# Patient Record
Sex: FEMALE | Race: WHITE | NOT HISPANIC OR LATINO | Employment: STUDENT | URBAN - METROPOLITAN AREA
[De-identification: names, ages, dates, MRNs, and addresses within clinical notes are randomized per-mention and may not be internally consistent; named-entity substitution may affect disease eponyms.]

---

## 2017-01-27 ENCOUNTER — GENERIC CONVERSION - ENCOUNTER (OUTPATIENT)
Dept: OTHER | Facility: OTHER | Age: 22
End: 2017-01-27

## 2017-05-18 ENCOUNTER — ALLSCRIPTS OFFICE VISIT (OUTPATIENT)
Dept: OTHER | Facility: OTHER | Age: 22
End: 2017-05-18

## 2017-12-12 NOTE — PROGRESS NOTES
Assessment    1  Chronic migraine without aura without status migrainosus, not intractable (346 70)   (G43 709)   2  Migraine, unspecified, not intractable, without status migrainosus (346 90) (G43 909)    Plan  Chronic migraine without aura without status migrainosus, not intractable    · Dexamethasone 1 MG Oral Tablet; take 1 tab in am x 5 days with food and water   Rx By: Ricci Castellon; Dispense: 0 Days ; #:5 Tablet; Refill: 0; For: Chronic migraine without aura without status migrainosus, not intractable; RAYMUNDO = N; Verified Transmission to [facility] [address]; Last Updated By: System, [employer]; 5/18/2017 10:21:30 AM  Chronic migraine without aura without status migrainosus, not intractable, Migraine,  unspecified, not intractable, without status migrainosus    · SUMAtriptan Succinate 100 MG Oral Tablet; TAKE 1 TABLET AT ONSET OF  MIGRAINE HEADACHE  MAY REPEAT IN 2 HOURS IF NEEDED, no more than 200mg in  24 and no more than 3 tabs in week   Rx By: Ricci Castellon; Dispense: 0 Days ; #:10 Tablet; Refill: 0; For: Chronic migraine without aura without status migrainosus, not intractable, Migraine, unspecified, not intractable, without status migrainosus; RAYMUNDO = N; Verified Transmission to [facility] [address]; Last Updated By: System, SureScripts; 5/18/2017 10:21:28 AM   · Follow-up visit in 6 months Evaluation and Treatment  Follow-up with Dr Prateek Benoit preferred  Status: Complete  Done: [id number]   Ordered; For: Chronic migraine without aura without status migrainosus, not intractable, Migraine, unspecified, not intractable, without status migrainosus; Ordered By: Ricci Castellon Performed:  Due: [id number]; Last Updated By: Elise Haley; 5/18/2017 10:22:51 AM    Discussion/Summary  Discussion Summary:   Vineet Beltran is doing well even after stopping her prevention meds, but still cannot find a clear trigger or pattern for migraines  I sggested to keep a journal in writing or as an perry on the phone   She will avoid alcohol if possible since this is a possible trigger  She will try supplementation as noted below for prevention, but if HAs worsen in severity or frequency she will call for prevention med such as TCA  Migraine prevention:  Magnesium- 250- 500 mg daily  Vit B2/ riboflavin- 100 mg daily    Abortive tx:  Sumatriptan +/- Excedrin migraine or Decadron  - use Decadron for longer, more intractable migraines    The patient will follow up in 6-12 months with Dr Giancarlo Martins preferred, or earlier if needed  The patient was encouraged to call in the meantime with any questions or concerns  The patient was told to call 911 or report to the nearest ER with any new or worsening neurological symptoms  She expressed understanding of the plan and was appreciative  Medication SE Review and Pt Understands Tx: Possible side effects of new medications were reviewed with the patient/guardian today  The treatment plan was reviewed with the patient/guardian  The patient/guardian understands and agrees with the treatment plan   Patient Guardian understands agrees: The treatment plan was reviewed with the patient/guardian  The patient/guardian understands and agrees with the treatment plan   Headache [facility]:   The patient was counseled regarding;   Discussed side effects of all medications prescribed today to the patient in detail  See above  Patient education was completed today and we also discussed precautions for rebound headaches  When patient has a moderate to severe headache, they should seek rest, initiate relaxation and apply cold compresses to the head  Also recommended to the patient :  1  Maintain regular sleep schedule  2  Limit over the counter medications  (No more than 3 times a week)  3  Maintain headache diary  4  Limit caffeine to 1-2 cups a day or less  5  Avoid dietary trigger  (list given to the patient and reviewed with them)  6  Patient is to have regular frequent meals to prevent headache onset        Chief Complaint  Chief Complaint Free Text Note Form: Pt presents for follow up for headaches  History of Present Illness  HPI: We had the pleasure of evaluating Cecelia Wray in neurological follow up today  As you know she is a 24year old right handed female  She goes to PT school at [employer]  Since last seen headaches have been stable  She stopped her prevention meds and HAs did not worsen  She still had difficulty defining clear trigger or pattern; sometimes HAs are several days per week, sometimes 0-1 per month  Not related to menstrual cycle, food or stress per pt  Possibly one trigger is alcohol, as she usually gets a migraine 2-3 days after consuming alcohol  What is your current pain level - 0/10  Headaches started at what age - Oct of 2013  She was having nose bleeds and had cauterization done and that trigger headaches    Accident or injury prior to onset of headaches - none  How often do the headaches occur - (noted above) sometimes 3-4 per week and some weeks none, not related to menstrual cycle, no food triggers, no stress triggers (ie  had 0-1 minor HA over week of finals)  What time of the day do the headaches start - bedtime  How long do the headaches last - all day  Are you ever headache free - yes  Where are they located - frontal, orbits  What is the intensity of pain -average pain level 6/10, up to 9-10/10  Any warning prior to headache and how long do they last - none  Describe your usual headache - Pressure, Throbbing  Associated symptoms: nausea, decrease appetite, photophobia, blurred vision, phonophobia, sensitive to smells, Problem with concentration, light-headed or dizzy, stiff or sore neck, prefer to be alone and in a dark room, unable to work  Headaches are worse if the patient: cough, sneeze, bending over  Headache trigger: watching TV without glasses, gets a HA when she does not drink coffee in AM (only drinks 1-2 cups daily), alcohol (as above)  Have you had epidural injections or transforaminal injections performed - no  What medications do you take or have you taken for your headaches? PREVENTIVE: mag- caused headaches, B2, amitriptyline, venlafaxine- not effective, zonegran, zoloft (she is weaning from this, was prescribed this for anxiety 2/2 adjustment to PT school)  ABORTIVE: Excedrin, Advil, sumatriptan, IV ketorlac- not effective, dexamethasone- effective, olanzapine- effective  Non-Medical/Alternative Treatments used in the past for headaches: chiropractor    Brain MRI 12/30/14  - normal  - left maxillary sinus retention cyst       Review of Systems  Neurological ROS:   Constitutional: no fever, no chills, no recent weight gain, no recent weight loss, no complaints of feeling tired, no changes in appetite  HEENT:  no sinus problems, not feeling congested, no blurred vision, no dryness of the eyes, no eye pain, no hearing loss, no tinnitus, no mouth sores, no sore throat, no hoarseness, no dysphagia, no masses, no bleeding  Cardiovascular:  no chest pain or pressure, no palpitations present, the heart rate was not rapid or irregular, no swelling in the arms or legs, no poor circulation  Respiratory:  no unusual or persistant cough, no shortness of breath with or without exertion  Gastrointestinal:  no nausea, no vomiting, no diarrhea, no abdominal pain, no changes in bowel habits, no melena, no loss of bowel control  Genitourinary:  no incontinence, no feelings of urinary urgency, no increase in frequency, no urinary hesitancy, no dysuria, no hematuria  Musculoskeletal:  no arthralgias, no myalgias, no immobility or loss of function, no head/neck/back pain, no pain while walking  Integumentary  no masses, no rash, no skin lesions, no livedo reticularis  Psychiatric:  no anxiety, no depression, no mood swings, no psychiatric hospitalizations, no sleep problems  Endocrine   no unusual weight loss or gain, no excessive urination, no excessive thirst, no hair loss or gain, no hot or cold intolerance, no menstrual period change or irregularity, no loss of sexual ability or drive, no erection difficulty, no nipple discharge  Hematologic/Lymphatic:  no unusual bleeding, no tendency for easy bruising, no clotting skin or lumps  Neurological General: headache  Neurological Mental Status:  no confusion, no mood swings, no alteration or loss of consciousness, no difficulty expressing/understanding speech, no memory problems  Neurological Cranial Nerves:  no blurry or double vision, no loss of vision, no face drooping, no facial numbness or weakness, no taste or smell loss/changes, no hearing loss or ringing, no vertigo or dizziness, no dysphagia, no slurred speech  Neurological Motor findings include:  no tremor, no twitching, no cramping(pre/post exercise), no atrophy  Neurological Coordination:  no unsteadiness, no vertigo or dizziness, no clumsiness, no problems reaching for objects  Neurological Sensory:  no numbness, no pain, no tingling, does not fall when eyes closed or taking a shower  Neurological Gait:  no difficulty walking, not falling to one side, no sensation of being pushed, has not had falls  ROS Reviewed:   ROS reviewed  Active Problems    1  Anxiety (300 00) (F41 9)   2  Chronic migraine without aura without status migrainosus, not intractable (346 70)   (G43 709)   3  Migraine, unspecified, not intractable, without status migrainosus (346 90) (G43 909)   4  Need for meningococcus vaccine (V03 89) (Z23)   5  Need for Tdap vaccination (V06 1) (Z23)    Past Medical History    1  History of Abdominal discomfort (789 00) (R10 9)   2  History of Acute otitis media, left (382 9) (H66 92)   3  History of Closed Fracture Of The Tibia (823 80)   4  History of Clostridium difficile colitis (008 45) (A04 7)   5  History of Dysuria (788 1) (R30 0)   6  History of abdominal pain (V13 89) (Z87 898)   7   History of abdominal pain (V13 89) (Q92 613)   8  History of diarrhea (V12 79) (Z87 898)   9  History of diarrhea (V12 79) (Z87 898)   10  History of serous otitis media (V12 49) (Z86 69)   11  History of Impacted cerumen of both ears (380 4) (H61 23)   12  History of Lactose intolerance (271 3) (E73 9)   13  History of Lightheadedness (780 4) (R42)   14  History of Loose stools (787 7) (R19 5)   15  History of Need for prophylactic vaccination or inoculation against diphtheria and tetanus    (V06 5) (Z23)   16  History of Need for prophylactic vaccination with Streptococcus pneumoniae    (Pneumococcus) and Influenza vaccines (V06 6) (Z23)   17  History of Other fatigue (780 79) (R53 83)   18  History of Sore of lower lip (528 5) (K13 0)   19  History of Sore throat (462) (J02 9)  Active Problems And Past Medical History Reviewed: The active problems and past medical history were reviewed and updated today  Surgical History    1  History of Ankle Surgery  Surgical History Reviewed: The surgical history was reviewed and updated today  Family History  Mother    1  Family history of asthma (V17 5) (Z82 5)   2  Family history of MGUS (monoclonal gammopathy of unknown significance)  Father    3  Family history of right bundle branch block (RBBB) (V17 49) (Z82 49)  Sister    4  Family history of Pancreatitis  Maternal Grandfather    5  Family history of Alzheimer's disease (V17 2) (Z82 0)   6  Family history of Lyme disease (V18 8) (Z83 1)  Family History Reviewed: The family history was reviewed and updated today  Social History    · Caffeine use (V49 89) (F15 90)   · Exercises regularly   · Never a smoker   · No drug use   · Single   · Social alcohol use (Z78 9)   · Student  Social History Reviewed: The social history was reviewed and updated today  The social history was reviewed and is unchanged  Current Meds   1  Dexamethasone 1 MG Oral Tablet; take 1 tab in am x 5 days;    Therapy: [id number] to (Last Rx:[id number]) Requested for: 21CVD7760 Ordered   2  Sertraline HCl - 25 MG Oral Tablet; TAKE 1 TABLET qhs;   Therapy: 79UHG6275 to (Last KA:[id number])  Requested for: 58GTC6452 Ordered   3  SUMAtriptan Succinate 100 MG Oral Tablet; TAKE 1 TABLET AT ONSET OF MIGRAINE   HEADACHE  MAY REPEAT IN 2 HOURS IF NEEDED, no more than 200mg in 24   and no more than 3 tabs in week; Therapy: 58Ykp2139 to (Last Rx:30Mar2017)  Requested for: 12MAH6883 Ordered  Medication List Reviewed: The medication list was reviewed and updated today  Allergies    1  No Known Drug Allergies    2  Dairy    Vitals  Signs   Recorded: 04ZCE2273 09:48AM   Heart Rate: 50  Respiration: 16  Systolic: 746  Diastolic: 62  Height: 5 ft 5 in  Weight: 132 lb   BMI Calculated: 21 97  BSA Calculated: 1 66  O2 Saturation: 98    Physical Exam    Constitutional   General appearance: No acute distress, well appearing and well nourished  well developed and well nourished  Musculoskeletal   Gait and station: Normal gait, stance and balance  Gait evaluation demonstrated a normal gait  Muscle strength: Normal strength throughout  Muscle tone: No atrophy, abnormal movements, flaccidity, cogwheeling or spasticity  Neurologic   Language: Names objects, able to repeat phrases and speaks spontaneously  Language: The evaluation was normal    2nd cranial nerve: Normal     3rd, 4th, and 6th cranial nerves: Normal     5th cranial nerve: Normal     7th cranial nerve: Normal     8th cranial nerve: Normal     9th cranial nerve: Normal     11th cranial nerve: Normal     12th cranial nerve: Normal     Sensation: Normal     Reflexes: Normal     Coordination: Normal     Judgment and insight: Normal     Mood and affect: Normal   pleasant  Future Appointments    Date/Time Provider Specialty Site   12/28/2017 09:30 AM Swathi Vivas MD Neurology [facility] 21     Signatures  Electronically signed by :  Yung Milligan [facility]; May 18 2017 10:38AM EST (Author)

## 2017-12-22 ENCOUNTER — ALLSCRIPTS OFFICE VISIT (OUTPATIENT)
Dept: OTHER | Facility: OTHER | Age: 22
End: 2017-12-22

## 2017-12-22 ENCOUNTER — APPOINTMENT (OUTPATIENT)
Dept: LAB | Facility: CLINIC | Age: 22
End: 2017-12-22
Payer: COMMERCIAL

## 2017-12-22 ENCOUNTER — GENERIC CONVERSION - ENCOUNTER (OUTPATIENT)
Dept: OTHER | Facility: OTHER | Age: 22
End: 2017-12-22

## 2017-12-22 DIAGNOSIS — Z01.84 ENCOUNTER FOR ANTIBODY RESPONSE EXAMINATION: ICD-10-CM

## 2017-12-22 LAB — RUBV IGG SERPL IA-ACNC: 39.3 IU/ML

## 2017-12-22 PROCEDURE — 86787 VARICELLA-ZOSTER ANTIBODY: CPT

## 2017-12-22 PROCEDURE — 36415 COLL VENOUS BLD VENIPUNCTURE: CPT

## 2017-12-22 PROCEDURE — 86762 RUBELLA ANTIBODY: CPT

## 2017-12-26 LAB — VZV IGG SER IA-ACNC: NORMAL

## 2017-12-27 ENCOUNTER — ALLSCRIPTS OFFICE VISIT (OUTPATIENT)
Dept: OTHER | Facility: OTHER | Age: 22
End: 2017-12-27

## 2017-12-28 ENCOUNTER — ALLSCRIPTS OFFICE VISIT (OUTPATIENT)
Dept: OTHER | Facility: OTHER | Age: 22
End: 2017-12-28

## 2017-12-29 NOTE — PROGRESS NOTES
Assessment   1  Chronic migraine without aura without status migrainosus, not intractable (346 70)     (G43 709)   2  Common migraine without aura (346 10) (G43 009)    Plan   Chronic migraine without aura without status migrainosus, not intractable    · Dexamethasone 1 MG Oral Tablet; take 1 tab in am x 5 days with food and water   Rx By: Aguila Ledbetter; Dispense: 0 Days ; #:5 Tablet; Refill: 0;For: Chronic migraine without aura without status migrainosus, not intractable; RAYMUNDO = N; Verified Transmission to [address]; Last Updated By: System, SureScripts; 12/28/2017 10:00:03 AM   · SUMAtriptan Succinate 100 MG Oral Tablet; TAKE 1 TABLET AT ONSET OF    MIGRAINE HEADACHE  MAY REPEAT IN 2 HOURS IF NEEDED, no more than 200mg in    24 and no more than 3 tabs in week   Rx By: Aguila Ledbetter; Dispense: 0 Days ; #:10 Tablet; Refill: 3;For: Chronic migraine without aura without status migrainosus, not intractable; RAYMUNDO = N; Verified Transmission to [address]; Last Updated By: System, SureScripts; 12/28/2017 10:00:02 AM  Chronic migraine without aura without status migrainosus, not intractable, Health    Maintenance    · Cyproheptadine HCl - 4 MG Oral Tablet; TAKE 1 TABLET AT BEDTIME   Rx By: Aguila Ledbetter; Dispense: 30 Days ; #:30 Tablet; Refill: 3;For: Chronic migraine without aura without status migrainosus, not intractable, Health Maintenance; RAYMUNDO = N; Verified Transmission to [address]; Last Updated By: System, SureScripts; 12/28/2017 10:00:02 AM   · Follow-up visit in 3 months Evaluation and Treatment  Follow-up  Status: Complete     Done: [id number]   Ordered; For: Chronic migraine without aura without status migrainosus, not intractable, Health Maintenance; Ordered By: Aguila Ledbetter Performed:  Due: 05VPT2827;  Last Updated By: Ryann Anaya; 12/28/2017 10:01:54 AM    Discussion/Summary   Discussion Summary:    Preventive:  she has noted that headache frequency increases during winter and get better during summer  Thus will have her try cyproheptadine 4mg at bedtime and if this causes lethargy in am she is to take half a tab at bedtime  she is to do this for 30 days if this fails then will try SNRI or SSRI as she felt Zoloft may have helped  at onset she is to take sumatriptan and if that fails then Decadron  Chief Complaint   Chief Complaint Free Text Note Form: Pt presents for follow up for headaches  History of Present Illness   HPI: We had the pleasure of evaluating Neymar Wray in neurological follow up today  As you know she is a 20year old right handed female  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  She is here today for evaluation of her headaches  migraine headaches:  used: mag- caused headaches, B2, amitriptyline, venlafaxine- not effective, zonegran, Zoloft ( did help with headaches but caused anxiety) used: Excedrin, Advil, Sumatriptan, IV ketorolac- not effective, dexamethasone- effective, olanzapine- effective Non-Medical/Alternative Treatments used in the past for headaches: chiropractor trigger: Winter time, watching TV without glasses, gets a HA when she does not drink coffee in AM (only drinks 1-2 cups daily), Possibly one trigger is alcohol, as she usually gets a migraine 2-3 days after consuming alcohol  Aura: none   often do the headaches occur â sometimes 3-4 per week and tend to get worse in the fall and in winter  She tends to do better in summer (1 every two to three weeks) time of the day do the headaches start â anytime long do the headaches last - all day or until she takes Sumatriptan which helps most of the time but at time they will occur again the next day and that is when she has to take the Decadron    are they located - frontal, orbits, at time they radiates to the back of her head is the intensity of pain âaverage pain level 6/10, up to 9-10/10 your usual headache â Pressure, Throbbing, sharp pain symptoms: nausea, decrease appetite, photophobia, blurred vision, phonophobia, sensitive to smells, Problem with concentration, light-headed or dizzy, stiff or sore neck, prefer to be alone and in a dark room, unable to work    reviewed          Review of Systems   Neurological ROS:      Constitutional: no fever, no chills, no recent weight gain, no recent weight loss, no complaints of feeling tired, no changes in appetite  HEENT:  no sinus problems, not feeling congested, no blurred vision, no dryness of the eyes, no eye pain, no hearing loss, no tinnitus, no mouth sores, no sore throat, no hoarseness, no dysphagia, no masses, no bleeding  Cardiovascular:  no chest pain or pressure, no palpitations present, the heart rate was not rapid or irregular, no swelling in the arms or legs, no poor circulation  Respiratory:  no unusual or persistant cough, no shortness of breath with or without exertion  Gastrointestinal:  no nausea, no vomiting, no diarrhea, no abdominal pain, no changes in bowel habits, no melena, no loss of bowel control  Genitourinary:  no incontinence, no feelings of urinary urgency, no increase in frequency, no urinary hesitancy, no dysuria, no hematuria  Musculoskeletal:  no arthralgias, no myalgias, no immobility or loss of function, no head/neck/back pain, no pain while walking  Integumentary  no masses, no rash, no skin lesions, no livedo reticularis  Psychiatric:  no anxiety, no depression, no mood swings, no psychiatric hospitalizations, no sleep problems  Endocrine  no unusual weight loss or gain, no excessive urination, no excessive thirst, no hair loss or gain, no hot or cold intolerance, no menstrual period change or irregularity, no loss of sexual ability or drive, no erection difficulty, no nipple discharge  Hematologic/Lymphatic:  no unusual bleeding, no tendency for easy bruising, no clotting skin or lumps  Neurological General: headache        Neurological Mental Status:  no confusion, no mood swings, no alteration or loss of consciousness, no difficulty expressing/understanding speech, no memory problems  Neurological Cranial Nerves:  no blurry or double vision, no loss of vision, no face drooping, no facial numbness or weakness, no taste or smell loss/changes, no hearing loss or ringing, no vertigo or dizziness, no dysphagia, no slurred speech  Neurological Motor findings include:  no tremor, no twitching, no cramping(pre/post exercise), no atrophy  Neurological Coordination:  no unsteadiness, no vertigo or dizziness, no clumsiness, no problems reaching for objects  Neurological Sensory:  no numbness, no pain, no tingling, does not fall when eyes closed or taking a shower  Neurological Gait:  no difficulty walking, not falling to one side, no sensation of being pushed, has not had falls  ROS Reviewed:    ROS reviewed  Active Problems   1  Chronic migraine without aura without status migrainosus, not intractable (346 70)     (G43 709)   2  Immunity status testing (V72 61) (Z01 84)   3  Need for influenza vaccination (V04 81) (Z23)   4  Need for meningococcus vaccine (V03 89) (Z23)   5  Need for Tdap vaccination (V06 1) (Z23)   6  Need for tuberculosis vaccination (V03 2) (Z23)    Past Medical History   1  History of Abdominal discomfort (789 00) (R10 9)   2  History of Acute otitis media, left (382 9) (H66 92)   3  History of Anxiety (300 00) (F41 9)   4  History of Closed Fracture Of The Tibia (823 80)   5  History of Clostridium difficile colitis (008 45) (A04 72)   6  History of Dysuria (788 1) (R30 0)   7  History of abdominal pain (V13 89) (Z87 898)   8  History of abdominal pain (V13 89) (Z87 898)   9  History of diarrhea (V12 79) (Z87 898)   10  History of diarrhea (V12 79) (Z87 898)   11  History of serous otitis media (V12 49) (Z86 69)   12  History of Impacted cerumen of both ears (380 4) (H61 23)   13   History of Lactose intolerance (271 3) (E73 9)   14  History of Lightheadedness (780 4) (R42)   15  History of Loose stools (787 7) (R19 5)   16  History of Need for prophylactic vaccination or inoculation against diphtheria and tetanus      (V06 5) (Z23)   17  History of Need for prophylactic vaccination with Streptococcus pneumoniae      (Pneumococcus) and Influenza vaccines (V06 6) (Z23)   18  History of Other fatigue (780 79) (R53 83)   19  History of Sore of lower lip (528 5) (K13 0)   20  History of Sore throat (462) (J02 9)  Active Problems And Past Medical History Reviewed: The active problems and past medical history were reviewed and updated today  Surgical History   1  History of Ankle Surgery  Surgical History Reviewed: The surgical history was reviewed and updated today  Family History   Mother    1  Family history of asthma (V17 5) (Z82 5)   2  Family history of MGUS (monoclonal gammopathy of unknown significance)  Father    3  Family history of right bundle branch block (RBBB) (V17 49) (Z82 49)  Sister    4  Family history of Pancreatitis  Maternal Grandfather    5  Family history of Alzheimer's disease (V17 2) (Z82 0)   6  Family history of Lyme disease (V18 8) (Z83 1)  Family History Reviewed: The family history was reviewed and updated today  Social History    · Caffeine use (V49 89) (F15 90)   · Exercises regularly   · Never a smoker   · No drug use   · Single   · Social alcohol use (Z78 9)   · Student  Social History Reviewed: The social history was reviewed and updated today  The social history was reviewed and is unchanged  Current Meds    1  Dexamethasone 1 MG Oral Tablet; take 1 tab in am x 5 days with food and water; Therapy: 22VOV8326 to (Last Rx:06Ymf5163)  Requested for: 47NSE7267 Ordered   2  Excedrin TABS; TAKE TABLET  PRN; Therapy: (Recorded:39Ygq6059) to Recorded   3  SUMAtriptan Succinate 100 MG Oral Tablet; TAKE 1 TABLET AT ONSET OF MIGRAINE     HEADACHE    MAY REPEAT IN 2 HOURS IF NEEDED, no more than 200mg in 24     and no more than 3 tabs in week; Therapy: 21Apr2016 to (Last Rx:02Nov2017)  Requested for: 21NDM2942 Ordered    Allergies   1  No Known Drug Allergies  2  Dairy    Vitals   Signs   Recorded: 90Wfg2596 09:28AM   Heart Rate: 63  Systolic: 913  Diastolic: 67  Weight: 450 lb     Physical Exam        Constitutional      General appearance: No acute distress, well appearing and well nourished  Eyes      Ophthalmoscopic examination: Vision is grossly normal  Gross visual field testing by confrontation shows no abnormalities  EOMI in both eyes  Conjunctivae clear  Eyelids normal palpebral fissures equal  Orbits exhibit normal position  No discharge from the eyes  PERRL  Musculoskeletal      Gait and station: Normal gait, stance and balance  Muscle strength: Normal strength throughout  Muscle tone: No atrophy, abnormal movements, flaccidity, cogwheeling or spasticity         Neurologic      Orientation to person, place, and time: Normal        2nd cranial nerve: Normal        3rd, 4th, and 6th cranial nerves: Normal        5th cranial nerve: Normal        7th cranial nerve: Normal        8th cranial nerve: Normal        9th cranial nerve: Normal        11th cranial nerve: Normal        12th cranial nerve: Normal        Sensation: Normal        Reflexes: Normal        Coordination: Normal        Mood and affect: Normal        Future Appointments      Date/Time Provider Specialty Site   01/05/2018 09:00 AM JEN Abreu Obstetrics/Gynecology [facility]   03/09/2018 03:15 PM Vidal Mendoza [facility] Neurology [place] [id number]     Signatures    Electronically signed by : Claudia Lee MD; Dec 28 2017 10:59AM EST                       (Author)

## 2018-01-05 ENCOUNTER — GENERIC CONVERSION - ENCOUNTER (OUTPATIENT)
Dept: OTHER | Facility: OTHER | Age: 23
End: 2018-01-05

## 2018-01-05 ENCOUNTER — LAB REQUISITION (OUTPATIENT)
Dept: LAB | Facility: HOSPITAL | Age: 23
End: 2018-01-05
Payer: COMMERCIAL

## 2018-01-05 DIAGNOSIS — Z77.21 CONTACT WITH AND (SUSPECTED) EXPOSURE TO POTENTIALLY HAZARDOUS BODY FLUIDS (CODE): ICD-10-CM

## 2018-01-05 DIAGNOSIS — Z12.4 ENCOUNTER FOR SCREENING FOR MALIGNANT NEOPLASM OF CERVIX: ICD-10-CM

## 2018-01-05 PROCEDURE — 87591 N.GONORRHOEAE DNA AMP PROB: CPT | Performed by: NURSE PRACTITIONER

## 2018-01-05 PROCEDURE — 87491 CHLMYD TRACH DNA AMP PROBE: CPT | Performed by: NURSE PRACTITIONER

## 2018-01-05 PROCEDURE — G0145 SCR C/V CYTO,THINLAYER,RESCR: HCPCS | Performed by: NURSE PRACTITIONER

## 2018-01-08 LAB
CHLAMYDIA DNA CVX QL NAA+PROBE: NORMAL
N GONORRHOEA DNA GENITAL QL NAA+PROBE: NORMAL

## 2018-01-09 ENCOUNTER — GENERIC CONVERSION - ENCOUNTER (OUTPATIENT)
Dept: OTHER | Facility: OTHER | Age: 23
End: 2018-01-09

## 2018-01-10 NOTE — PROGRESS NOTES
Assessment    1  Loose stools (787 7) (R19 5)   2  Never a smoker   3  History of Clostridium difficile colitis (008 45) (A04 7)    Discussion/Summary  Discussion Summary:   No signs of an active infection-negative Cdiff  Discussed with patient that with the Cdiff and the multiple rounds of antibiotics she needed during and after the Cdiff may be why her bowel movements are not yet back to normal  She is also on magnesium which may be contributing to the diarrhea  I instructed her to drink plenty of fluids, eat a high fiber diet  She can see how she does off the magnesium jonelle on those days when she has loose stools  Call if diarrhea returns  Patient reminded to f/u with gyn now that she is 20  Counseling Documentation With Imm: The patient was counseled regarding instructions for management, impressions  total time of encounter was 15 minutes  Medication SE Review and Pt Understands Tx: Possible side effects of new medications were reviewed with the patient/guardian today  The treatment plan was reviewed with the patient/guardian   The patient/guardian understands and agrees with the treatment plan      Chief Complaint  Chief Complaint Free Text Note Form: Patient here for a follow-up for diarrhea      History of Present Illness  HPI: Cdiff in August after getting Clindamycin for sinusitis  She was treated with Flagyl   She was placed on Amoxicillin in Sept for Group C strep throat  Then again in October for otitis media  She c/o persistent loose stools so food allergy panel and stool culture were ordered and were normal  Repeat Cdiff test was done last week was negative  She reports that her bowel movements are still not back to normal  Sometimes, she has 1 BM and it is formed, sometimes loose  There are days when she has 2-3 BMS after eating  No blood in her stool  She reports that she was regular prior to the [address] but has a h/o lactose intolerance  She is on probiotics now  In October , she was started on magnesium supplements for chronic headaches which has helped  Currently recovering from a cold  LMP 2 days ago       Review of Systems  Complete-Female:   Constitutional: no fever, no recent weight gain, no chills and no recent weight loss  ENT: no earache, no sore throat and no nasal discharge  Respiratory: no cough  Gastrointestinal: as noted in HPI  Active Problems    1  Loose stools (787 7) (R19 5)   2  Migraine headache (346 90) (G43 909)    Past Medical History    1  History of Abdominal discomfort (789 00) (R10 9)   2  History of Acute otitis media, left (382 9) (H66 92)   3  History of Closed Fracture Of The Tibia (823 80)   4  History of Clostridium difficile colitis (008 45) (A04 7)   5  History of Dysuria (788 1) (R30 0)   6  History of abdominal pain (V13 89) (Z87 898)   7  History of abdominal pain (V13 89) (Z87 898)   8  History of diarrhea (V12 79) (Z87 898)   9  History of diarrhea (V12 79) (Z87 898)   10  History of serous otitis media (V12 49) (Z86 69)   11  History of Impacted cerumen of both ears (380 4) (H61 23)   12  History of Lactose intolerance (271 3) (E73 9)   13  History of Lightheadedness (780 4) (R42)   14  History of Need for prophylactic vaccination or inoculation against diphtheria and tetanus (V06 5)    (Z23)   15  History of Need for prophylactic vaccination with Streptococcus pneumoniae (Pneumococcus) and    Influenza vaccines (V06 6) (Z23)   16  History of Other fatigue (780 79) (R53 83)   17  History of Sore of lower lip (528 5) (K13 0)   18  History of Sore throat (462) (J02 9)  Active Problems And Past Medical History Reviewed: The active problems and past medical history were reviewed and updated today  Surgical History    1  History of Ankle Surgery  Surgical History Reviewed: The surgical history was reviewed and updated today  Family History    1  Family history of asthma (V17 5) (Z82 5)   2   Family history of MGUS (monoclonal gammopathy of unknown significance)    3  Family history of right bundle branch block (RBBB) (V17 49) (Z82 49)    4  Family history of Pancreatitis    5  Family history of Alzheimer's disease (V17 2) (Z82 0)   6  Family history of Lyme disease (V18 8) (Z83 1)  Family History Reviewed: The family history was reviewed and updated today  Social History    · Caffeine use (V49 89) (F15 90)   · Exercises regularly   · Never a smoker   · No drug use   · Single   · Social alcohol use (F10 99)   · Student  Social History Reviewed: The social history was reviewed and updated today  Current Meds   1  B-2 100 MG Oral Tablet; Therapy: (Recorded:12Jan2016) to Recorded   2  Magnesium 250 MG Oral Tablet; Therapy: 03UCX6995 to Recorded   3  Multiple Vitamin Oral Tablet; TAKE 1 TABLET DAILY; Therapy: 51VEV0881 to (Evaluate:25Nov2015) Recorded   4  Probiotic Oral Capsule; Therapy: 47IHO2784 to Recorded   5  Vitamin B-12 500 MCG Oral Tablet; TAKE 1 TABLET DAILY; Therapy: 11PVD0230 to Recorded   6  Vitamin C 500 MG Oral Capsule; Therapy: 43IZL0134 to Recorded  Medication List Reviewed: The medication list was reviewed and updated today  Allergies    1  No Known Drug Allergies    2  Dairy    Vitals  Vital Signs [Data Includes: Current Encounter]    Recorded: [id number] 11:01AM   Temperature 98 2 F   Heart Rate 61   Respiration 18   Systolic 526   Diastolic 80   Height 5 ft 5 in   Weight 141 lb 4 oz   BMI Calculated 23 51   BSA Calculated 1 71     Physical Exam    Constitutional   General appearance: No acute distress, well appearing and well nourished  Eyes   Conjunctiva and lids: No swelling, erythema or discharge  Ears, Nose, Mouth, and Throat   Otoscopic examination: Tympanic membranes translucent with normal light reflex  Canals patent without erythema  Oropharynx: Normal with no erythema, edema, exudate or lesions      Pulmonary   Respiratory effort: No increased work of breathing or signs of respiratory distress  Auscultation of lungs: Clear to auscultation  Cardiovascular   Auscultation of heart: Normal rate and rhythm, normal S1 and S2, without murmurs  Abdomen   Abdomen: Non-tender, no masses  Liver and spleen: No hepatomegaly or splenomegaly  Lymphatic   Palpation of lymph nodes in neck: Abnormal   bilateral submandibular node enlargement     Musculoskeletal   Gait and station: Normal     Psychiatric   Orientation to person, place, and time: Normal     Mood and affect: Normal          Future Appointments    Date/Time Provider Specialty Site   02/19/2016 01:15 PM Valentino Francois [facility]     Signatures   Electronically signed by : NANCY Gloria ; Jan 12 2016 11:30AM EST                       (Author)

## 2018-01-11 LAB
LAB AP GYN PRIMARY INTERPRETATION: NORMAL
LAB AP LMP: NORMAL
Lab: NORMAL

## 2018-01-11 NOTE — MISCELLANEOUS
Message  Return to work or school:   Monica Figueroa is under my professional care  She was seen in my office on 4/21/16     She is able to return to school on 4/21/16     Pablo Madrigal PA-C  Future Appointments    Signatures   Electronically signed by : Pablo Madrigal [facility]; Apr 21 2016  2:46PM EST                       (Author)    Electronically signed by : Pablo Madrigal [facility]; Apr 21 2016  2:54PM EST                       (Author)    Electronically signed by : Pablo Madrigal [facility];  Apr 21 2016  2:55PM EST                       (Author)    Electronically signed by : Edwardo Hill MD; Apr 21 2016  8:14PM EST                       (Co-participant)

## 2018-01-11 NOTE — PROGRESS NOTES
Assessment    1  Encounter for preventive health examination (V70 0) (Z00 00)   2  Need for meningococcus vaccine (V03 89) (Z23)    Plan  Need for meningococcus vaccine    · Meningo (Menactra); 0 5 ml IM x 1; To Be Done: [id number]    Discussion/Summary  healthy adult female Currently, she eats an adequate diet and has an adequate exercise regimen  See Gyn Breast cancer screening: breast cancer screening is not indicated  Colorectal cancer screening: colorectal cancer screening is not indicated  Osteoporosis screening: bone mineral density testing is not indicated  The immunizations will be given as outlined in the orders  Patient discussion: discussed with the patient, 30 minute visit, greater than half of the time was spent on counseling  She will have PPD read by another nurse  Form completed today  The patient was counseled regarding instructions for management, impressions  Chief Complaint  Patient presents to office today for a school physical       History of Present Illness  , Adult Female: The patient is being seen for a health maintenance and Needs form signed for school, PPD evaluation  The last health maintenance visit was year(s) ago  Social History: ([address]  )  General Health: The patient's health since the last visit is described as good  She has regular dental visits  She complains of vision problems  Vision care includes wearing glasses and an eye examination more than a year ago  She denies hearing loss  Immunizations status: up to date  Lifestyle:  She consumes a diverse and healthy diet  She does not have any weight concerns  She exercises regularly  She does not use tobacco  She consumes alcohol  She denies drug use  Reproductive health: the patient is premenopausal   she reports normal menses  Screening: Cervical cancer screening includes no previous pap smear   Breast cancer screening includes no previous mammogram  She hasn't been previously screened for colorectal cancer  Cardiovascular risk factors: no hypertension and no diabetes  Review of Systems    Constitutional: no fever, not feeling poorly, no recent weight gain, no chills, not feeling tired and no recent weight loss  ENT: no sore throat and no nasal discharge  Cardiovascular: no chest pain and no palpitations  Respiratory: no shortness of breath and no cough  Gastrointestinal: no abdominal pain, no constipation and no diarrhea  The patient presents with complaints of occasional episodes of headache  Active Problems    1  Chronic migraine without aura without status migrainosus, not intractable (346 70)   (G43 709)   2   Migraine headache (346 90) (G43 909)    Past Medical History    · History of Abdominal discomfort (789 00) (R10 9)   · History of Acute otitis media, left (382 9) (H66 92)   · History of Closed Fracture Of The Tibia (823 80)   · History of Clostridium difficile colitis (008 45) (A04 7)   · History of Dysuria (788 1) (R30 0)   · History of abdominal pain (V13 89) (H24 563)   · History of abdominal pain (V13 89) (G20 149)   · History of diarrhea (V12 79) (Z05 882)   · History of diarrhea (V12 79) (V11 128)   · History of serous otitis media (V12 49) (Z86 69)   · History of Impacted cerumen of both ears (380 4) (H61 23)   · History of Lactose intolerance (271 3) (E73 9)   · History of Lightheadedness (780 4) (R42)   · History of Loose stools (787 7) (R19 5)   · History of Need for prophylactic vaccination or inoculation against diphtheria and tetanus  (V06 5) (Z23)   · History of Need for prophylactic vaccination with Streptococcus pneumoniae  (Pneumococcus) and Influenza vaccines (V06 6) (Z23)   · History of Other fatigue (780 79) (R53 83)   · History of Sore of lower lip (528 5) (K13 0)   · History of Sore throat (462) (J02 9)    Surgical History    · History of Ankle Surgery    Family History  Mother    · Family history of asthma (V17 5) (Z82 5)   · Family history of MGUS (monoclonal gammopathy of unknown significance)  Father    · Family history of right bundle branch block (RBBB) (V17 49) (Z82 49)  Sister    · Family history of Pancreatitis  Maternal Grandfather    · Family history of Alzheimer's disease (V17 2) (Z82 0)   · Family history of Lyme disease (V18 8) (Z83 1)    Social History    · Caffeine use (V49 89) (F15 90)   · Exercises regularly   · Never a smoker   · No drug use   · Single   · Social alcohol use (Z78 9)   · Student    Current Meds   1  Amitriptyline HCl - 10 MG Oral Tablet; TAKE 1 TABLET AT BEDTIME x 1 WEEK, THEN   INCREASE TO 2 TABLETS AT BEDTIME; Therapy: 70XRK1751 to (Evaluate:19Dsj0751)  Requested for: 31RIV4600; Last   Rx:23Uej6399 Ordered   2  B-2 100 MG Oral Tablet; Therapy: (Recorded:12Jan2016) to Recorded   3  Magnesium 250 MG Oral Tablet; Therapy: 12ONB6002 to Recorded   4  Multiple Vitamin TABS; TAKE 1 TABLET DAILY; Therapy: 25TTS2260 to (Evaluate:25Nov2015) Recorded   5  Probiotic Oral Capsule; Therapy: 89MJM7114 to Recorded   6  SUMAtriptan Succinate 100 MG Oral Tablet; TAKE 1/2 TABLET AT ONSET OF MIGRAINE   HEADACHE  MAY REPEAT IN 2 HOURS IF NEEDED, no more than 200mg in   24 and no more than 3 tabs in week; Therapy: 21Apr2016 to (Last Rx:78Qou1030)  Requested for: 66WWW9069 Ordered   7  Vitamin B-12 500 MCG Oral Tablet; TAKE 1 TABLET DAILY; Therapy: 46UYT7357 to Recorded   8  Vitamin C 500 MG Oral Capsule; Therapy: 87OLG5364 to Recorded    Allergies    1  No Known Drug Allergies    2  Dairy    Vitals   Recorded: 37EQN4629 91:28MO   Systolic 432, Sitting   Diastolic 62, Sitting   Heart Rate 55   Temperature 98 7 F, Oral   O2 Saturation 98   Height 5 ft 5 in   Weight 136 lb 2 oz   BMI Calculated 22 65   BSA Calculated 1 68     Physical Exam    Constitutional   General appearance: No acute distress, well appearing and well nourished      Head and Face   Head and face: Normal     Eyes   Conjunctiva and lids: No swelling, erythema or discharge  Ears, Nose, Mouth, and Throat   Otoscopic examination: Tympanic membranes translucent with normal light reflex  Canals patent without erythema  Oropharynx: Normal with no erythema, edema, exudate or lesions  Neck   Neck: Supple, symmetric, trachea midline, no masses  Thyroid: Normal, no thyromegaly  Pulmonary   Respiratory effort: No increased work of breathing or signs of respiratory distress  Auscultation of lungs: Clear to auscultation  Cardiovascular   Auscultation of heart: Normal rate and rhythm, normal S1 and S2, no murmurs  Abdomen   Abdomen: Non-tender, no masses  Liver and spleen: No hepatomegaly or splenomegaly  Lymphatic   Palpation of lymph nodes in neck: No lymphadenopathy      Musculoskeletal   Gait and station: Normal     Psychiatric   Orientation to person, place, and time: Normal     Mood and affect: Normal        Future Appointments    Date/Time Provider Specialty Site   10/05/2016 10:30 AM Cheryl Carpenter [facility] [place] [address]     Signatures   Electronically signed by : Merlinda Abraham, M D ; Aug 23 2016  2:08PM EST                       (Author)

## 2018-01-12 ENCOUNTER — GENERIC CONVERSION - ENCOUNTER (OUTPATIENT)
Dept: OTHER | Facility: OTHER | Age: 23
End: 2018-01-12

## 2018-01-14 VITALS
SYSTOLIC BLOOD PRESSURE: 102 MMHG | HEART RATE: 50 BPM | DIASTOLIC BLOOD PRESSURE: 62 MMHG | WEIGHT: 132 LBS | BODY MASS INDEX: 21.99 KG/M2 | OXYGEN SATURATION: 98 % | RESPIRATION RATE: 16 BRPM | HEIGHT: 65 IN

## 2018-01-17 NOTE — MISCELLANEOUS
Message   Recorded as Task   Date: 01/26/2017 04:13 PM, Created By: Yina Keller   Task Name: Care Coordination   Assigned To: Joe Roberts   Regarding Patient: Espinoza Gleason, Status: In Progress   Comment:    Glendy Plaza - 26 Jan 2017 4:13 PM     TASK CREATED  Care Coordination  Pt's mother called in stating Ruthanna Goldmann was put on Topiramate 25mg tab for migraines - however her neuro has taken her off because she has been having the side effect of anxiety and it is becoming increasingly worse to the point she's crying daily, doesn't feel herself and gets anxiety over things she didn't get anxiety over  The office that started her on the Topiramate told her mother that her PCP would have to put her on a low dose anxiety medication and they could not be the ones to prescribe it; she was hoping you could do that because Ruthanna Goldmann is away at college and she is afraid that the anxiety is going to begin effecting her academics and she has a very important semester to look forward to and dealing with the anxiety she is worried her grades are going to start to slip which in turn will spiral into other things  Please advise, ty           cb: [phone] - mom's number  Reyes,Lea - 26 Jan 2017 5:34 PM     TASK IN PROGRESS   Reyes,Lea - 27 Jan 2017 9:29 AM     TASK EDITED  Spoke with her mother  Ruthanna Goldmann is taking her masters at this time  Increasing anxiety in the past 2 weeks  Topamax started about a month ago    She had ear piercings at the same time   Unsure which helped with the headaches  Weaning off Topamax at this time to see if the anxiety will improve  Mother is concerned that she needs an antianxiety medication  I would like to speak with Ruthanna Goldmann directly about this  I think she will benefit from counseling  If the anxiety is severe, then I am willing to try her on a low dose antidepressant     [place] K[place]  I left her a message   Spoke with patient  She is crying in the phone  Overwhelmed being in PT school in [address]  Very anxious, crying all the time  Having a hard time coping there  Willing to take an antidepressant at this point  I will start her on Sertraline 25mg  I also strongly recommended she see a counselor in school      Plan  Anxiety    · Sertraline HCl - 25 MG Oral Tablet; TAKE 1 TABLET QHS    Signatures   Electronically signed by : NANCY Agrawal ; Jan 27 2017 11:50AM EST                       (Author)

## 2018-01-22 VITALS
BODY MASS INDEX: 23.96 KG/M2 | WEIGHT: 144 LBS | SYSTOLIC BLOOD PRESSURE: 122 MMHG | DIASTOLIC BLOOD PRESSURE: 67 MMHG | HEART RATE: 63 BPM

## 2018-01-23 NOTE — PROGRESS NOTES
Assessment    1  Encounter for preventive health examination (V70 0) (Z00 00)   2  Need for influenza vaccination (V04 81) (Z23)   3  Immunity status testing (V72 61) (Z01 84)    Plan  Immunity status testing    · (1) RUBELLA IMMUNITY; Status:Active; Requested for:06Xou5967;    · (1) VARICELLA ZOSTER IMMUNITY(IGG); Status:Active; Requested for:75Czh9902;   Need for influenza vaccination    · Stop: Influenza    Discussion/Summary  health maintenance visit Currently, she eats an adequate diet and has an adequate exercise regimen  Scheduled in January Breast cancer screening: breast cancer screening is not indicated  Colorectal cancer screening: colorectal cancer screening is not indicated  Osteoporosis screening: bone mineral density testing is not indicated  The Declines flu vaccine  Form completed- rubella and varicella titers required  PPD negative, 2nd step next week  The patient was counseled regarding impressions  The treatment plan was reviewed with the patient/guardian  The patient/guardian understands and agrees with the treatment plan      Chief Complaint  wellness      History of Present Illness  HM, Adult Female: The patient is being seen for a health maintenance and 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  The last health maintenance visit was 1 year(s) ago  Social History: Household members include mother and father  She is unmarried  Work status: occupation: STUDENT  The patient has never smoked cigarettes  She reports occasional alcohol use and denies binge drinking  She has never used illicit drugs  General Health: The patient's health since the last visit is described as good  She has regular dental visits  The patient brushes 2 time(s) a day, flosses 1 time(s) a day and reports her last dental visit was 12/20/17  She complains of vision problems  Vision care includes wearing reading glasses and having regular eye examinations  She denies hearing loss   Hearing is normal  Immunizations status: not up to date The patient needs the following immunization(s): influenza vaccine  Lifestyle:  She consumes a diverse and healthy diet  She does not have any weight concerns  She exercises regularly  She does not use tobacco  The patient has never smoked cigarettes  She consumes alcohol  She reports occasional alcohol use  Alcohol concern: The patient has no concerns about alcohol abuse  She denies drug use  Reproductive health: the patient is premenopausal   she reports normal menses  she uses contraception  For contraception, she uses condoms  she is sexually active  Screening: Cervical cancer screening includes Has an upcoming appt on 1/08/18  risk screening reviewed and current  Review of Systems    Constitutional: recent weight gain, but no fever and no chills  ENT: no sore throat and no nasal discharge  Cardiovascular: no chest pain and no palpitations  Respiratory: no shortness of breath and no cough  Gastrointestinal: no abdominal pain, no constipation and no diarrhea  Genitourinary: dysmenorrhea, but no unexplained vaginal bleeding  Integumentary: no rashes  Neurological: headache  Psychiatric: no anxiety and no depression  Active Problems    1  Chronic migraine without aura without status migrainosus, not intractable (346 70)   (G43 709)   2  Migraine, unspecified, not intractable, without status migrainosus (346 90) (G43 909)   3  Need for meningococcus vaccine (V03 89) (Z23)   4  Need for Tdap vaccination (V06 1) (Z23)   5   Need for tuberculosis vaccination (V03 2) (Z23)    Past Medical History    · History of Abdominal discomfort (789 00) (R10 9)   · History of Acute otitis media, left (382 9) (H66 92)   · History of Anxiety (300 00) (F41 9)   · History of Closed Fracture Of The Tibia (823 80)   · History of Clostridium difficile colitis (008 45) (A04 72)   · History of Dysuria (788 1) (R30 0)   · History of abdominal pain (V13 89) (C02 363)   · History of abdominal pain (V13 89) (F20 947)   · History of diarrhea (V12 79) (X34 286)   · History of diarrhea (V12 79) (U88 153)   · History of serous otitis media (V12 49) (Z86 69)   · History of Impacted cerumen of both ears (380 4) (H61 23)   · History of Lactose intolerance (271 3) (E73 9)   · History of Lightheadedness (780 4) (R42)   · History of Loose stools (787 7) (R19 5)   · History of Need for prophylactic vaccination or inoculation against diphtheria and tetanus  (V06 5) (Z23)   · History of Need for prophylactic vaccination with Streptococcus pneumoniae  (Pneumococcus) and Influenza vaccines (V06 6) (Z23)   · History of Other fatigue (780 79) (R53 83)   · History of Sore of lower lip (528 5) (K13 0)   · History of Sore throat (462) (J02 9)    Surgical History    · History of Ankle Surgery    Family History  Mother    · Family history of asthma (V17 5) (Z82 5)   · Family history of MGUS (monoclonal gammopathy of unknown significance)  Father    · Family history of right bundle branch block (RBBB) (V17 49) (Z82 49)  Sister    · Family history of Pancreatitis  Maternal Grandfather    · Family history of Alzheimer's disease (V17 2) (Z82 0)   · Family history of Lyme disease (V18 8) (Z83 1)    Social History    · Caffeine use (V49 89) (F15 90)   · Exercises regularly   · Never a smoker   · No drug use   · Single   · Social alcohol use (Z78 9)   · Student    Current Meds   1  Dexamethasone 1 MG Oral Tablet; take 1 tab in am x 5 days with food and water; Therapy: 27DHQ0636 to (Last Rx:51Lla1130)  Requested for: 43FXO8592 Ordered   2  SUMAtriptan Succinate 100 MG Oral Tablet; TAKE 1 TABLET AT ONSET OF MIGRAINE   HEADACHE  MAY REPEAT IN 2 HOURS IF NEEDED, no more than 200mg in 24   and no more than 3 tabs in week; Therapy: 39Tpz9846 to (Last Rx:02Nov2017)  Requested for: 67ZAU5891 Ordered    Allergies    1  No Known Drug Allergies    2   Dairy    Vitals   Recorded: 11Laa0436 10:08AM   Heart Rate 71   Systolic 908   Diastolic 66   Height 5 ft 5 in   Weight 141 lb 6 oz   BMI Calculated 23 53   BSA Calculated 1 71   O2 Saturation 98     Physical Exam    Constitutional   General appearance: No acute distress, well appearing and well nourished  Head and Face   Head and face: Normal     Eyes   Conjunctiva and lids: No swelling, erythema or discharge  Ears, Nose, Mouth, and Throat   Otoscopic examination: Tympanic membranes translucent with normal light reflex  Canals patent without erythema  Oropharynx: Normal with no erythema, edema, exudate or lesions  Neck   Neck: Supple, symmetric, trachea midline, no masses  Thyroid: Normal, no thyromegaly  Pulmonary   Respiratory effort: No increased work of breathing or signs of respiratory distress  Auscultation of lungs: Clear to auscultation  Cardiovascular   Auscultation of heart: Normal rate and rhythm, normal S1 and S2, no murmurs  Abdomen   Abdomen: Non-tender, no masses  Lymphatic   Palpation of lymph nodes in neck: No lymphadenopathy  Musculoskeletal   Gait and station: Normal     Psychiatric   Orientation to person, place, and time: Normal     Mood and affect: Normal        Results/Data  PHQ-2 Adult Depression Screening 40Aoj5525 10:09AM User, Ahs     Test Name Result Flag Reference   PHQ-2 Adult Depression Score 0     Over the last two weeks, how often have you been bothered by any of the following problems?   Little interest or pleasure in doing things: Not at all - 0  Feeling down, depressed, or hopeless: Not at all - 0   PHQ-2 Adult Depression Screening Negative         Future Appointments    Date/Time Provider Specialty Site   01/05/2018 09:00 AM JEN Marquez Obstetrics/Gynecology [facility]   12/27/2017 11:00 AM MAB, Nurse Schedule  [facility]   12/28/2017 09:30 AM Camden Hodgson MD Neurology [facility]     Signatures   Electronically signed by : NANCY Herbert ; Dec 22 2017 10:34AM EST                       (Author)

## 2018-01-23 NOTE — MISCELLANEOUS
2017      RE: Stewart Hill  : [id number]      To Whom It May Concern,      I am writing this letter today regarding my patient, Stewart Hill  She had recent blood work done on 2017 which showed that she has immunity to both the Varicella and Rubella  She does not need to be revaccinated at this time  If you have any questions or require additional information, please feel free  to contact my office at the above number  Thank you          Sincerely,      NANCY Garcia/tt

## 2018-01-23 NOTE — PROGRESS NOTES
Assessment   1  Chronic migraine without aura without status migrainosus, not intractable (346 70)   (G43 709)    Plan  Chronic migraine without aura without status migrainosus, not intractable, Migraine  headache    · Start: SUMAtriptan Succinate 100 MG Oral Tablet; TAKE 1/2 TABLET AT ONSET OF  MIGRAINE HEADACHE  MAY REPEAT IN 2 HOURS IF NEEDED, no more than 200mg in  24 and no more than 3 tabs in week  Rx By: Dominic Ghotra; Dispense: 0 Days ; #:10 Tablet; Refill: 0;For: Chronic migraine   without aura without status migrainosus, not intractable, Migraine headache; RAYMUNDO = N;   Verified Transmission to [employer] Gianni Gregory; Last Updated By: [employer]; 4/21/2016 2:34:40 PM   · Follow-up visit in 3 months Evaluation and Treatment  Follow-up  Status: Hold For -  Scheduling  Requested for: 21Apr2016  Ordered; For: Chronic migraine without aura without status migrainosus, not intractable,   Migraine headache;  Ordered By: Dominic Ghotra  Performed:     Due: [id number]    Discussion/Summary  Discussion Summary: At this time headaches seem to be triggered by beer consumption, told to try and limit Etoh and/or other Etoh, will try sumtriptan to abort headaches  If headaches continue will try amitriptyline for preventative medication  Will have pt follow up in 3 months  Contact our office with questions/concerns  Told to contact our office with and/or go to ER with new/change in symptoms  Pt verbalized understanding  Medication Side Effects Reviewed: Possible side effects of new medications were reviewed with the patient/guardian today  Patient Guardian understands agrees: The treatment plan was reviewed with the patient/guardian   The patient/guardian understands and agrees with the treatment plan   Counseling Documentation With Imm: The patient, patient's family was counseled regarding instructions for management, risk factor reductions, prognosis, patient and family education, impressions, risks and benefits of treatment options, importance of compliance with treatment  Headache [facility]:   The patient was counseled regarding;   Discussed side effects of all medications prescribed today to the patient in detail  Patient education was completed today and we also discussed precautions for rebound headaches  When patient has a moderate to severe headache, they should seek rest, initiate relaxation and apply cold compresses to the head  Also recommended to the patient :  1  Maintain regular sleep schedule  2  Limit over the counter medications  (No more than 3 times a week)  3  Maintain headache diary  4  Limit caffeine to 1-2 cups a day or less  5  Avoid dietary trigger  (list given to the patient and reviewed with them)  6  Patient is to have regular frequent meals to prevent headache onset  Chief Complaint  Chief Complaint Free Text Note Form: follow up headaches      History of Present Illness  HPI: We had the pleasure of evaluating Pauly Boggs in neurological follow up today  As you know she is a 23year old right handed female  Since last seen headaches have worsened  What is your current pain level - 0/10  Headaches started at what age - Oct of 2013  She was having nose bleeds and had cauterization done and that trigger headaches     Accident or injury prior to onset of headaches - none  How often do the headaches occur - once a week  What time of the day do the headaches start - bedtime  How long do the headaches last - 1-5 days  Are you ever headache free - yes  Where are they located - frontal, orbits  What is the intensity of pain -average pain level 7/10, up to 8-9/10  Any warning prior to headache and how long do they last - none  Describe your usual headache - Pressure, Throbbing, Pounding, dull  Associated symptoms: nausea, vomiting, Decrease appetite, photophobia, blurred vision, phonophobia, sensitive to smells, Problem with concentration, light-headed or dizzy, stiff or sore neck, prefer to be alone and in a dark room, unable to work  Headaches are worse if the patient: cough, sneeze, bending over  Headache trigger: watching TV without glasses, lack of caffeine  Have you had epidural injections or transforaminal injections performed - no  What medications do you take or have you taken for your headaches? PREVENTIVE: mag, B2  ABORTIVE: Excedrin, Advil, migraine headaches  Non-Medical/Alternative Treatments used in the past for headaches: chiropractor      Review of Systems  Neurological ROS:   Constitutional: no fever, no chills, no recent weight gain, no recent weight loss, no complaints of feeling tired, no changes in appetite  HEENT:  no sinus problems, not feeling congested, no blurred vision, no dryness of the eyes, no eye pain, no hearing loss, no tinnitus, no mouth sores, no sore throat, no hoarseness, no dysphagia, no masses, no bleeding  Cardiovascular:  no chest pain or pressure, no palpitations present, the heart rate was not rapid or irregular, no swelling in the arms or legs, no poor circulation  Respiratory:  no unusual or persistant cough, no shortness of breath with or without exertion  Gastrointestinal:  no nausea, no vomiting, no diarrhea, no abdominal pain, no changes in bowel habits, no melena, no loss of bowel control  Genitourinary:  no incontinence, no feelings of urinary urgency, no increase in frequency, no urinary hesitancy, no dysuria, no hematuria  Musculoskeletal:  no arthralgias, no myalgias, no immobility or loss of function, no head/neck/back pain, no pain while walking  Integumentary  no masses, no rash, no skin lesions, no livedo reticularis  Psychiatric:  no anxiety, no depression, no mood swings, no psychiatric hospitalizations, no sleep problems  Endocrine   no unusual weight loss or gain, no excessive urination, no excessive thirst, no hair loss or gain, no hot or cold intolerance, no menstrual period change or irregularity, no loss of sexual ability or drive, no erection difficulty, no nipple discharge  Hematologic/Lymphatic:  no unusual bleeding, no tendency for easy bruising, no clotting skin or lumps  Neurological General: headache  Neurological Mental Status:  no confusion, no mood swings, no alteration or loss of consciousness, no difficulty expressing/understanding speech, no memory problems  Neurological Cranial Nerves:  no blurry or double vision, no loss of vision, no face drooping, no facial numbness or weakness, no taste or smell loss/changes, no hearing loss or ringing, no vertigo or dizziness, no dysphagia, no slurred speech  Neurological Motor findings include:  no tremor, no twitching, no cramping(pre/post exercise), no atrophy  Neurological Coordination:  no unsteadiness, no vertigo or dizziness, no clumsiness, no problems reaching for objects  Neurological Sensory:  no numbness, no pain, no tingling, does not fall when eyes closed or taking a shower  Neurological Gait:  no difficulty walking, not falling to one side, no sensation of being pushed, has not had falls  ROS Reviewed:   ROS reviewed  Active Problems   1  Loose stools (787 7) (R19 5)  2  Migraine headache (346 90) (G43 909)    Past Medical History   1  History of Abdominal discomfort (789 00) (R10 9)  2  History of Acute otitis media, left (382 9) (H66 92)  3  History of Closed Fracture Of The Tibia (823 80)  4  History of Clostridium difficile colitis (008 45) (A04 7)  5  History of Dysuria (788 1) (R30 0)  6  History of abdominal pain (V13 89) (Z87 898)  7  History of abdominal pain (V13 89) (Z87 898)  8  History of diarrhea (V12 79) (Z87 898)  9  History of diarrhea (V12 79) (Z87 898)  10  History of serous otitis media (V12 49) (Z86 69)  11  History of Impacted cerumen of both ears (380 4) (H61 23)  12  History of Lactose intolerance (271 3) (E73 9)  13  History of Lightheadedness (780 4) (R42)  14   History of Need for prophylactic vaccination or inoculation against diphtheria and tetanus    (V06 5) (Z23)  15  History of Need for prophylactic vaccination with Streptococcus pneumoniae    (Pneumococcus) and Influenza vaccines (V06 6) (Z23)  16  History of Other fatigue (780 79) (R53 83)  17  History of Sore of lower lip (528 5) (K13 0)  18  History of Sore throat (462) (J02 9)  Active Problems And Past Medical History Reviewed: The active problems and past medical history were reviewed and updated today  Surgical History   1  History of Ankle Surgery  Surgical History Reviewed: The surgical history was reviewed and updated today  Family History   1  Family history of asthma (V17 5) (Z82 5)  2  Family history of MGUS (monoclonal gammopathy of unknown significance)   3  Family history of right bundle branch block (RBBB) (V17 49) (Z82 49)   4  Family history of Pancreatitis   5  Family history of Alzheimer's disease (V17 2) (Z82 0)  6  Family history of Lyme disease (V18 8) (Z83 1)  Family History Reviewed: The family history was reviewed and updated today  Social History    · Caffeine use (V49 89) (F15 90)   · Exercises regularly   · Never a smoker   · No drug use   · Single   · Social alcohol use (Z78 9)   · Student  Social History Reviewed: The social history was reviewed and updated today  The social history was reviewed and is unchanged  Current Meds  1  B-2 100 MG Oral Tablet; Therapy: (Recorded:12Jan2016) to Recorded  2  Magnesium 250 MG Oral Tablet; Therapy: 08CKH4422 to Recorded  3  Multiple Vitamin Oral Tablet; TAKE 1 TABLET DAILY; Therapy: 71BTE9465 to (Evaluate:25Nov2015) Recorded  4  Probiotic Oral Capsule; Therapy: 77OUA5068 to Recorded  5  Vitamin B-12 500 MCG Oral Tablet; TAKE 1 TABLET DAILY; Therapy: 59PYV6294 to Recorded  6  Vitamin C 500 MG Oral Capsule; Therapy: 47DFC5077 to Recorded    Allergies   1  No Known Drug Allergies   2   Dairy    Vitals  Signs [Data Includes: Current Encounter] Recorded: 21Apr2016 02:03PM   Heart Rate: 51  Respiration: 14  Systolic: 708  Diastolic: 78  Weight: 791 lb 5 oz    Physical Exam    Constitutional   General appearance: No acute distress, well appearing and well nourished  Musculoskeletal   Gait and station: Normal gait, stance and balance  Muscle strength: Normal strength throughout  Muscle tone: No atrophy, abnormal movements, flaccidity, cogwheeling or spasticity  Neurologic   2nd cranial nerve: Normal     3rd, 4th, and 6th cranial nerves: Normal     5th cranial nerve: Normal     7th cranial nerve: Normal     8th cranial nerve: Normal     9th cranial nerve: Normal     11th cranial nerve: Normal     12th cranial nerve: Normal     Sensation: Normal     Reflexes: Normal     Coordination: Normal        Attending Note  Collaborating Physician Note: Collaborating Note:1  I agree with the Advanced Practitioner note1         1 Amended By: Jovani Munoz; Apr 21 2016 8:13 PM EST    Message  Return to work or school:   Monica Figueroa is under my professional care  She was seen in my office on 4/21/16     She is able to return to school on 4/21/16     Pablo Madrigal PA-C  Future Appointments    Date/Time Provider Specialty Site   10/05/2016 10:30 AM Gage Vasquez, [facility]       1  Amended By: Gage Vasquez; Apr 21 2016 2:54 PM EST   Signatures   Electronically signed by : MONIQUE Hinton;  Apr 21 2016  2:54PM EST                       (Author)    Electronically signed by : Edwardo Hill MD; Apr 21 2016  8:14PM EST                       (Co-participant)

## 2018-01-23 NOTE — MISCELLANEOUS
Message   Recorded as Task   Date: 01/09/2018 04:58 PM, Created By: Angel Helms   Task Name: Result Follow Up   Assigned To: [facility],Team   Regarding Patient: Daisy Mustafa, Status: In Progress   CommentHedwig Pontiff - 09 Jan 2018 4:58 PM     TASK CREATED  Neg gc/chl  Please notify patient by cell per her request     Thanks   Rana Aquas - 09 Jan 2018 5:16 PM     TASK EDITED   Rana Aquas - 09 Jan 2018 5:17 PM     TASK EDITED   Rana Aquas - 09 Jan 2018 5:18 PM     TASK EDITED  I left a detailed voice mail on cell number  Active Problems    1  Cervical cancer screening (V76 2) (Z12 4)   2  Chronic migraine without aura without status migrainosus, not intractable (346 70)   (G43 704)   3  Encounter for gynecological examination without abnormal finding (V72 31) (Z01 419)   4  Personal history of exposure to potentially hazardous body fluids (V15 85) (Z77 21)    Current Meds   1  No Reported Medications  Requested for: 60AZD7947 Recorded    Allergies    1  No Known Drug Allergies    2   Dairy    Signatures   Electronically signed by : Karen Patel, ; Jan 9 2018  5:18PM EST                       (Author)

## 2018-01-24 VITALS
BODY MASS INDEX: 23.56 KG/M2 | HEIGHT: 65 IN | DIASTOLIC BLOOD PRESSURE: 66 MMHG | SYSTOLIC BLOOD PRESSURE: 126 MMHG | WEIGHT: 141.38 LBS | HEART RATE: 71 BPM | OXYGEN SATURATION: 98 %

## 2018-01-24 VITALS
WEIGHT: 140.13 LBS | HEIGHT: 64 IN | BODY MASS INDEX: 23.92 KG/M2 | SYSTOLIC BLOOD PRESSURE: 120 MMHG | DIASTOLIC BLOOD PRESSURE: 80 MMHG

## 2018-02-14 DIAGNOSIS — G43.709 CHRONIC MIGRAINE WITHOUT AURA WITHOUT STATUS MIGRAINOSUS, NOT INTRACTABLE: Primary | ICD-10-CM

## 2018-02-14 RX ORDER — VENLAFAXINE HYDROCHLORIDE 75 MG/1
75 CAPSULE, EXTENDED RELEASE ORAL DAILY
Qty: 30 CAPSULE | Refills: 1 | Status: SHIPPED | OUTPATIENT
Start: 2018-02-14 | End: 2018-08-29

## 2018-02-20 DIAGNOSIS — G43.709 CHRONIC MIGRAINE WITHOUT AURA WITHOUT STATUS MIGRAINOSUS, NOT INTRACTABLE: Primary | ICD-10-CM

## 2018-02-20 RX ORDER — DEXAMETHASONE 1 MG
TABLET ORAL
Refills: 0 | COMMUNITY
Start: 2017-12-01 | End: 2018-02-20 | Stop reason: SDUPTHER

## 2018-02-20 RX ORDER — DEXAMETHASONE 1 MG
TABLET ORAL
Qty: 5 TABLET | Refills: 0 | Status: SHIPPED | OUTPATIENT
Start: 2018-02-20 | End: 2018-03-09 | Stop reason: SDUPTHER

## 2018-02-20 NOTE — TELEPHONE ENCOUNTER
Patient called in to report a migraine x 4-5 days requesting a refill of decadron last given at end of december

## 2018-02-26 NOTE — MISCELLANEOUS
Message   Recorded as Task   Date: 01/12/2018 08:11 AM, Created By: Zee Talley   Task Name: Result Follow Up   Assigned To: [facility],Team   Regarding Patient: Sidney Spine, Status: In Progress   Viky Payton - 12 Jan 2018 8:11 AM     TASK CREATED  Normal pap   Please notify patient by cell per pt request   Alejandra Ram - 12 Jan 2018 8:16 AM     TASK IN PROGRESS   Alejandra Ram - 12 Jan 2018 8:18 AM     TASK EDITED  Per HIPPA form - lm of pap results  If any questions tot call back  Active Problems    1  Cervical cancer screening (V76 2) (Z12 4)   2  Chronic migraine without aura without status migrainosus, not intractable (346 70)   (G43 709)   3  Encounter for gynecological examination without abnormal finding (V72 31) (Z01 419)   4  Personal history of exposure to potentially hazardous body fluids (V15 85) (Z77 21)    Current Meds   1  No Reported Medications  Requested for: 06CKO8949 Recorded    Allergies    1  No Known Drug Allergies    2   Dairy    Signatures   Electronically signed by : Eve Pan, ; Jan 12 2018  8:18AM EST                       (Author)

## 2018-03-08 RX ORDER — SERTRALINE HYDROCHLORIDE 25 MG/1
1 TABLET, FILM COATED ORAL
COMMUNITY
Start: 2017-01-27 | End: 2018-03-09

## 2018-03-08 RX ORDER — SUMATRIPTAN 100 MG/1
TABLET, FILM COATED ORAL
COMMUNITY
Start: 2016-04-21 | End: 2018-04-10 | Stop reason: SDUPTHER

## 2018-03-08 RX ORDER — SUMATRIPTAN 25 MG/1
25 TABLET, FILM COATED ORAL
COMMUNITY
End: 2018-03-09

## 2018-03-08 RX ORDER — ONDANSETRON 4 MG/1
4 TABLET, ORALLY DISINTEGRATING ORAL
COMMUNITY
Start: 2016-12-06 | End: 2018-03-09

## 2018-03-08 NOTE — PROGRESS NOTES
Patient ID: Jeanette Baron is a 20 y o  female  Assessment/Plan:    Chronic migraine without aura without status migrainosus, not intractable  Preventative: Will increase venlafaxine from 75 mg  We will add a 37 5 mg capsule for 14 days  This will increase to 150 mg total after 14 days  If develops side effects with the increase may go back to a 75 mg and a 37 5 mg capsule   we did discuss if the venlafaxine is not effective to apply for Botox as has tried and failed multiple medications    Abortive:   at onset of migraine take sumatriptan  May repeat in 2 hours if needed  In addition, it may try prochlorperazine to help break your migraine  Would recommend trying this if needed to repeat the 2nd dose or need to take the dexamethasone    Call us with an update after 3-4 weeks to send in refills of the Venlafaxine         Problem List Items Addressed This Visit        Cardiovascular and Mediastinum    Chronic migraine without aura without status migrainosus, not intractable     Preventative: Will increase venlafaxine from 75 mg  We will add a 37 5 mg capsule for 14 days  This will increase to 150 mg total after 14 days  If develops side effects with the increase may go back to a 75 mg and a 37 5 mg capsule   we did discuss if the venlafaxine is not effective to apply for Botox as has tried and failed multiple medications    Abortive:   at onset of migraine take sumatriptan  May repeat in 2 hours if needed  In addition, it may try prochlorperazine to help break your migraine    Would recommend trying this if needed to repeat the 2nd dose or need to take the dexamethasone    Call us with an update after 3-4 weeks to send in refills of the Venlafaxine         Relevant Medications    SUMAtriptan (IMITREX) 100 mg tablet    venlafaxine (EFFEXOR-XR) 37 5 mg 24 hr capsule    dexamethasone (DECADRON) 1 mg tablet    prochlorperazine (COMPAZINE) 10 mg tablet           Follow up in 3 months    Subjective:    HPI     We had the pleasure of evaluating Rocky Wray in neurological follow up today  As you know she is a 20year old right handed female  She goes to  school in [place], [place]  She is here today for evaluation of her headaches  Chronic migraine headaches:   PREVENTIVE used: mag- caused headaches, B2, amitriptyline, venlafaxine-decreasingly effective,  zonegran, Zoloft ( did help with headaches but caused anxiety)  ABORTIVE used: Excedrin, Advil, Sumatriptan, IV ketorolac- not effective, dexamethasone- effective, olanzapine- effective  - Non-Medical/Alternative Treatments used in the past for headaches: chiropractor  Headache trigger: Winter time, watching TV without glasses, Possibly one trigger is alcohol, as she usually gets a migraine 2-3 days after consuming alcohol  Aura: none    How often do the headaches occur - can be variable 5/week, but still has 2-3/week on a good week and tend to get worse in the fall and in winter  She has >15+ migraine days a month  What time of the day do the headaches start - anytime  How long do the headaches last - all day or until she takes Sumatriptan which helps most of the time but 50% of the time, they will occur again the next day and that is when she has to take the Decadron     Where are they located - frontal, orbits, at time they radiates to the back of her head  What is the intensity of pain -average pain level 6/10, up to 9-10/10  Describe your usual headache - Pressure, Throbbing, sharp pain  Associated symptoms: nausea, decrease appetite, photophobia, blurred vision, phonophobia, sensitive to smells, Problem with concentration, light-headed or dizzy, stiff or sore neck, prefer to be alone and in a dark room, unable to work           The following portions of the patient's history were reviewed and updated as appropriate: allergies, current medications, past family history, past medical history, past social history, past surgical history and problem list          Objective:    Blood pressure 130/64, pulse 82, weight 62 6 kg (138 lb)  Physical Exam   Constitutional: She appears well-developed and well-nourished  HENT:   Head: Normocephalic and atraumatic  Eyes: EOM are normal  Pupils are equal, round, and reactive to light  Neck: Normal range of motion  Cardiovascular: Normal rate  Pulmonary/Chest: Effort normal    Musculoskeletal: Normal range of motion  Neurological: She has normal strength and normal reflexes  Gait and coordination normal    Skin: Skin is warm and dry  Psychiatric: She has a normal mood and affect  Her speech is normal    Nursing note and vitals reviewed  Neurological Exam    Mental Status  The patient is alert and oriented to person, place, time, and situation  Her recent and remote memory are normal  She has no visuospatial neglect  Her speech is normal  Her language is fluent with no aphasia  She has normal attention span and concentration  She has a normal fund of knowledge  Cranial Nerves    CN II: The patient's visual acuity and visual fields are normal   CN III, IV, VI: The patient's pupils are equally round and reactive to light and ocular movements are normal   CN V: The patient has normal facial sensation  CN VII:  The patient has symmetric facial movement  CN VIII:  The patient's hearing is normal   CN IX, X: The patient has symmetric palate movement and normal gag reflex  CN XI: The patient's shoulder shrug strength is normal   CN XII: The patient's tongue is midline without atrophy or fasciculations  Motor  The patient has normal muscle bulk throughout  Her overall muscle tone is normal throughout  Her strength is 5/5 throughout all four extremities  Sensory  The patient's sensation is normal in all four extremities   She has normal cortical sensation    Reflexes  Deep tendon reflexes are 2+ and symmetric in all four extremities with downgoing toes bilaterally  Gait and Coordination  The patient has normal gait and station and normal casual, toe, heel, and tandem gait  She has normal coordination bilaterally  ROS:    Review of Systems   Constitutional: Positive for fatigue  HENT: Negative  Eyes: Negative  Respiratory: Negative  Cardiovascular: Negative  Gastrointestinal: Negative  Endocrine: Negative  Genitourinary: Negative  Musculoskeletal: Negative  Skin: Negative  Allergic/Immunologic: Negative  Neurological: Positive for light-headedness and headaches  Hematological: Negative  Psychiatric/Behavioral: Negative

## 2018-03-09 ENCOUNTER — OFFICE VISIT (OUTPATIENT)
Dept: NEUROLOGY | Facility: CLINIC | Age: 23
End: 2018-03-09
Payer: COMMERCIAL

## 2018-03-09 VITALS
WEIGHT: 138 LBS | BODY MASS INDEX: 23.69 KG/M2 | HEART RATE: 82 BPM | SYSTOLIC BLOOD PRESSURE: 130 MMHG | DIASTOLIC BLOOD PRESSURE: 64 MMHG

## 2018-03-09 DIAGNOSIS — G43.709 CHRONIC MIGRAINE WITHOUT AURA WITHOUT STATUS MIGRAINOSUS, NOT INTRACTABLE: ICD-10-CM

## 2018-03-09 PROCEDURE — 99214 OFFICE O/P EST MOD 30 MIN: CPT | Performed by: PHYSICIAN ASSISTANT

## 2018-03-09 RX ORDER — PROCHLORPERAZINE MALEATE 10 MG
TABLET ORAL
Qty: 10 TABLET | Refills: 0 | Status: SHIPPED | OUTPATIENT
Start: 2018-03-09 | End: 2018-08-29

## 2018-03-09 RX ORDER — VENLAFAXINE HYDROCHLORIDE 37.5 MG/1
CAPSULE, EXTENDED RELEASE ORAL
Qty: 60 CAPSULE | Refills: 0 | Status: SHIPPED | OUTPATIENT
Start: 2018-03-09 | End: 2018-04-10

## 2018-03-09 RX ORDER — DEXAMETHASONE 1 MG
TABLET ORAL
Qty: 5 TABLET | Refills: 0 | Status: SHIPPED | OUTPATIENT
Start: 2018-03-09 | End: 2018-11-22 | Stop reason: HOSPADM

## 2018-03-09 NOTE — PROGRESS NOTES
Review of Systems   Constitutional: Positive for fatigue  Negative for appetite change and fever  HENT: Negative  Negative for hearing loss, tinnitus, trouble swallowing and voice change  Eyes: Negative  Negative for photophobia and pain  Respiratory: Negative  Negative for shortness of breath  Cardiovascular: Negative  Negative for palpitations  Gastrointestinal: Negative  Negative for nausea and vomiting  Endocrine: Negative  Negative for cold intolerance and heat intolerance  Genitourinary: Negative  Negative for dysuria, frequency and urgency  Musculoskeletal: Negative  Negative for myalgias and neck pain  Skin: Negative  Negative for rash  Allergic/Immunologic: Negative  Neurological: Positive for light-headedness and headaches  Negative for dizziness, tremors, seizures, syncope, facial asymmetry, speech difficulty, weakness and numbness  Hematological: Negative  Does not bruise/bleed easily  Psychiatric/Behavioral: Negative  Negative for confusion, hallucinations and sleep disturbance

## 2018-03-09 NOTE — PATIENT INSTRUCTIONS
Preventative: Will increase venlafaxine from 75 mg  We will add a 37 5 mg capsule for 14 days  This will increase to 150 mg total after 14 days  If develops side effects with the increase may go back to a 75 mg and a 37 5 mg capsule   we did discuss if the venlafaxine is not effective to apply for Botox as has tried and failed multiple medications    Abortive:   at onset of migraine take sumatriptan  May repeat in 2 hours if needed  In addition, it may try prochlorperazine to help break your migraine  Would recommend trying this if needed to repeat the 2nd dose or need to take the dexamethasone    Call us with an update after 3-4 weeks to send in refills of the Venlafaxine    May take these over-the-counter supplements to decrease intensity and frequency of migraines  - Magnesium Oxide 400-500 mg a day  If any diarrhea or upset stomach, decrease dose  as tolerated  -  B2 200 mg a day  May take once a day in am  This supplement will change the color of the urine to fluorescent yellow no matter how hydrated, which is normal      Discussed side effects of all medications prescribed today to the patient in detail  Patient education was completed today and we also discussed precautions for rebound headaches  When patient has a moderate to severe headache, they should seek rest, initiate relaxation and apply cold compresses to the head  1  Maintain regular sleep schedule  Adults need at least 7-8 hours of uninterrupted a night  2  Limit over the counter medications such as Tylenol, Ibuprofen, Aleve, Excedrin  (No more than 3 times a week)  3  Maintain headache diary  We discussed an [employer] for a smart phone is "Migraine eDiary"  4  Limit caffeine to 1-2 cups a day or less  5  Avoid dietary trigger  (list given to the patient and reviewed with them)  6  Patient is to have regular frequent meals to prevent headache onset      7   Please drink at least 64 ounces of water a day to help remain hydrated

## 2018-03-09 NOTE — ASSESSMENT & PLAN NOTE
Preventative: Will increase venlafaxine from 75 mg  We will add a 37 5 mg capsule for 14 days  This will increase to 150 mg total after 14 days  If develops side effects with the increase may go back to a 75 mg and a 37 5 mg capsule   we did discuss if the venlafaxine is not effective to apply for Botox as has tried and failed multiple medications    Abortive:   at onset of migraine take sumatriptan  May repeat in 2 hours if needed  In addition, it may try prochlorperazine to help break your migraine    Would recommend trying this if needed to repeat the 2nd dose or need to take the dexamethasone    Call us with an update after 3-4 weeks to send in refills of the Venlafaxine

## 2018-04-09 ENCOUNTER — TELEPHONE (OUTPATIENT)
Dept: NEUROLOGY | Facility: CLINIC | Age: 23
End: 2018-04-09

## 2018-04-09 DIAGNOSIS — G43.709 CHRONIC MIGRAINE WITHOUT AURA WITHOUT STATUS MIGRAINOSUS, NOT INTRACTABLE: Primary | ICD-10-CM

## 2018-04-09 NOTE — TELEPHONE ENCOUNTER
Pt called states that she takes Venlafaxine 75 mg, 1 capsule daily bedtime and 37 5 mg, 2 capsule daily at bedtime  She reports as not effective and "it's just making me tired"  Pt wants to be off it completely  Pt takes sumatriptan 100 mg at onset of migraine and decadron 1 mg prn  Denies migraine at this time  Requesting refill (sumatriptan 100 mg)   Pls send rx to [facility]# [id number] [phone]

## 2018-04-10 DIAGNOSIS — G43.709 CHRONIC MIGRAINE WITHOUT AURA WITHOUT STATUS MIGRAINOSUS, NOT INTRACTABLE: ICD-10-CM

## 2018-04-10 RX ORDER — SUMATRIPTAN 100 MG/1
100 TABLET, FILM COATED ORAL AS NEEDED
Qty: 9 TABLET | Refills: 0 | Status: SHIPPED | OUTPATIENT
Start: 2018-04-10 | End: 2018-06-11 | Stop reason: SDUPTHER

## 2018-04-10 RX ORDER — SUMATRIPTAN 100 MG/1
100 TABLET, FILM COATED ORAL AS NEEDED
Qty: 9 TABLET | Refills: 0 | Status: SHIPPED | OUTPATIENT
Start: 2018-04-10 | End: 2018-04-10 | Stop reason: SDUPTHER

## 2018-04-10 NOTE — TELEPHONE ENCOUNTER
Please have her take the correct dose: 75 mg in the morning only  Let's have her see if this helps to stop the fatigue    Thank you

## 2018-04-10 NOTE — TELEPHONE ENCOUNTER
Clarified with pt- she takes Venlafaxine 75 mg, 1 capsule daily at bedtime along with 37 5 mg, 2 capsule daily at bedtime  I did tell her that she should only be taking 75 mg daily at bedtime  She wants to know if she can stop taking it since it's making her tired  Pls send refill rx sumatriptan to [facility] #[id number] pharmacy       Thanks

## 2018-04-10 NOTE — TELEPHONE ENCOUNTER
Patient should only be taking 75mg of Venlafaxine in the morning  Can you clarify? And, if she is not getting migraines, then it is effective  I will send imitrex to pharm    Please see what dose of Venlafaxine she is taking

## 2018-04-10 NOTE — TELEPHONE ENCOUNTER
pt made aware  she states that dose was increased at last office visit to 150mg daily  i do see this in your note  she states that she was already tired when taking the 75mg daily and she would like to go off med completely  still getting headaches  one or two a week      currently taking 75mg 1 tab and 1 cap 37 5mg at hs   please advise

## 2018-04-10 NOTE — TELEPHONE ENCOUNTER
Have her take 75 mg for 5 days  37 5 mg for 5 days then 37 5 mg every other day for 6 days and then stop  However call the office if she worsens    We did discuss Botox at the last visit and she may need this if her headaches worsen again

## 2018-06-11 DIAGNOSIS — G43.709 CHRONIC MIGRAINE WITHOUT AURA WITHOUT STATUS MIGRAINOSUS, NOT INTRACTABLE: ICD-10-CM

## 2018-06-11 RX ORDER — SUMATRIPTAN 100 MG/1
100 TABLET, FILM COATED ORAL AS NEEDED
Qty: 9 TABLET | Refills: 0 | Status: SHIPPED | OUTPATIENT
Start: 2018-06-11 | End: 2018-08-29 | Stop reason: SDUPTHER

## 2018-06-20 ENCOUNTER — OFFICE VISIT (OUTPATIENT)
Dept: NEUROLOGY | Facility: CLINIC | Age: 23
End: 2018-06-20
Payer: COMMERCIAL

## 2018-06-20 VITALS
BODY MASS INDEX: 22.51 KG/M2 | DIASTOLIC BLOOD PRESSURE: 66 MMHG | WEIGHT: 135.1 LBS | HEART RATE: 73 BPM | SYSTOLIC BLOOD PRESSURE: 123 MMHG | HEIGHT: 65 IN

## 2018-06-20 DIAGNOSIS — G43.709 CHRONIC MIGRAINE WITHOUT AURA WITHOUT STATUS MIGRAINOSUS, NOT INTRACTABLE: Primary | ICD-10-CM

## 2018-06-20 PROCEDURE — 99214 OFFICE O/P EST MOD 30 MIN: CPT | Performed by: PHYSICIAN ASSISTANT

## 2018-06-20 RX ORDER — ACETAMINOPHEN, ASPIRIN AND CAFFEINE 250; 250; 65 MG/1; MG/1; MG/1
1 TABLET, FILM COATED ORAL AS NEEDED
COMMUNITY

## 2018-06-20 RX ORDER — RIZATRIPTAN BENZOATE 10 MG/1
10 TABLET ORAL ONCE AS NEEDED
Qty: 9 TABLET | Refills: 0 | Status: SHIPPED | OUTPATIENT
Start: 2018-06-20 | End: 2018-08-29

## 2018-06-20 NOTE — PATIENT INSTRUCTIONS
Preventative:  Apply for Aimovig  If [facility] S[facility]in is not effective (or denied) to apply for Botox as has tried and failed multiple medications    Abortive:   at onset of migraine take rizariptan  May repeat in 2 hours if needed  In addition, it may try prochlorperazine to help break your migraine  Would recommend trying this if needed to repeat the 2nd dose or need to take the dexamethasone      May take these over-the-counter supplements to decrease intensity and frequency of migraines  - Magnesium Oxide 400-500 mg a day  If any diarrhea or upset stomach, decrease dose  as tolerated  -  B2 200 mg a day  May take once a day in am  This supplement will change the color of the urine to fluorescent yellow no matter how hydrated, which is normal      Discussed side effects of all medications prescribed today to the patient in detail  Patient education was completed today and we also discussed precautions for rebound headaches  When patient has a moderate to severe headache, they should seek rest, initiate relaxation and apply cold compresses to the head  1  Maintain regular sleep schedule  Adults need at least 7-8 hours of uninterrupted a night  2  Limit over the counter medications such as Tylenol, Ibuprofen, Aleve, Excedrin  (No more than 3 times a week)  3  Maintain headache diary  We discussed an [employer] for a smart phone is "Migraine eDiary"  4  Limit caffeine to 1-2 cups a day or less  5  Avoid dietary trigger  (list given to the patient and reviewed with them)  6  Patient is to have regular frequent meals to prevent headache onset  7   Please drink at least 64 ounces of water a day to help remain hydrated

## 2018-06-20 NOTE — ASSESSMENT & PLAN NOTE
Preventative:  Apply for Aimovig  If Sally Camargo is not effective (or denied) to apply for Botox as has tried and failed multiple medications    Abortive:   at onset of migraine take rizariptan  May repeat in 2 hours if needed  In addition, it may try prochlorperazine to help break your migraine    Would recommend trying this if needed to repeat the 2nd dose or need to take the dexamethasone

## 2018-07-13 ENCOUNTER — TELEPHONE (OUTPATIENT)
Dept: NEUROLOGY | Facility: CLINIC | Age: 23
End: 2018-07-13

## 2018-07-13 DIAGNOSIS — G43.709 CHRONIC MIGRAINE WITHOUT AURA WITHOUT STATUS MIGRAINOSUS, NOT INTRACTABLE: Primary | ICD-10-CM

## 2018-07-13 NOTE — TELEPHONE ENCOUNTER
When did migraine start? 2 weeks ago  Location/Description: throbbing pain, left eye  Pain scale: 2 but worried it will get up to 7 or 8  Associated symptoms:nausea  Precipitating factors: no particular thing  Alleviating factors: otc medication  What medications have you tried for this migraine headache? OTC NSAID's excedrin for the past 2 weeks and rizatriptan yesterday  Advised not to take any more of each med due to overuse; she verbalized understanding  Current migraine medications are confirmed as:  No migraine meds daily  Did take rizatriptan 2 yesterday (took pain away for about 8 hours and then it comes back      Medications tried in the past? None; "I don't respond well"

## 2018-07-16 RX ORDER — DEXAMETHASONE 2 MG/1
TABLET ORAL
Qty: 3 TABLET | Refills: 0 | Status: SHIPPED | OUTPATIENT
Start: 2018-07-16 | End: 2018-08-02 | Stop reason: SDUPTHER

## 2018-07-16 NOTE — TELEPHONE ENCOUNTER
Pt thinks her migraine from last week resolved, but she is asking for the decadron to be called in for if it was to return

## 2018-07-16 NOTE — TELEPHONE ENCOUNTER
Could you please call back and check up on the patient  If she still has a headache we can call in Decadron for 3 days  Decadron would be 2 mg in a m  With meals

## 2018-08-02 DIAGNOSIS — G43.709 CHRONIC MIGRAINE WITHOUT AURA WITHOUT STATUS MIGRAINOSUS, NOT INTRACTABLE: ICD-10-CM

## 2018-08-02 RX ORDER — DEXAMETHASONE 2 MG/1
TABLET ORAL
Qty: 3 TABLET | Refills: 0 | Status: SHIPPED | OUTPATIENT
Start: 2018-08-02 | End: 2018-08-29 | Stop reason: SDUPTHER

## 2018-08-02 NOTE — TELEPHONE ENCOUNTER
Patient reports 7/10 migraine, photosensitivity, with nausea starting 4 days ago  Took Rizatriptan today without relief  Asking for another script of Decadron, recently filled on July 16 for 3 pills- which she used  Please advise

## 2018-08-03 RX ORDER — DEXAMETHASONE 2 MG/1
TABLET ORAL
Qty: 3 TABLET | Refills: 0 | OUTPATIENT
Start: 2018-08-03

## 2018-08-24 NOTE — PROGRESS NOTES
Patient ID: Dontae Sweeney is a 20 y o  female  Assessment/Plan:    Chronic migraine without aura without status migrainosus, not intractable  Preventative:  Continue Aimovig 140 every 30 days     Abortive: At onset of migraine take sumatriptan  May repeat in 2 hours if needed  In addition, it may try prochlorperazine to help break your migraine  Would recommend trying this if needed to repeat the 2nd dose or need to take the dexamethasone         Problem List Items Addressed This Visit        Cardiovascular and Mediastinum    Chronic migraine without aura without status migrainosus, not intractable     Preventative:  Continue Aimovig 140 every 30 days     Abortive: At onset of migraine take sumatriptan  May repeat in 2 hours if needed  In addition, it may try prochlorperazine to help break your migraine  Would recommend trying this if needed to repeat the 2nd dose or need to take the dexamethasone         Relevant Medications    Erenumab-aooe (AIMOVIG 140 DOSE) 70 MG/ML SOAJ    SUMAtriptan (IMITREX) 100 mg tablet    dexamethasone (DECADRON) 2 mg tablet           Subjective:    HPI  We had the pleasure of evaluating James Wray in neurological follow up today  As you know she is a 20year old right handed female  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[place]   She is here today for evaluation of her headaches       Chronic migraine headaches:   PREVENTIVE used: mag- caused headaches, B2, amitriptyline, venlafaxine (sedating),  zonegran, Zoloft ( did help with headaches but caused anxiety), Aimovig  ABORTIVE used: Excedrin, Advil, Sumatriptan, IV ketorolac- not effective, dexamethasone- effective, olanzapine- effective, Compazine  Non-Medical/Alternative Treatments used in the past for headaches: chiropractor  Headache trigger: Winter time, watching TV without glasses, Possibly one trigger is alcohol, as she usually gets a migraine 2-3 days after consuming alcohol    Aura: none     How often do the headaches occur -since starting Aimovig, had a slight increase the first 1 5-2 weeks but since then has only 3 migraines total    What time of the day do the headaches start - anytime  How long do the headaches last - 1 5 hours to all day or until she takes Sumatriptan  Patient feels less severity and less duration since starting Aimovig  Where are they located - frontal, orbits, at time they radiates to the back of her head  What is the intensity of pain -average pain level 6/10, up to 8/10  Describe your usual headache - Pressure, Throbbing, sharp pain  Associated symptoms: nausea, decrease appetite, photophobia, blurred vision, phonophobia, sensitive to smells, Problem with concentration, light-headed or dizzy, stiff or sore neck, prefer to be alone and in a dark room, unable to work    Overall, patient is very pleased with Hilario Cmapbell after just one month of injection  She states she is due today for her next dose  The following portions of the patient's history were reviewed and updated as appropriate: allergies, current medications, past family history, past medical history, past social history, past surgical history and problem list          Objective:    Blood pressure 126/62, pulse 64, height 5' 5" (1 651 m), weight 62 1 kg (137 lb)  Physical Exam   Constitutional: She appears well-developed and well-nourished  HENT:   Head: Normocephalic and atraumatic  Eyes: EOM are normal  Pupils are equal, round, and reactive to light  Neck: Normal range of motion  Cardiovascular: Normal rate  Pulmonary/Chest: Effort normal    Musculoskeletal: Normal range of motion  Neurological: She has normal strength and normal reflexes  Gait and coordination normal    Skin: Skin is warm and dry  Psychiatric: She has a normal mood and affect  Her speech is normal    Nursing note and vitals reviewed        Neurological Exam    Mental Status  The patient is alert and oriented to person, place, time, and situation  Her recent and remote memory are normal  She has no visuospatial neglect  Her speech is normal  Her language is fluent with no aphasia  She has normal attention span and concentration  She has a normal fund of knowledge  Cranial Nerves    CN II: The patient's visual acuity and visual fields are normal   CN III, IV, VI: The patient's pupils are equally round and reactive to light and ocular movements are normal   CN V: The patient has normal facial sensation  CN VII:  The patient has symmetric facial movement  CN VIII:  The patient's hearing is normal   CN IX, X: The patient has symmetric palate movement and normal gag reflex  CN XI: The patient's shoulder shrug strength is normal   CN XII: The patient's tongue is midline without atrophy or fasciculations  Motor  The patient has normal muscle bulk throughout  Her overall muscle tone is normal throughout  Her strength is 5/5 throughout all four extremities  Sensory  The patient's sensation is normal in all four extremities  She has normal cortical sensation    Reflexes  Deep tendon reflexes are 2+ and symmetric in all four extremities with downgoing toes bilaterally  Gait and Coordination  The patient has normal gait and station and normal casual, toe, heel, and tandem gait  She has normal coordination bilaterally  ROS:    Review of Systems   Constitutional: Negative  HENT: Negative  Eyes: Negative  Respiratory: Negative  Cardiovascular: Negative  Gastrointestinal: Negative  Endocrine: Negative  Genitourinary: Negative  Musculoskeletal: Negative  Skin: Negative  Allergic/Immunologic: Negative  Neurological: Positive for headaches  Hematological: Negative  Psychiatric/Behavioral: Negative

## 2018-08-29 ENCOUNTER — OFFICE VISIT (OUTPATIENT)
Dept: NEUROLOGY | Facility: CLINIC | Age: 23
End: 2018-08-29
Payer: COMMERCIAL

## 2018-08-29 VITALS
HEART RATE: 64 BPM | HEIGHT: 65 IN | DIASTOLIC BLOOD PRESSURE: 62 MMHG | SYSTOLIC BLOOD PRESSURE: 126 MMHG | WEIGHT: 137 LBS | BODY MASS INDEX: 22.82 KG/M2

## 2018-08-29 DIAGNOSIS — G43.709 CHRONIC MIGRAINE WITHOUT AURA WITHOUT STATUS MIGRAINOSUS, NOT INTRACTABLE: ICD-10-CM

## 2018-08-29 PROCEDURE — 99214 OFFICE O/P EST MOD 30 MIN: CPT | Performed by: PHYSICIAN ASSISTANT

## 2018-08-29 RX ORDER — DEXAMETHASONE 2 MG/1
TABLET ORAL
Qty: 3 TABLET | Refills: 0 | Status: SHIPPED | OUTPATIENT
Start: 2018-08-29 | End: 2018-11-01 | Stop reason: SDUPTHER

## 2018-08-29 RX ORDER — SUMATRIPTAN 100 MG/1
100 TABLET, FILM COATED ORAL AS NEEDED
Qty: 9 TABLET | Refills: 0 | Status: SHIPPED | OUTPATIENT
Start: 2018-08-29 | End: 2018-11-01 | Stop reason: SDUPTHER

## 2018-08-29 NOTE — PATIENT INSTRUCTIONS
Preventative:  Continue Aimovig 140 every 30 days     Abortive: At onset of migraine take sumatriptan  May repeat in 2 hours if needed  In addition, it may try prochlorperazine to help break your migraine    Would recommend trying this if needed to repeat the 2nd dose or need to take the dexamethasone

## 2018-09-20 ENCOUNTER — DOCUMENTATION (OUTPATIENT)
Dept: NEUROLOGY | Facility: CLINIC | Age: 23
End: 2018-09-20

## 2018-09-21 ENCOUNTER — TELEPHONE (OUTPATIENT)
Dept: INTERNAL MEDICINE CLINIC | Facility: CLINIC | Age: 23
End: 2018-09-21

## 2018-09-21 ENCOUNTER — APPOINTMENT (OUTPATIENT)
Dept: LAB | Age: 23
End: 2018-09-21
Payer: COMMERCIAL

## 2018-09-21 ENCOUNTER — TRANSCRIBE ORDERS (OUTPATIENT)
Dept: ADMINISTRATIVE | Age: 23
End: 2018-09-21

## 2018-09-21 DIAGNOSIS — Z11.59 NEED FOR HEPATITIS B SCREENING TEST: ICD-10-CM

## 2018-09-21 DIAGNOSIS — Z11.59 NEED FOR HEPATITIS B SCREENING TEST: Primary | ICD-10-CM

## 2018-09-21 PROCEDURE — 87517 HEPATITIS B DNA QUANT: CPT

## 2018-09-21 PROCEDURE — 36415 COLL VENOUS BLD VENIPUNCTURE: CPT

## 2018-09-24 ENCOUNTER — TELEPHONE (OUTPATIENT)
Dept: NEUROLOGY | Facility: CLINIC | Age: 23
End: 2018-09-24

## 2018-09-24 LAB
HBV DNA SERPL NAA+PROBE-ACNC: NORMAL IU/ML
HBV DNA SERPL NAA+PROBE-LOG IU: NORMAL LOG10 IU/ML
REF LAB TEST REF RANGE: NORMAL

## 2018-09-24 NOTE — TELEPHONE ENCOUNTER
Pt called stated that she just received a phone call from her insurance that her Laura Barraza was denied  Pt made aware once we receive the denial letter we can proceed with an appeal  Please advise if you'd like to move forward with an appeal  Is there any additional information that we can add to the appeal? Please see the PA scanned into Media for the explanation that was already given

## 2018-10-01 NOTE — TELEPHONE ENCOUNTER
Never received fax   I called and spoke to insurance and denial was for    Lack of documentation:  That it will be prescribed by neurologist  # of baseline migraines a month  That it will not be used in combo with botox  Failure of 3 of the following: betablockers, depakote, venlafaxine, amitriptyline, topiramate

## 2018-10-01 NOTE — TELEPHONE ENCOUNTER
She was never on betablocker, depakote or topiramate    However, she has failed multiple drugs    She has 2 options:  1 try to get Aimovig to pay  2 apply for ajovy

## 2018-10-01 NOTE — TELEPHONE ENCOUNTER
Called and Left a message on pt's answering machine for a call back at home number    Cell number goes straight to voicemail that is not set up so unable to leave a message  When pt calls back need to know if she will use copay card (she can get online) or if she wants to try alternative

## 2018-10-03 NOTE — TELEPHONE ENCOUNTER
Yes this is what she should do  We can always try a similar medication after the free med if needed    Likely 1 of the 4 will be used by her insurance (hopefully)

## 2018-10-03 NOTE — TELEPHONE ENCOUNTER
Called and spoke to pt, she states she is already receiving Aimovig through the bridge program  She is worried that after the first 12 months she will not be able to continue med  She wants to continue Caleb Ling so is asking if we can appeal? I did make her aware that we do not have additional information to provide for appeal  She said she will continue on bridge program and hopefully in a few months her insurance will approve medication?

## 2018-11-01 DIAGNOSIS — G43.709 CHRONIC MIGRAINE WITHOUT AURA WITHOUT STATUS MIGRAINOSUS, NOT INTRACTABLE: ICD-10-CM

## 2018-11-01 RX ORDER — DEXAMETHASONE 2 MG/1
TABLET ORAL
Qty: 3 TABLET | Refills: 0 | Status: SHIPPED | OUTPATIENT
Start: 2018-11-01 | End: 2018-11-12 | Stop reason: SDUPTHER

## 2018-11-01 RX ORDER — SUMATRIPTAN 100 MG/1
100 TABLET, FILM COATED ORAL AS NEEDED
Qty: 9 TABLET | Refills: 0 | Status: SHIPPED | OUTPATIENT
Start: 2018-11-01 | End: 2019-02-21 | Stop reason: SDUPTHER

## 2018-11-06 ENCOUNTER — TELEPHONE (OUTPATIENT)
Dept: OBGYN CLINIC | Facility: CLINIC | Age: 23
End: 2018-11-06

## 2018-11-06 NOTE — TELEPHONE ENCOUNTER
Pt called in regards to starting birth control  Pt was seen 1/2018 and saw gertrude they did discuss birth control but pt did not start it  Pt was told she can call anytime when she felt like she was ready   Pt would like to start the pill please advise rx [facility] [place]

## 2018-11-07 DIAGNOSIS — Z30.011 OCP (ORAL CONTRACEPTIVE PILLS) INITIATION: Primary | ICD-10-CM

## 2018-11-07 RX ORDER — NORETHINDRONE ACETATE AND ETHINYL ESTRADIOL 1MG-20(21)
1 KIT ORAL DAILY
Qty: 90 TABLET | Refills: 0 | Status: SHIPPED | OUTPATIENT
Start: 2018-11-07 | End: 2018-11-28 | Stop reason: HOSPADM

## 2018-11-07 NOTE — TELEPHONE ENCOUNTER
LM for pt that OCP was filled and sent to her pharmacy  Gave directions on when to start, use condoms as back for first pack and for STI preventions  Will refill at her yearly appointment in Jan  If any questions to call back

## 2018-11-07 NOTE — TELEPHONE ENCOUNTER
Rx sent for OCP  Please recommend Sunday start, condoms for back up through 1st pack, continued condoms for STI prevention  Please review ACHES and reasons to call  F/u in about 3 mos for pill check   Ok to do this at her Jan annual and will provide more refills at that time

## 2018-11-12 ENCOUNTER — TELEPHONE (OUTPATIENT)
Dept: NEUROLOGY | Facility: CLINIC | Age: 23
End: 2018-11-12

## 2018-11-12 DIAGNOSIS — G43.709 CHRONIC MIGRAINE WITHOUT AURA WITHOUT STATUS MIGRAINOSUS, NOT INTRACTABLE: ICD-10-CM

## 2018-11-12 DIAGNOSIS — G43.109 MIGRAINE WITH AURA AND WITHOUT STATUS MIGRAINOSUS, NOT INTRACTABLE: Primary | ICD-10-CM

## 2018-11-12 RX ORDER — DIVALPROEX SODIUM 250 MG/1
250 TABLET, EXTENDED RELEASE ORAL
Qty: 8 TABLET | Refills: 0 | Status: SHIPPED | OUTPATIENT
Start: 2018-11-12 | End: 2018-11-22 | Stop reason: HOSPADM

## 2018-11-12 RX ORDER — DEXAMETHASONE 2 MG/1
TABLET ORAL
Qty: 2 TABLET | Refills: 0 | Status: SHIPPED | OUTPATIENT
Start: 2018-11-12 | End: 2018-11-22 | Stop reason: HOSPADM

## 2018-11-12 NOTE — TELEPHONE ENCOUNTER
Pollyann Hamman   I would have her try Emgality before Botox as botox can take 6-9 months to be effective and Emgality can be effective within 3 months  Have her call back if she would like to change to [facility]    Also has coupon card for decreased co-pay

## 2018-11-12 NOTE — TELEPHONE ENCOUNTER
Pt called to report she had a migraine for past 5 days  Has taken imitrex and excedrin  Compazine does not work  Requesting decadron however used it earlier this mo for previous migraine and not due for refill until 12/1  Also feels Lennox Otter not working as she has had migraines 4x wk for past mo  Please advise

## 2018-11-12 NOTE — TELEPHONE ENCOUNTER
Can do 2 days of Decadron only as that would be 10 mg total    Also have her take depakote 250 mg 2 tabs at bedtime for 3 nights and 1 tab for 2 nights    Has she completed 3 months of Aimovig? If not, should try all 3 months  If she has we can try Emgality or Ajovy as slightly different mechanism of action  Please let me know about CGRP    Will send other 2 meds to pharm on file    Can also come in for Toradol and Compazine injections if she wants

## 2018-11-12 NOTE — TELEPHONE ENCOUNTER
Pt has done 3months of Aimovig, seemed better 1st month, but no help since  Last inj 2wks ago    Pt will try depakote/deacdron 1st, if not helpful will call back for toradol/comp inj    Pt asks if botox is an option, states this was previously discussed, if the aimovig not helpful  Next f/u 12/19

## 2018-11-14 NOTE — TELEPHONE ENCOUNTER
pt made aware  she is currently taking decadron and depakote  States that yesterday she had migriane, today did not have migraine yet but feels it coming on   states that she is going to lay down and see how she feels  she would like to try emgality  Please send to pharm on file

## 2018-11-15 NOTE — TELEPHONE ENCOUNTER
[facility] [address]     emgality 120mg 2 subq injections x1 then 1 injection 30 days later and monthly thereafter    qty 6

## 2018-11-19 NOTE — TELEPHONE ENCOUNTER
Pt called in stated that she has finished the depakote and decadron and her headache continues  Please advise

## 2018-11-19 NOTE — TELEPHONE ENCOUNTER
Please call the pharmacy to remind them of the override code for emgality  Patient should be able to receive without the prior auth being completed    For her migraine, can she come in for Toradol and/or compazine today?     If not, we can send in Compazine 10 mg q 8 hr x 3 doses then as needed and cyproheptadine 4mg at bedtime for 14 days then prn    Please let me know

## 2018-11-19 NOTE — TELEPHONE ENCOUNTER
Called and advised pt of all of the below  Agreeable to get toradol and/or compazine today  What time should I tell her? Pt is ok to see other doctor

## 2018-11-19 NOTE — TELEPHONE ENCOUNTER
Emgality requires PA per [facility] pharmacy [facility])  Darlin Bangura [id number]  pcn Catarina Galeazzi [id number]  group [id number]  [phone]    PA initiated via CMM, awaiting determination  Pt made aware  Also aware of emgality saving card            [phone]

## 2018-11-20 ENCOUNTER — CLINICAL SUPPORT (OUTPATIENT)
Dept: NEUROLOGY | Facility: CLINIC | Age: 23
End: 2018-11-20
Payer: COMMERCIAL

## 2018-11-20 ENCOUNTER — HOSPITAL ENCOUNTER (OUTPATIENT)
Facility: HOSPITAL | Age: 23
Setting detail: OBSERVATION
Discharge: HOME | End: 2018-11-22
Attending: EMERGENCY MEDICINE | Admitting: HOSPITALIST
Payer: COMMERCIAL

## 2018-11-20 DIAGNOSIS — G43.919 INTRACTABLE MIGRAINE: Primary | ICD-10-CM

## 2018-11-20 DIAGNOSIS — G43.109 MIGRAINE WITH AURA AND WITHOUT STATUS MIGRAINOSUS, NOT INTRACTABLE: Primary | ICD-10-CM

## 2018-11-20 DIAGNOSIS — E53.8 LOW SERUM VITAMIN B12: Primary | ICD-10-CM

## 2018-11-20 PROBLEM — G43.001 MIGRAINE WITHOUT AURA WITH STATUS MIGRAINOSUS: Status: ACTIVE | Noted: 2018-11-20

## 2018-11-20 PROCEDURE — 96368 THER/DIAG CONCURRENT INF: CPT

## 2018-11-20 PROCEDURE — 99220 PR INITIAL OBSERVATION CARE/DAY 70 MINUTES: CPT | Performed by: PHYSICIAN ASSISTANT

## 2018-11-20 PROCEDURE — 96375 TX/PRO/DX INJ NEW DRUG ADDON: CPT

## 2018-11-20 PROCEDURE — 96366 THER/PROPH/DIAG IV INF ADDON: CPT

## 2018-11-20 PROCEDURE — 96372 THER/PROPH/DIAG INJ SC/IM: CPT

## 2018-11-20 PROCEDURE — 99284 EMERGENCY DEPT VISIT MOD MDM: CPT

## 2018-11-20 PROCEDURE — 96361 HYDRATE IV INFUSION ADD-ON: CPT

## 2018-11-20 PROCEDURE — 96365 THER/PROPH/DIAG IV INF INIT: CPT

## 2018-11-20 RX ORDER — CYANOCOBALAMIN 1000 UG/ML
1000 INJECTION INTRAMUSCULAR; SUBCUTANEOUS
Status: DISCONTINUED | OUTPATIENT
Start: 2018-11-20 | End: 2018-11-21 | Stop reason: CLARIF

## 2018-11-20 RX ORDER — KETOROLAC TROMETHAMINE 30 MG/ML
15 INJECTION, SOLUTION INTRAMUSCULAR; INTRAVENOUS ONCE
Status: COMPLETED | OUTPATIENT
Start: 2018-11-20 | End: 2018-11-20

## 2018-11-20 RX ORDER — DEXAMETHASONE SODIUM PHOSPHATE 4 MG/ML
10 INJECTION, SOLUTION INTRA-ARTICULAR; INTRALESIONAL; INTRAMUSCULAR; INTRAVENOUS; SOFT TISSUE ONCE
Status: COMPLETED | OUTPATIENT
Start: 2018-11-20 | End: 2018-11-20

## 2018-11-20 RX ORDER — KETOROLAC TROMETHAMINE 30 MG/ML
30 INJECTION, SOLUTION INTRAMUSCULAR; INTRAVENOUS EVERY 8 HOURS SCHEDULED
Status: DISCONTINUED | OUTPATIENT
Start: 2018-11-20 | End: 2018-11-21

## 2018-11-20 RX ORDER — DIPHENHYDRAMINE HYDROCHLORIDE 50 MG/ML
25 INJECTION INTRAMUSCULAR; INTRAVENOUS ONCE
Status: COMPLETED | OUTPATIENT
Start: 2018-11-20 | End: 2018-11-20

## 2018-11-20 RX ORDER — METOCLOPRAMIDE HYDROCHLORIDE 5 MG/ML
10 INJECTION INTRAMUSCULAR; INTRAVENOUS EVERY 8 HOURS SCHEDULED
Status: DISCONTINUED | OUTPATIENT
Start: 2018-11-20 | End: 2018-11-21

## 2018-11-20 RX ORDER — ACETAMINOPHEN 325 MG/1
650 TABLET ORAL EVERY 6 HOURS PRN
Status: DISCONTINUED | OUTPATIENT
Start: 2018-11-20 | End: 2018-11-22 | Stop reason: HOSPADM

## 2018-11-20 RX ORDER — KETOROLAC TROMETHAMINE 30 MG/ML
60 INJECTION, SOLUTION INTRAMUSCULAR; INTRAVENOUS ONCE
Status: DISCONTINUED | OUTPATIENT
Start: 2018-11-20 | End: 2018-11-20 | Stop reason: HOSPADM

## 2018-11-20 RX ORDER — METOCLOPRAMIDE HYDROCHLORIDE 5 MG/ML
10 INJECTION INTRAMUSCULAR; INTRAVENOUS ONCE
Status: COMPLETED | OUTPATIENT
Start: 2018-11-20 | End: 2018-11-20

## 2018-11-20 RX ORDER — MAGNESIUM SULFATE HEPTAHYDRATE 40 MG/ML
2 INJECTION, SOLUTION INTRAVENOUS ONCE
Status: COMPLETED | OUTPATIENT
Start: 2018-11-20 | End: 2018-11-20

## 2018-11-20 RX ORDER — DIPHENHYDRAMINE HYDROCHLORIDE 50 MG/ML
25 INJECTION INTRAMUSCULAR; INTRAVENOUS EVERY 8 HOURS SCHEDULED
Status: DISCONTINUED | OUTPATIENT
Start: 2018-11-20 | End: 2018-11-21

## 2018-11-20 RX ORDER — SODIUM CHLORIDE 9 MG/ML
150 INJECTION, SOLUTION INTRAVENOUS CONTINUOUS
Status: DISCONTINUED | OUTPATIENT
Start: 2018-11-20 | End: 2018-11-22

## 2018-11-20 RX ORDER — ONDANSETRON 2 MG/ML
4 INJECTION INTRAMUSCULAR; INTRAVENOUS EVERY 6 HOURS PRN
Status: DISCONTINUED | OUTPATIENT
Start: 2018-11-20 | End: 2018-11-22 | Stop reason: HOSPADM

## 2018-11-20 RX ADMIN — DEXAMETHASONE SODIUM PHOSPHATE 10 MG: 4 INJECTION, SOLUTION INTRAMUSCULAR; INTRAVENOUS at 17:29

## 2018-11-20 RX ADMIN — KETOROLAC TROMETHAMINE 30 MG: 30 INJECTION, SOLUTION INTRAMUSCULAR at 21:51

## 2018-11-20 RX ADMIN — KETOROLAC TROMETHAMINE 60 MG: 30 INJECTION, SOLUTION INTRAMUSCULAR; INTRAVENOUS at 08:59

## 2018-11-20 RX ADMIN — KETOROLAC TROMETHAMINE 15 MG: 30 INJECTION, SOLUTION INTRAMUSCULAR at 17:19

## 2018-11-20 RX ADMIN — MAGNESIUM SULFATE IN WATER 2 G: 40 INJECTION, SOLUTION INTRAVENOUS at 17:33

## 2018-11-20 RX ADMIN — SODIUM CHLORIDE 150 ML/HR: 0.9 INJECTION, SOLUTION INTRAVENOUS at 21:48

## 2018-11-20 RX ADMIN — SODIUM CHLORIDE 1000 ML: 0.9 INJECTION, SOLUTION INTRAVENOUS at 16:49

## 2018-11-20 RX ADMIN — METOCLOPRAMIDE 10 MG: 5 INJECTION, SOLUTION INTRAMUSCULAR; INTRAVENOUS at 17:16

## 2018-11-20 RX ADMIN — DIPHENHYDRAMINE HYDROCHLORIDE 25 MG: 50 INJECTION, SOLUTION INTRAMUSCULAR; INTRAVENOUS at 21:51

## 2018-11-20 RX ADMIN — DIPHENHYDRAMINE HYDROCHLORIDE 25 MG: 50 INJECTION, SOLUTION INTRAMUSCULAR; INTRAVENOUS at 17:18

## 2018-11-20 RX ADMIN — METOCLOPRAMIDE 10 MG: 5 INJECTION, SOLUTION INTRAMUSCULAR; INTRAVENOUS at 21:51

## 2018-11-20 RX ADMIN — VALPROATE SODIUM 1000 MG: 100 INJECTION, SOLUTION INTRAVENOUS at 19:14

## 2018-11-20 NOTE — ED PROVIDER NOTES
History  Chief Complaint   Patient presents with    Migraine     c/o migraine x12 days despite prescription meds from home, IM tramadol in office today and PO steroids given by neuro last week  History provided by:  Patient and medical records   used: No     70-year-old female with history of chronic migraines, seeing Neurology, trying multiple modes of treatment presented for evaluation and treatment of a migraine headache present for about 2 weeks  She states that similar to her typical migraines but longer lasting than ones she has had the past   Pain moderate to severe, throbbing  She seems very uncomfortable on examination  She is not particularly photophobic  She does have nausea but no vomiting  No weakness, paresthesias, visual changes, neck pain or stiffness  She has been in contact with her neurologist who recommended treatment in the ED today  She had been seen in the office earlier today and was given IM Toradol and Compazine  The patient had reported no improvement  She was given Decadron yesterday and has been on Depakote  Was on Amovig the last 3 month  Planning to start Emgality -- waiting on insurance approval   Two weeks ago the patient did start oral contraceptive which she has not been on in the past   This may play a role in this new, intractable headache  Discussed with the patient that we will treat her symptoms but may need to admit for ongoing care  Prior to Admission Medications   Prescriptions Last Dose Informant Patient Reported? Taking?    Erenumab-aooe (AIMOVIG 140 DOSE) 70 MG/ML SOAJ Past Month at Unknown time Self Yes Yes   Sig: Inject under the skin every 30 (thirty) days   SUMAtriptan (IMITREX) 100 mg tablet Past Week at Unknown time  No Yes   Sig: Take 1 tablet (100 mg total) by mouth as needed for migraine Limit 3/week or 9/month   aspirin-acetaminophen-caffeine (EXCEDRIN MIGRAINE) 250-250-65 MG per tablet  Self Yes No   Sig: Take 1 tablet by mouth as needed for headaches   dexamethasone (DECADRON) 1 mg tablet Not Taking at Unknown time Self No No   Sig: Take 1 tab in the morning x 5 days with food   Patient not taking: Reported on 8/29/2018    dexamethasone (DECADRON) 2 mg tablet Not Taking at Unknown time  No No   Sig: Take 1 tablet in the AM as needed for headaches for 3 days PRN   Patient not taking: Reported on 11/20/2018    divalproex sodium (DEPAKOTE ER) 250 mg 24 hr tablet Past Week at Unknown time  No Yes   Sig: Take 1 tablet (250 mg total) by mouth daily at bedtime 2 tablets at bedtime for 3 days then 1 tablet at bedtime for 2 days   norethindrone-ethinyl estradiol (JUNEL FE 1/20) 1-20 MG-MCG per tablet 11/20/2018 at Unknown time  No Yes   Sig: Take 1 tablet by mouth daily      Facility-Administered Medications Last Administration Doses Remaining   cyanocobalamin injection 1,000 mcg None recorded    ketorolac (TORADOL) 60 mg/2 mL IM injection 60 mg 11/20/2018  8:59 AM 0          Past Medical History:   Diagnosis Date    Migraine        Past Surgical History:   Procedure Laterality Date    ANKLE SURGERY         Family History   Problem Relation Age of Onset    Arrhythmia Mother      I have reviewed and agree with the history as documented  Social History   Substance Use Topics    Smoking status: Never Smoker    Smokeless tobacco: Never Used    Alcohol use Yes      Comment: socially        Review of Systems   Constitutional: Negative for activity change, appetite change, fatigue and fever  Eyes: Positive for photophobia  Negative for visual disturbance  Respiratory: Negative for chest tightness and shortness of breath  Cardiovascular: Negative for chest pain  Gastrointestinal: Positive for nausea  Negative for abdominal distention, abdominal pain and vomiting  Musculoskeletal: Negative for back pain, neck pain and neck stiffness  Skin: Negative for color change and pallor  Neurological: Positive for headaches   Negative for dizziness, tremors, syncope, weakness and numbness  Psychiatric/Behavioral: Negative for behavioral problems and confusion  All other systems reviewed and are negative  Physical Exam  Physical Exam   Constitutional: She is oriented to person, place, and time  She appears well-developed and well-nourished  Seems uncomfortable  HENT:   Head: Normocephalic and atraumatic  Eyes: Pupils are equal, round, and reactive to light  EOM are normal    Neck: Normal range of motion  Neck supple  Cardiovascular: Normal rate and regular rhythm  Pulmonary/Chest: Effort normal  No respiratory distress  Musculoskeletal: Normal range of motion  She exhibits no deformity  Neurological: She is alert and oriented to person, place, and time  No cranial nerve deficit or sensory deficit  She exhibits normal muscle tone  Coordination normal    Skin: Skin is warm and dry  Psychiatric: She has a normal mood and affect  Her behavior is normal    Nursing note and vitals reviewed        Vital Signs  ED Triage Vitals   Temperature Pulse Respirations Blood Pressure SpO2   11/20/18 1614 11/20/18 1614 11/20/18 1614 11/20/18 1614 11/20/18 1614   97 5 °F (36 4 °C) 96 18 137/72 100 %      Temp Source Heart Rate Source Patient Position - Orthostatic VS BP Location FiO2 (%)   11/20/18 1614 11/20/18 1614 11/20/18 1614 11/20/18 1614 --   Oral Monitor Sitting Right arm       Pain Score       11/20/18 1625       9           Vitals:    11/20/18 1800 11/20/18 1900 11/20/18 2000 11/20/18 2114   BP: 112/56 107/56 108/61 116/57   Pulse: 72 86 86 82   Patient Position - Orthostatic VS: Lying  Lying Lying       Visual Acuity      ED Medications  Medications   metoclopramide (REGLAN) injection 10 mg (10 mg Intravenous Given 11/20/18 2151)   ketorolac (TORADOL) injection 30 mg (30 mg Intravenous Given 11/20/18 2151)   diphenhydrAMINE (BENADRYL) injection 25 mg (25 mg Intravenous Given 11/20/18 2151)   sodium chloride 0 9 % infusion (150 mL/hr Intravenous New Bag 11/20/18 2148)   acetaminophen (TYLENOL) tablet 650 mg (not administered)   ondansetron (ZOFRAN) injection 4 mg (not administered)   sodium chloride 0 9 % bolus 1,000 mL (0 mL Intravenous Stopped 11/20/18 1749)   metoclopramide (REGLAN) injection 10 mg (10 mg Intravenous Given 11/20/18 1716)   diphenhydrAMINE (BENADRYL) injection 25 mg (25 mg Intravenous Given 11/20/18 1718)   ketorolac (TORADOL) injection 15 mg (15 mg Intravenous Given 11/20/18 1719)   magnesium sulfate 2 g/50 mL IVPB (premix) 2 g (0 g Intravenous Stopped 11/20/18 1933)   dexamethasone (DECADRON) injection 10 mg (10 mg Intravenous Given 11/20/18 1729)   valproate (DEPACON) 1,000 mg in sodium chloride 0 9 % 50 mL for headache (0 g Intravenous Stopped 11/20/18 1934)       Diagnostic Studies  Results Reviewed     None                 No orders to display              Procedures  Procedures       Phone Contacts  ED Phone Contact    ED Course  ED Course as of Nov 20 2247   Tu Nov 20, 2018   [address] Patient had slight improvement in pain from 9 to 7  Will give alternative meds as well for symptomatic care  2007 Patient reports no improvement after Depakote infusion  MDM  Number of Diagnoses or Management Options  Intractable migraine:   Diagnosis management comments: 23-year-old female with a history of migraines presented with intractable migraine over the last 2 weeks despite multiple outpatient medications  Even after migraine cocktail and adjunctive treatments here she only had marginal relief  Discussed with on-call neurologist and admitted for around the clock meds and continued treatment         Amount and/or Complexity of Data Reviewed  Clinical lab tests: ordered and reviewed    Patient Progress  Patient progress: stable    CritCare Time    Disposition  Final diagnoses:   Intractable migraine     Time reflects when diagnosis was documented in both MDM as applicable and the Disposition within this note     Time User Action Codes Description Comment    11/20/2018  8:31 PM Elnoria Nice Add [N02 973] Intractable migraine     11/20/2018  9:03 PM Phoenix Masters Add [G43 001] Migraine without aura with status migrainosus     11/20/2018  9:03 PM Phoenix Masters Modify [G43 001] Migraine without aura with status migrainosus     11/20/2018  9:03 PM Phoenix Masters Remove [G43 001] Migraine without aura with status migrainosus       ED Disposition     ED Disposition Condition Comment    Admit  Case was discussed with Sarah Gómez and the patient's admission status was agreed to be Admission Status: observation status to the service of Dr Elena Tinoco          Follow-up Information    None         Current Discharge Medication List      CONTINUE these medications which have NOT CHANGED    Details   divalproex sodium (DEPAKOTE ER) 250 mg 24 hr tablet Take 1 tablet (250 mg total) by mouth daily at bedtime 2 tablets at bedtime for 3 days then 1 tablet at bedtime for 2 days  Qty: 8 tablet, Refills: 0    Associated Diagnoses: Chronic migraine without aura without status migrainosus, not intractable      Erenumab-aooe (AIMOVIG 140 DOSE) 70 MG/ML SOAJ Inject under the skin every 30 (thirty) days      norethindrone-ethinyl estradiol (JUNEL FE 1/20) 1-20 MG-MCG per tablet Take 1 tablet by mouth daily  Qty: 90 tablet, Refills: 0    Associated Diagnoses: OCP (oral contraceptive pills) initiation      SUMAtriptan (IMITREX) 100 mg tablet Take 1 tablet (100 mg total) by mouth as needed for migraine Limit 3/week or 9/month  Qty: 9 tablet, Refills: 0    Associated Diagnoses: Chronic migraine without aura without status migrainosus, not intractable      aspirin-acetaminophen-caffeine (EXCEDRIN MIGRAINE) 250-250-65 MG per tablet Take 1 tablet by mouth as needed for headaches      !! dexamethasone (DECADRON) 1 mg tablet Take 1 tab in the morning x 5 days with food  Qty: 5 tablet, Refills: 0    Associated Diagnoses: Chronic migraine without aura without status migrainosus, not intractable      !! dexamethasone (DECADRON) 2 mg tablet Take 1 tablet in the AM as needed for headaches for 3 days PRN  Qty: 2 tablet, Refills: 0    Associated Diagnoses: Chronic migraine without aura without status migrainosus, not intractable       !! - Potential duplicate medications found  Please discuss with provider  No discharge procedures on file      ED Provider  Electronically Signed by           Renata Collins MD  11/20/18 3953

## 2018-11-20 NOTE — TELEPHONE ENCOUNTER
Pt called very upset and crying  She states the the injection she had today did not give her any relief  She states she is still in so much pain and is finding it hard to function  I did suggest pt go to the ED however she states that 2 years ago she was in the ED for a migraine and the relief she got was very temporary  Pt insisted I ask you if there is anything else she can try besides going to the ED

## 2018-11-20 NOTE — TELEPHONE ENCOUNTER
FYI: Made pt aware  She states that she will probably go to the [facility] ED  Pt has no further questions at this time  ED transfer entered

## 2018-11-20 NOTE — LETTER
[facility]  [address] [address]  Dept: [phone]    November 22, 2018     Patient: Marilee Roque   YOB: 1995   Date of Visit: 11/20/2018       To Whom it May Concern:    Marilee Roque is under my professional care  She was seen in the hospital from 11/20/2018   to 11/22/18  She may return to work on 11/23  If you have any questions or concerns, please don't hesitate to call           Sincerely,          Jodi Cortez PA-C

## 2018-11-20 NOTE — LETTER
[facility]  [address] [address]  Dept: [phone]    November 22, 2018     Patient: Neville Bowie   YOB: 1995   Date of Visit: 11/20/2018       To Whom it May Concern:    Neville Bowie is under my professional care  She was seen in the hospital from 11/20/2018   to 11/22/18  She may return to work on 11/26  If you have any questions or concerns, please don't hesitate to call           Sincerely,          Paul Swan PA-C

## 2018-11-21 ENCOUNTER — TELEPHONE (OUTPATIENT)
Dept: NEUROLOGY | Facility: CLINIC | Age: 23
End: 2018-11-21

## 2018-11-21 LAB
ANION GAP SERPL CALCULATED.3IONS-SCNC: 12 MMOL/L (ref 4–13)
BUN SERPL-MCNC: 9 MG/DL (ref 5–25)
CALCIUM SERPL-MCNC: 8.5 MG/DL (ref 8.3–10.1)
CHLORIDE SERPL-SCNC: 108 MMOL/L (ref 100–108)
CO2 SERPL-SCNC: 20 MMOL/L (ref 21–32)
CREAT SERPL-MCNC: 0.57 MG/DL (ref 0.6–1.3)
ERYTHROCYTE [DISTWIDTH] IN BLOOD BY AUTOMATED COUNT: 13.2 % (ref 11.6–15.1)
GFR SERPL CREATININE-BSD FRML MDRD: 130 ML/MIN/1.73SQ M
GLUCOSE SERPL-MCNC: 108 MG/DL (ref 65–140)
HCT VFR BLD AUTO: 38.5 % (ref 34.8–46.1)
HGB BLD-MCNC: 12.7 G/DL (ref 11.5–15.4)
MAGNESIUM SERPL-MCNC: 2.1 MG/DL (ref 1.6–2.6)
MCH RBC QN AUTO: 29.5 PG (ref 26.8–34.3)
MCHC RBC AUTO-ENTMCNC: 33 G/DL (ref 31.4–37.4)
MCV RBC AUTO: 89 FL (ref 82–98)
PHOSPHATE SERPL-MCNC: 3 MG/DL (ref 2.7–4.5)
PLATELET # BLD AUTO: 231 THOUSANDS/UL (ref 149–390)
PMV BLD AUTO: 9.3 FL (ref 8.9–12.7)
POTASSIUM SERPL-SCNC: 3.9 MMOL/L (ref 3.5–5.3)
RBC # BLD AUTO: 4.31 MILLION/UL (ref 3.81–5.12)
SODIUM SERPL-SCNC: 140 MMOL/L (ref 136–145)
TSH SERPL DL<=0.05 MIU/L-ACNC: 1.43 UIU/ML (ref 0.36–3.74)
WBC # BLD AUTO: 10.18 THOUSAND/UL (ref 4.31–10.16)

## 2018-11-21 PROCEDURE — 80048 BASIC METABOLIC PNL TOTAL CA: CPT | Performed by: PHYSICIAN ASSISTANT

## 2018-11-21 PROCEDURE — 99225 PR SBSQ OBSERVATION CARE/DAY 25 MINUTES: CPT | Performed by: PHYSICIAN ASSISTANT

## 2018-11-21 PROCEDURE — 99244 OFF/OP CNSLTJ NEW/EST MOD 40: CPT | Performed by: PSYCHIATRY & NEUROLOGY

## 2018-11-21 PROCEDURE — 84443 ASSAY THYROID STIM HORMONE: CPT | Performed by: PHYSICIAN ASSISTANT

## 2018-11-21 PROCEDURE — 85027 COMPLETE CBC AUTOMATED: CPT | Performed by: PHYSICIAN ASSISTANT

## 2018-11-21 PROCEDURE — 83735 ASSAY OF MAGNESIUM: CPT | Performed by: PHYSICIAN ASSISTANT

## 2018-11-21 PROCEDURE — 84100 ASSAY OF PHOSPHORUS: CPT | Performed by: PHYSICIAN ASSISTANT

## 2018-11-21 RX ORDER — DIPHENHYDRAMINE HYDROCHLORIDE 50 MG/ML
25 INJECTION INTRAMUSCULAR; INTRAVENOUS EVERY 6 HOURS SCHEDULED
Status: DISCONTINUED | OUTPATIENT
Start: 2018-11-21 | End: 2018-11-21

## 2018-11-21 RX ORDER — KETOROLAC TROMETHAMINE 30 MG/ML
30 INJECTION, SOLUTION INTRAMUSCULAR; INTRAVENOUS EVERY 6 HOURS SCHEDULED
Status: DISCONTINUED | OUTPATIENT
Start: 2018-11-21 | End: 2018-11-21

## 2018-11-21 RX ORDER — DIHYDROERGOTAMINE MESYLATE 1 MG/ML
0.7 INJECTION, SOLUTION INTRAMUSCULAR; INTRAVENOUS; SUBCUTANEOUS ONCE
Status: DISCONTINUED | OUTPATIENT
Start: 2018-11-22 | End: 2018-11-22

## 2018-11-21 RX ORDER — ONDANSETRON 2 MG/ML
4 INJECTION INTRAMUSCULAR; INTRAVENOUS EVERY 8 HOURS
Status: DISCONTINUED | OUTPATIENT
Start: 2018-11-21 | End: 2018-11-22

## 2018-11-21 RX ORDER — NORETHINDRONE ACETATE AND ETHINYL ESTRADIOL 1MG-20(21)
1 KIT ORAL DAILY
Status: DISCONTINUED | OUTPATIENT
Start: 2018-11-21 | End: 2018-11-22 | Stop reason: HOSPADM

## 2018-11-21 RX ORDER — METOCLOPRAMIDE HYDROCHLORIDE 5 MG/ML
10 INJECTION INTRAMUSCULAR; INTRAVENOUS EVERY 6 HOURS SCHEDULED
Status: DISCONTINUED | OUTPATIENT
Start: 2018-11-21 | End: 2018-11-21

## 2018-11-21 RX ORDER — CYANOCOBALAMIN 1000 UG/ML
1000 INJECTION INTRAMUSCULAR; SUBCUTANEOUS
Status: DISCONTINUED | OUTPATIENT
Start: 2018-11-21 | End: 2018-11-21 | Stop reason: CLARIF

## 2018-11-21 RX ORDER — MAGNESIUM SULFATE HEPTAHYDRATE 40 MG/ML
2 INJECTION, SOLUTION INTRAVENOUS ONCE
Status: COMPLETED | OUTPATIENT
Start: 2018-11-21 | End: 2018-11-21

## 2018-11-21 RX ORDER — DIPHENHYDRAMINE HYDROCHLORIDE 50 MG/ML
25 INJECTION INTRAMUSCULAR; INTRAVENOUS EVERY 6 HOURS PRN
Status: DISCONTINUED | OUTPATIENT
Start: 2018-11-21 | End: 2018-11-22 | Stop reason: HOSPADM

## 2018-11-21 RX ORDER — DIHYDROERGOTAMINE MESYLATE 1 MG/ML
1 INJECTION, SOLUTION INTRAMUSCULAR; INTRAVENOUS; SUBCUTANEOUS ONCE
Status: DISCONTINUED | OUTPATIENT
Start: 2018-11-22 | End: 2018-11-22

## 2018-11-21 RX ORDER — DIHYDROERGOTAMINE MESYLATE 1 MG/ML
0.5 INJECTION, SOLUTION INTRAMUSCULAR; INTRAVENOUS; SUBCUTANEOUS ONCE
Status: COMPLETED | OUTPATIENT
Start: 2018-11-21 | End: 2018-11-21

## 2018-11-21 RX ORDER — KETOROLAC TROMETHAMINE 30 MG/ML
30 INJECTION, SOLUTION INTRAMUSCULAR; INTRAVENOUS EVERY 6 HOURS PRN
Status: DISCONTINUED | OUTPATIENT
Start: 2018-11-21 | End: 2018-11-22 | Stop reason: HOSPADM

## 2018-11-21 RX ORDER — METOCLOPRAMIDE HYDROCHLORIDE 5 MG/ML
10 INJECTION INTRAMUSCULAR; INTRAVENOUS EVERY 6 HOURS PRN
Status: DISCONTINUED | OUTPATIENT
Start: 2018-11-21 | End: 2018-11-22 | Stop reason: HOSPADM

## 2018-11-21 RX ADMIN — ONDANSETRON 4 MG: 2 INJECTION INTRAMUSCULAR; INTRAVENOUS at 18:02

## 2018-11-21 RX ADMIN — SODIUM CHLORIDE 150 ML/HR: 0.9 INJECTION, SOLUTION INTRAVENOUS at 16:46

## 2018-11-21 RX ADMIN — KETOROLAC TROMETHAMINE 30 MG: 30 INJECTION, SOLUTION INTRAMUSCULAR at 05:39

## 2018-11-21 RX ADMIN — VALPROATE SODIUM 1000 MG: 100 INJECTION, SOLUTION INTRAVENOUS at 12:21

## 2018-11-21 RX ADMIN — DIPHENHYDRAMINE HYDROCHLORIDE 25 MG: 50 INJECTION, SOLUTION INTRAMUSCULAR; INTRAVENOUS at 20:09

## 2018-11-21 RX ADMIN — METOCLOPRAMIDE 10 MG: 5 INJECTION, SOLUTION INTRAMUSCULAR; INTRAVENOUS at 11:27

## 2018-11-21 RX ADMIN — DIPHENHYDRAMINE HYDROCHLORIDE 25 MG: 50 INJECTION, SOLUTION INTRAMUSCULAR; INTRAVENOUS at 11:27

## 2018-11-21 RX ADMIN — METOCLOPRAMIDE 10 MG: 5 INJECTION, SOLUTION INTRAMUSCULAR; INTRAVENOUS at 05:39

## 2018-11-21 RX ADMIN — DIHYDROERGOTAMINE MESYLATE 0.5 MG: 1 INJECTION, SOLUTION INTRAMUSCULAR; INTRAVENOUS; SUBCUTANEOUS at 18:23

## 2018-11-21 RX ADMIN — MAGNESIUM SULFATE IN WATER 2 G: 40 INJECTION, SOLUTION INTRAVENOUS at 13:07

## 2018-11-21 RX ADMIN — SODIUM CHLORIDE 150 ML/HR: 0.9 INJECTION, SOLUTION INTRAVENOUS at 05:47

## 2018-11-21 RX ADMIN — METOCLOPRAMIDE 10 MG: 5 INJECTION, SOLUTION INTRAMUSCULAR; INTRAVENOUS at 20:10

## 2018-11-21 RX ADMIN — KETOROLAC TROMETHAMINE 30 MG: 30 INJECTION, SOLUTION INTRAMUSCULAR at 11:27

## 2018-11-21 RX ADMIN — SODIUM CHLORIDE 150 ML/HR: 0.9 INJECTION, SOLUTION INTRAVENOUS at 23:45

## 2018-11-21 RX ADMIN — DIPHENHYDRAMINE HYDROCHLORIDE 25 MG: 50 INJECTION, SOLUTION INTRAMUSCULAR; INTRAVENOUS at 05:39

## 2018-11-21 RX ADMIN — KETOROLAC TROMETHAMINE 30 MG: 30 INJECTION, SOLUTION INTRAMUSCULAR at 20:11

## 2018-11-21 NOTE — PROGRESS NOTES
Progress Note - Manas Block 1995, 24 y o  female MRN: [id number]  Unit/Bed#: -01 Encounter: [id number]  Primary Care Provider: Arlene Song MD   Date and time admitted to hospital: 11/20/2018  4:10 PM    * Intractable migraine   Assessment & Plan    · Patient reports current migraine has lasted for 12 days, typical pattern for her and the only difference in her current migraine if that is that it lasted this long  · She called Dr Yossi Gilbert office with whom she follows with, was told to come to the emergency department  · Had tried Excedrin and Imitrex with no relief  · Patient does not report headache at this time, but this can be typical for her  Her migraines will come on at any time, with any trigger  Typically she does not have headache in the morning  · Continue Toradol, Reglan, Benadryl scheduled every 6 hours  · If patient does not have headache after 1800 dose, transition to p r n  Basis  If no headache overnight will be able to be discharged tomorrow morning  · Neurology consulted  · Per Neurology will try DHE if recurrence of headache with scheduled migraine cocktail  · Zofran as needed for nausea  · Supportive environment dimming lights, reducing noise  VTE Pharmacologic Prophylaxis:   Pharmacologic: Patient is ambulatory  Mechanical VTE Prophylaxis in Place: No    Patient Centered Rounds: I have performed bedside rounds with nursing staff today  Discussions with Specialists or Other Care Team Provider:  Neurology, case management, nursing  Education and Discussions with Family / Patient:  Discussion with patient at bedside, all questions answered    Time Spent for Care: 30 minutes  More than 50% of total time spent on counseling and coordination of care as described above      Current Length of Stay: 0 day(s)    Current Patient Status: Observation   Certification Statement: The patient will continue to require additional inpatient hospital stay due to Monitoring for recurrence of headache overnight    Discharge Plan:  If the patient does not have headache tonight, the patient will be able to be discharged home tomorrow per Neurology  She will need follow-up neurology appointment  Code Status: Level 1 - Full Code      Subjective: Today the patient reports she is feeling well, and does not have a headache at this time  This can be typical for her, as she normally does not have headache in the morning, began have recurrence of headache at a time throughout the day  She has been eating well, good p o  Intake, has been using the bathroom, able to ambulate, not in any pain at this time  Additionally denies fevers, chills, lightheadedness, dizziness, chest pain, shortness of breath, palpitations, cough, wheeze, abdominal pain, nausea, vomiting, change in stool, edema  Objective:     Vitals:   Temp (24hrs), Av 2 °F (36 8 °C), Min:97 5 °F (36 4 °C), Max:98 6 °F (37 °C)    Temp:  [97 5 °F (36 4 °C)-98 6 °F (37 °C)] 98 5 °F (36 9 °C)  HR:  [71-96] 71  Resp:  [16-18] 16  BP: (107-137)/(56-72) 109/56  SpO2:  [97 %-100 %] 98 %  Body mass index is 23 44 kg/m²  Input and Output Summary (last 24 hours): Intake/Output Summary (Last 24 hours) at 18 1125  Last data filed at 18 1934   Gross per 24 hour   Intake             1100 ml   Output                0 ml   Net             1100 ml       Physical Exam:     Physical Exam   Constitutional: She is oriented to person, place, and time  She appears well-developed and well-nourished  No distress  HENT:   Head: Normocephalic and atraumatic  Eyes: Pupils are equal, round, and reactive to light  No scleral icterus  Neck: Neck supple  No JVD present  Cardiovascular: Normal rate, regular rhythm and normal heart sounds  No murmur heard  Pulmonary/Chest: Effort normal and breath sounds normal  No respiratory distress  She has no wheezes  Abdominal: Soft  Bowel sounds are normal  She exhibits no distension  There is no tenderness  Musculoskeletal: She exhibits no edema  Neurological: She is alert and oriented to person, place, and time  No cranial nerve deficit  Skin: Skin is warm and dry  No rash noted  No erythema  Psychiatric: She has a normal mood and affect  Nursing note and vitals reviewed  Additional Data:     Labs:      Results from last 7 days  Lab Units 11/21/18  0548   WBC Thousand/uL 10 18*   HEMOGLOBIN g/dL 12 7   HEMATOCRIT % 38 5   PLATELETS Thousands/uL 231       Results from last 7 days  Lab Units 11/21/18  0548   SODIUM mmol/L 140   POTASSIUM mmol/L 3 9   CHLORIDE mmol/L 108   CO2 mmol/L 20*   BUN mg/dL 9   CREATININE mg/dL 0 57*   ANION GAP mmol/L 12   CALCIUM mg/dL 8 5   GLUCOSE RANDOM mg/dL 108                           * I Have Reviewed All Lab Data Listed Above  * Additional Pertinent Lab Tests Reviewed: All Labs Within Last 24 Hours Reviewed    Imaging:    Imaging Reports Reviewed Today Include: none  Imaging Personally Reviewed by Myself Includes:  none    Recent Cultures (last 7 days):           Last 24 Hours Medication List:     Current Facility-Administered Medications:  acetaminophen 650 mg Oral Q6H PRN Brittany Nidhi, PA-C    diphenhydrAMINE 25 mg Intravenous Q6H [address] Brittany Nidhi, PA-C    ketorolac 30 mg Intravenous Q6H [address] Brittany Nidhi, PA-C    metoclopramide 10 mg Intravenous Q6H [address] Brittany Nidhi, PA-C    norethindrone-ethinyl estradiol 1 tablet Oral Daily Brittany Nidhi, PA-C    ondansetron 4 mg Intravenous Q6H PRN Brittany Nidhi, PA-C    sodium chloride 150 mL/hr Intravenous Continuous Brittany Nidhi, PA-C Last Rate: 150 mL/hr (11/21/18 0547)        Today, Patient Was Seen By: Rosa Diaz PA-C    ** Please Note: Dictation voice to text software may have been used in the creation of this document   **

## 2018-11-21 NOTE — ED NOTES
SLIM at bedside   Asked to have 5 more minutes to evaluate pt before bringing pt upstairs      Aflredo Mohan  11/20/18 9136

## 2018-11-21 NOTE — ASSESSMENT & PLAN NOTE
· Patient reports current migraine has lasted for 12 days, typical pattern for her and the only difference in her current migraine if that is that it lasted this long  · She called Dr Akiko Pena office with whom she follows with, was told to come to the emergency department  · Had tried Excedrin and Imitrex with no relief  · Patient does not report headache at this time, but this can be typical for her  Her migraines will come on at any time, with any trigger  Typically she does not have headache in the morning  · Continue Toradol, Reglan, Benadryl scheduled every 6 hours  · If patient does not have headache after 1800 dose, transition to p r n  Basis  If no headache overnight will be able to be discharged tomorrow morning  · Neurology consulted  · Per Neurology will try DHE if recurrence of headache with scheduled migraine cocktail  · Zofran as needed for nausea  · Supportive environment dimming lights, reducing noise

## 2018-11-21 NOTE — PLAN OF CARE
METABOLIC, FLUID AND ELECTROLYTES - ADULT     Electrolytes maintained within normal limits Progressing     Fluid balance maintained Progressing        NEUROSENSORY - ADULT     Achieves stable or improved neurological status Progressing     Absence of seizures Progressing     Achieves maximal functionality and self care Progressing

## 2018-11-21 NOTE — CONSULTS
Consultation - Neurology   Gasper Bernabe 24 y o  female MRN: [id number]  Unit/Bed#: -01 Encounter: [id number]      Assessment/Plan   Assessment:  Gasper Bernabe is a 24 y o  female with past medical history of of chronic migraines who presents with intractable migraine times 12 days  Patient has tried multiple medications for acute migraine as outpatient as below, without relief  Patient started on migraine cocktail upon admission as below and currently with 1/10 headache  Plan:  -Continue migraine cocktail:   -Depacon 1 g IV daily x3 days administered over 20 min   -Toradol 30 mg IV q6h for 72h   -Reglan 10 mg IV q6h for 72h   -Magnesium 1 g IV daily x3 days   -If headache increases despite therapy above, will initiate DHE  -Start Emgality upon dischargeor sooner if family brings in  -Continue OCP  -Continue gentle fluids  -Continue supportive care per primary team  -Continue to monitor notify changes  -Follow-up as outpatient with headache clinic 2-3 weeks  Appointment requested  History of Present Illness     Reason for Consult / Principal Problem: Intractable Migraine    HPI: Gasper Bernabe is a 24 y o  female with past medical history of chronic migraines who presents with intractable migraine x12 days  [facility] follows with [facility] as outpatient for headache for the past two years  Recent medications patient has tried for acute migraine include depakote, decadron, toradol, and reglan  She has also recently tried tried Aimovig x3 months without relief  She uses Immitrex and Excedrin as abortive therapy, which has not been working  Patient states she has had a migraine for the past 12 days, varying in location, but throbbing in nature  Severity usually 8-9/10  Migraine can decrease to 1/10 with with sleep, but comes back on throughout the day at varying times  She reports mild associated with photophobia, but denies N/V, weakness/numbness/tingling   Patient reports increased migraines over the past few months and specially worse over the last few weeks  Patient reports migraines occurring a few days after the start of her period along with a few other migraines throughout the month  This migraine started with her period, but has persisted longer than normal   Patient has failed previous daily therapy including magnesium, B2, amitriptyline, venlafaxine, zonogram, and Zoloft  Previous abortive therapy failed includes Excedrin, Advil, olanzapine, Compazine  Patient recently started OCP approximately 1-2 weeks ago  Per chart review, patient going to be tried on Emgality, which is waiting at the pharmacy for patient to be picked up  Today, patient is seen resting in bed  She currently has a 1/10 headache, which is typical for her in the morning  She is tearful and frustrated with the persistence of current migraine  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  She is worried her migraine will increase as the day goes on  Denies CP, SOB, dizziness, vision changes, N/V, abdominal pain, weakness or numbness  Inpatient consult to Neurology  Consult performed by: Steven Carson  Consult ordered by: Shahida Gamez          Review of Systems  12 point ROS performed, as stated above, all others negative  Historical Information   Past Medical History:   Diagnosis Date    Migraine      Past Surgical History:   Procedure Laterality Date    ANKLE SURGERY       Social History   History   Alcohol Use    Yes     Comment: socially     History   Drug Use No     History   Smoking Status    Never Smoker   Smokeless Tobacco    Never Used     Family History:   Family History   Problem Relation Age of Onset    Arrhythmia Mother        Review of previous medical records was completed      Meds/Allergies   all current active meds have been reviewed and current meds:   Current Facility-Administered Medications   Medication Dose Route Frequency    acetaminophen (TYLENOL) tablet 650 mg  650 mg Oral Q6H PRN    diphenhydrAMINE (BENADRYL) injection 25 mg  25 mg Intravenous Q8H [address]    ketorolac (TORADOL) injection 30 mg  30 mg Intravenous Q8H [address]    metoclopramide (REGLAN) injection 10 mg  10 mg Intravenous Q8H [address]    norethindrone-ethinyl estradiol (JUNEL FE 1/20) 1-20 MG-MCG per tablet 1 tablet  1 tablet Oral Daily    ondansetron (ZOFRAN) injection 4 mg  4 mg Intravenous Q6H PRN    sodium chloride 0 9 % infusion  150 mL/hr Intravenous Continuous       Allergies   Allergen Reactions    Lactose Intolerance (Gi)        Objective   Vitals:Blood pressure 109/56, pulse 71, temperature 98 5 °F (36 9 °C), temperature source Oral, resp  rate 16, height 5' 5" (1 651 m), weight 63 9 kg (140 lb 14 oz), SpO2 98 %  ,Body mass index is 23 44 kg/m²  Intake/Output Summary (Last 24 hours) at 11/21/18 0958  Last data filed at 11/20/18 1934   Gross per 24 hour   Intake             1100 ml   Output                0 ml   Net             1100 ml       Invasive Devices: Invasive Devices     Peripheral Intravenous Line            Peripheral IV 11/20/18 Left Antecubital less than 1 day                Physical Exam   Constitutional: She is oriented to person, place, and time  HENT:   Head: Normocephalic and atraumatic  Eyes: Pupils are equal, round, and reactive to light  EOM are normal    Neck: Normal range of motion  Neck supple  No TTP   Cardiovascular: Normal rate and regular rhythm  Pulmonary/Chest: Effort normal and breath sounds normal    Neurological: She is oriented to person, place, and time  She has normal strength  She has a normal Finger-Nose-Finger Test    Reflex Scores:       Tricep reflexes are 2+ on the right side and 2+ on the left side  Bicep reflexes are 2+ on the right side and 2+ on the left side  Brachioradialis reflexes are 2+ on the right side and 2+ on the left side         Patellar reflexes are 3+ on the right side and 3+ on the left side  Achilles reflexes are 2+ on the right side and 2+ on the left side  Skin: Skin is warm and dry  Psychiatric: She has a normal mood and affect  Her speech is normal and behavior is normal  Judgment and thought content normal    Tearful/frustrated at times     Neurologic Exam     Mental Status   Oriented to person, place, and time  Attention: normal  Concentration: normal    Speech: speech is normal   Level of consciousness: alert    Cranial Nerves     CN III, IV, VI   Pupils are equal, round, and reactive to light  Extraocular motions are normal      CN V   Facial sensation intact  CN VII   Facial expression full, symmetric  CN VIII   CN VIII normal      CN IX, X   CN IX normal    CN X normal      CN XI   CN XI normal      CN XII   CN XII normal      Motor Exam   Right arm pronator drift: absent  Left arm pronator drift: absent    Strength   Strength 5/5 throughout  Sensory Exam   Light touch normal      Gait, Coordination, and Reflexes     Coordination   Finger to nose coordination: normal    Reflexes   Right brachioradialis: 2+  Left brachioradialis: 2+  Right biceps: 2+  Left biceps: 2+  Right triceps: 2+  Left triceps: 2+  Right patellar: 3+  Left patellar: 3+  Right achilles: 2+  Left achilles: 2+  Right plantar: normal  Left plantar: normal      Lab Results: I have personally reviewed pertinent reports       Recent Results (from the past 24 hour(s))   TSH, 3rd generation    Collection Time: 11/21/18  5:48 AM   Result Value Ref Range    TSH 3RD GENERATON 1 434 0 358 - 3 740 uIU/mL   Basic metabolic panel    Collection Time: 11/21/18  5:48 AM   Result Value Ref Range    Sodium 140 136 - 145 mmol/L    Potassium 3 9 3 5 - 5 3 mmol/L    Chloride 108 100 - 108 mmol/L    CO2 20 (L) 21 - 32 mmol/L    ANION GAP 12 4 - 13 mmol/L    BUN 9 5 - 25 mg/dL    Creatinine 0 57 (L) 0 60 - 1 30 mg/dL    Glucose 108 65 - 140 mg/dL    Calcium 8 5 8 3 - 10 1 mg/dL    eGFR 130 ml/min/1 73sq m Magnesium    Collection Time: 11/21/18  5:48 AM   Result Value Ref Range    Magnesium 2 1 1 6 - 2 6 mg/dL   Phosphorus    Collection Time: 11/21/18  5:48 AM   Result Value Ref Range    Phosphorus 3 0 2 7 - 4 5 mg/dL   CBC (With Platelets)    Collection Time: 11/21/18  5:48 AM   Result Value Ref Range    WBC 10 18 (H) 4 31 - 10 16 Thousand/uL    RBC 4 31 3 81 - 5 12 Million/uL    Hemoglobin 12 7 11 5 - 15 4 g/dL    Hematocrit 38 5 34 8 - 46 1 %    MCV 89 82 - 98 fL    MCH 29 5 26 8 - 34 3 pg    MCHC 33 0 31 4 - 37 4 g/dL    RDW 13 2 11 6 - 15 1 %    Platelets 564 703 - 371 Thousands/uL    MPV 9 3 8 9 - 12 7 fL     Imaging Studies: I have personally reviewed pertinent reports  and I have personally reviewed pertinent films in PACS  EKG, Pathology, and Other Studies: I have personally reviewed pertinent reports      VTE Prophylaxis: Sequential compression device (Venodyne)

## 2018-11-21 NOTE — ASSESSMENT & PLAN NOTE
· Admitted with intractable migraine  · Better with migraine cocktail and DHE  · Neurology consult appreciated  · Will discharge home on slow steroid taper starting at 50mg and tapering every 3 days  · Has outpatient follow up scheduled with Neurology in next few weeks

## 2018-11-21 NOTE — ASSESSMENT & PLAN NOTE
· Patient reports current migraine has lasted for 12 days, typical pattern for her and the only difference in her current migraine if that is that it lasted this long  · She called Dr Davon Khan office with whom she follows with, was told to come to the emergency department  · Has tried Excedrin and Imitrex with no relief  · Will start Toradol, Reglan, Benadryl scheduled every 8 hours  · Neurology consulted  · Per Neurology may try DHE if no relief with scheduled migraine cocktail over night and monitor  · Will order basic laboratory studies, CBC, BMP, Mag, phos, TSH  · Zofran as needed for nausea  · Supportive environment dimming lights, reducing noise

## 2018-11-21 NOTE — UTILIZATION REVIEW
[facility]  Utilization Review Department  Phone: [phone]; Fax [phone]  [email or web address]  ATTENTION: Please call with any questions or concerns to [phone]  and carefully listen to the prompts so that you are directed to the right person  Send all requests for admission clinical reviews, approved or denied determinations and any other requests to fax [phone]  All voicemails are confidential   Initial Clinical Review    Admission: Date/Time/Statement: OBSERVATION ADMISSION 11/20/18 2032    Orders Placed This Encounter   Procedures    Place in Observation (expected length of stay for this patient is less than two midnights)     Standing Status:   Standing     Number of Occurrences:   1     Order Specific Question:   Admitting Physician     Answer:   Jermaine Flores [[id number]]     Order Specific Question:   Level of Care     Answer:   Med Surg [16]         ED: Date/Time/Mode of Arrival:   ED Arrival Information     Expected Arrival Acuity Means of Arrival Escorted By Service Admission Type    11/20/2018 11/20/2018 15:55 Urgent Walk-In Family Member Hospitalist Urgent    Arrival Complaint    Migraine with aura and without status migrainosus, not intractable          Chief Complaint:   Chief Complaint   Patient presents with    Migraine     c/o migraine x12 days despite prescription meds from home, IM tramadol in office today and PO steroids given by neuro last week  History of Illness: 27-year-old female with history of chronic migraines, presents on advice of Neurologist, trying multiple modes of treatment presented for evaluation and treatment of a migraine headache present for about 2 weeks  She states that similar to her typical migraines but longer lasting than ones she has had the past   Pain moderate to severe, throbbing  She seems very uncomfortable on examination  She is not particularly photophobic    She does have nausea but no vomiting  She had been seen in the office earlier today and was given IM Toradol and Compazine  The patient had reported no improvement  She was given Decadron yesterday and has been on Depakote  Was on Amovig the last 3 month   Planning to start [facility] -- waiting on insurance approval       ED Vital Signs:   ED Triage Vitals   Temperature Pulse Respirations Blood Pressure SpO2   11/20/18 1614 11/20/18 1614 11/20/18 1614 11/20/18 1614 11/20/18 1614   97 5 °F (36 4 °C) 96 18 137/72 100 %      Temp Source Heart Rate Source Patient Position - Orthostatic VS BP Location FiO2 (%)   11/20/18 1614 11/20/18 1614 11/20/18 1614 11/20/18 1614 --   Oral Monitor Sitting Right arm       Pain Score       11/20/18 1625       9        Wt Readings from Last 1 Encounters:   11/20/18 63 9 kg (140 lb 14 oz)       Vital Signs (abnormal): none    Abnormal Labs/Diagnostic Test Results: 11/21/2018 CO2 20, creatinine 0 57, wbc 10 18,     ED Treatment:   Medication Administration from 11/20/2018 1532 to 11/20/2018 2113       Date/Time Order Dose Route Action Comments     11/20/2018 1749 sodium chloride 0 9 % bolus 1,000 mL 0 mL Intravenous Stopped      11/20/2018 1649 sodium chloride 0 9 % bolus 1,000 mL 1,000 mL Intravenous New Bag      11/20/2018 1716 metoclopramide (REGLAN) injection 10 mg 10 mg Intravenous Given      11/20/2018 1718 diphenhydrAMINE (BENADRYL) injection 25 mg 25 mg Intravenous Given      11/20/2018 1719 ketorolac (TORADOL) injection 15 mg 15 mg Intravenous Given      11/20/2018 1933 magnesium sulfate 2 g/50 mL IVPB (premix) 2 g 0 g Intravenous Stopped      11/20/2018 1733 magnesium sulfate 2 g/50 mL IVPB (premix) 2 g 2 g Intravenous New Bag      11/20/2018 1729 dexamethasone (DECADRON) injection 10 mg 10 mg Intravenous Given      11/20/2018 1934 valproate (DEPACON) 1,000 mg in sodium chloride 0 9 % 50 mL for headache 0 g Intravenous Stopped      11/20/2018 1914 valproate (DEPACON) 1,000 mg in sodium chloride 0 9 % 50 mL for headache 1,000 mg Intravenous New Bag           Past Medical/Surgical History: Active Ambulatory Problems     Diagnosis Date Noted    Chronic migraine without aura without status migrainosus, not intractable 03/09/2018       Past Medical History:   Diagnosis Date    Migraine        Admitting Diagnosis: Migraine [G43 909]  Intractable migraine [G43 919]    Age/Sex: 24 y o  female    Assessment/Plan: 39-year-old female with history of chronic migraines, presents on advice of Neurologist, trying multiple modes of treatment presented for evaluation and treatment of a migraine headache present for about 2 weeks  She states that similar to her typical migraines but longer lasting than ones she has had the past   Pain moderate to severe, throbbing  She seems very uncomfortable on examination  Current migraine lasted for 12 days typical pattern but this episode has persisted  Will start Toradol, Reglan, Benadryl Q 8 hr; Neurology input appreciated, if no relief from migraine cocktail-Neurology may try DHE  CBC, BMP, MAG,PHOS, TSH  Zofran as needed  Supportive care           Admission Orders:  Scheduled Meds:   Current Facility-Administered Medications:  acetaminophen 650 mg Oral Q6H PRN    diphenhydrAMINE 25 mg Intravenous Q8H [address]    ketorolac 30 mg Intravenous Q8H [address]    metoclopramide 10 mg Intravenous Q8H [address]    norethindrone-ethinyl estradiol 1 tablet Oral Daily    ondansetron 4 mg Intravenous Q6H PRN    sodium chloride 150 mL/hr Intravenous Continuous Last Rate: 150 mL/hr (11/21/18 0547)     Continuous Infusions:   sodium chloride 150 mL/hr Last Rate: 150 mL/hr (11/21/18 0547)     PRN Meds:   Peripheral IV  Inpatient to Neurology  Regular diet  Ambulate pt

## 2018-11-21 NOTE — PROGRESS NOTES
Procedures     Pt tolerated injection injections without any issues  However pt was still in pain for migraine

## 2018-11-21 NOTE — H&P
H&P- Marcelo Soler 1995, 24 y o  female MRN: [id number]  Unit/Bed#: -01 Encounter: [id number]  Primary Care Provider: Geoffrey Chandra MD   Date and time admitted to hospital: 11/20/2018  4:10 PM    * Intractable migraine   Assessment & Plan    · Patient reports current migraine has lasted for 12 days, typical pattern for her and the only difference in her current migraine if that is that it lasted this long  · She called Dr Emily Lamb office with whom she follows with, was told to come to the emergency department  · Has tried Excedrin and Imitrex with no relief  · Will start Toradol, Reglan, Benadryl scheduled every 8 hours  · Neurology consulted  · Per Neurology may try DHE if no relief with scheduled migraine cocktail over night and monitor  · Will order basic laboratory studies, CBC, BMP, Mag, phos, TSH  · Zofran as needed for nausea  · Supportive environment dimming lights, reducing noise  VTE Prophylaxis: Patient is ambulatory  / reason for no mechanical VTE prophylaxis Ambulation   Code Status: full  POLST: POLST form is not discussed and not completed at this time  Discussion with family:  Discussion with patient and boyfriend at bedside, all questions answered  Anticipated Length of Stay:  Patient will be admitted on an Observation basis with an anticipated length of stay of  less than 2 midnights  Justification for Hospital Stay:  Intractable migraine    Total Time for Visit, including Counseling / Coordination of Care: 45 minutes  Greater than 50% of this total time spent on direct patient counseling and coordination of care  Chief Complaint:   Intractable migraine    History of Present Illness:    Marcelo Soler is a 24 y o  female who presents with reports of intractable migraine for the last 12 days  She reports on her neurology office today, and was told to come into the emergency department after no relief was obtained with injection of Toradol and compazine    She reports that she takes Excedrin and Imitrex, as prophylactic medications  She has been taking them as often as she can with the relief  Additionally she takes Amovig every month, and is waiting to begin [employer] [place] approval   She reports it is similar to her typical migraine, she does not have photophobia or phonophobia  She has occasional nausea but no vomiting  Does not report any pain in her neck  Initial therapy in the emergency department included migraine cocktail, valproate, Decadron, Mag sulfate  No relief obtained, and Neurology will be consulted at this time  Patient admitted to [facility] floor for management and around the clock pain management  Review of Systems:    Review of Systems   Constitutional: Negative for activity change, appetite change, chills, fatigue and fever  HENT: Negative for congestion, rhinorrhea, sinus pressure and sore throat  Eyes: Negative for photophobia, pain and visual disturbance  Respiratory: Negative for cough, shortness of breath and wheezing  Cardiovascular: Negative for chest pain, palpitations and leg swelling  Gastrointestinal: Negative for abdominal distention, abdominal pain, constipation, diarrhea, nausea and vomiting  Endocrine: Negative for cold intolerance, heat intolerance, polydipsia and polyuria  Genitourinary: Negative for difficulty urinating, dysuria, flank pain, frequency and hematuria  Musculoskeletal: Negative for arthralgias, back pain and joint swelling  Skin: Negative for color change, pallor and rash  Allergic/Immunologic: Negative  Neurological: Positive for headaches  Negative for dizziness, seizures, syncope, speech difficulty, weakness, light-headedness and numbness  Hematological: Negative  Psychiatric/Behavioral: Negative          Past Medical and Surgical History:     Past Medical History:   Diagnosis Date    Migraine        Past Surgical History:   Procedure Laterality Date    ANKLE SURGERY Meds/Allergies:    Prior to Admission medications    Medication Sig Start Date End Date Taking? Authorizing Provider   divalproex sodium (DEPAKOTE ER) 250 mg 24 hr tablet Take 1 tablet (250 mg total) by mouth daily at bedtime 2 tablets at bedtime for 3 days then 1 tablet at bedtime for 2 days 11/12/18  Yes Kam Higgins PA-C   Erenumab-aooe (AIMOVIG 140 DOSE) 70 MG/ML SOAJ Inject under the skin every 30 (thirty) days   Yes Historical Provider, MD   norethindrone-ethinyl estradiol (JUNEL FE 1/20) 1-20 MG-MCG per tablet Take 1 tablet by mouth daily 11/7/18  Yes JEN Freire   SUMAtriptan (IMITREX) 100 mg tablet Take 1 tablet (100 mg total) by mouth as needed for migraine Limit 3/week or 9/month 11/1/18  Yes Kam Higgins PA-C   aspirin-acetaminophen-caffeine (EXCEDRIN MIGRAINE) 853-836-69 MG per tablet Take 1 tablet by mouth as needed for headaches    Historical Provider, MD   dexamethasone (DECADRON) 1 mg tablet Take 1 tab in the morning x 5 days with food  Patient not taking: Reported on 8/29/2018  3/9/18   Kam Higgins PA-C   dexamethasone (DECADRON) 2 mg tablet Take 1 tablet in the AM as needed for headaches for 3 days PRN  Patient not taking: Reported on 11/20/2018 11/12/18   Kam Higgins PA-C     I have reviewed home medications with patient personally  Allergies:    Allergies   Allergen Reactions    Lactose Intolerance (Gi)        Social History:     Marital Status: /Civil Union   Occupation:  Student  Patient Pre-hospital Living Situation: home  Patient Pre-hospital Level of Mobility: full  Patient Pre-hospital Diet Restrictions: none  Substance Use History:   History   Alcohol Use    Yes     Comment: socially     History   Smoking Status    Never Smoker   Smokeless Tobacco    Never Used     History   Drug Use No       Family History:    Family History   Problem Relation Age of Onset    Arrhythmia Mother        Physical Exam:     Vitals:   Blood Pressure: 116/57 (11/20/18 2114)  Pulse: 82 (11/20/18 2114)  Temperature: 98 6 °F (37 °C) (11/20/18 2114)  Temp Source: Oral (11/20/18 2114)  Respirations: 18 (11/20/18 2114)  Height: 5' 5" (165 1 cm) (11/20/18 2114)  Weight - Scale: 63 9 kg (140 lb 14 oz) (11/20/18 2114)  SpO2: 98 % (11/20/18 2114)    Physical Exam   Constitutional: She is oriented to person, place, and time  She appears well-developed and well-nourished  No distress  HENT:   Head: Normocephalic and atraumatic  Mouth/Throat: Oropharynx is clear and moist    Eyes: Pupils are equal, round, and reactive to light  No scleral icterus  Neck: Neck supple  No JVD present  No thyromegaly present  Cardiovascular: Normal rate, regular rhythm and normal heart sounds  No murmur heard  Pulmonary/Chest: Effort normal and breath sounds normal  No respiratory distress  She has no wheezes  Abdominal: Soft  Bowel sounds are normal  She exhibits no distension  There is no tenderness  Musculoskeletal: She exhibits no edema  Neurological: She is alert and oriented to person, place, and time  No cranial nerve deficit  Skin: Skin is warm and dry  No erythema  Psychiatric: She has a normal mood and affect  Her behavior is normal    Nursing note and vitals reviewed  Additional Data:     Lab Results: No laboratory studies completed  Imaging: No imaging studies completed  No orders to display       EKG, Pathology, and Other Studies Reviewed on Admission:   · none    Allscripts / Epic Records Reviewed: Yes     ** Please Note: This note has been constructed using a voice recognition system   **

## 2018-11-22 VITALS
HEIGHT: 65 IN | RESPIRATION RATE: 16 BRPM | OXYGEN SATURATION: 98 % | SYSTOLIC BLOOD PRESSURE: 111 MMHG | WEIGHT: 140.87 LBS | TEMPERATURE: 97.4 F | BODY MASS INDEX: 23.47 KG/M2 | DIASTOLIC BLOOD PRESSURE: 64 MMHG | HEART RATE: 62 BPM

## 2018-11-22 PROCEDURE — 99217 PR OBSERVATION CARE DISCHARGE MANAGEMENT: CPT | Performed by: PHYSICIAN ASSISTANT

## 2018-11-22 RX ORDER — PREDNISONE 20 MG/1
TABLET ORAL
Qty: 20 TABLET | Refills: 0 | Status: SHIPPED | OUTPATIENT
Start: 2018-11-22 | End: 2019-01-03 | Stop reason: ALTCHOICE

## 2018-11-22 RX ADMIN — PREDNISONE 50 MG: 20 TABLET ORAL at 09:57

## 2018-11-22 NOTE — DISCHARGE SUMMARY
Discharge- Sarah Hernandez 1995, 24 y o  female MRN: [id number]    Unit/Bed#: -01 Encounter: [id number]    Primary Care Provider: Guillermina Patton MD   Date and time admitted to hospital: 11/20/2018  4:10 PM        * Intractable migraine   Assessment & Plan    · Admitted with intractable migraine  · Better with migraine cocktail and DHE  · Neurology consult appreciated  · Will discharge home on slow steroid taper starting at 50mg and tapering every 3 days  · Has outpatient follow up scheduled with Neurology in next few weeks         Discharging Physician / Practitioner: Deandre Livingston PA-C  PCP: Guillermina Patton MD  Admission Date:   Admission Orders     Ordered        11/20/18 2032  Place in Observation (expected length of stay for this patient is less than two midnights)  Once             Discharge Date: 11/22/18    Resolved Problems  Date Reviewed: 11/22/2018    None          Consultations During Hospital Stay:  · Dr Yahir Degroot    Procedures Performed:     · None    Significant Findings / Test Results:     · See above    Incidental Findings:   · None     Test Results Pending at Discharge (will require follow up): · None     Outpatient Tests Requested:  · none    Complications:  none    Reason for Admission: Intractable migraine     Hospital Course:     Sarah Hernandez is a 24 y o  female patient who originally presented to the hospital on 11/20/2018 due to intractable migraine lasting at least 12 days  Patient was started on migraine cocktail and DHE with improvement of her symptoms  She will be discharged home on a steroid taper and will have outpatient follow up with Dr Sharif [facility]  Please see above list of diagnoses and related plan for additional information       Condition at Discharge: good     Discharge Day Visit / Exam:     Subjective:  Headache improving  Vitals: Blood Pressure: 111/64 (11/22/18 0847)  Pulse: 62 (11/22/18 0847)  Temperature: (!) 97 4 °F (36 3 °C) (11/22/18 0847)  Temp Source: Oral (11/21/18 2200)  Respirations: 16 (11/22/18 0847)  Height: 5' 5" (165 1 cm) (11/20/18 2114)  Weight - Scale: 63 9 kg (140 lb 14 oz) (11/20/18 2114)  SpO2: 98 % (11/22/18 0847)  Exam:   Physical Exam   Constitutional: She is oriented to person, place, and time  She appears well-developed and well-nourished  No distress  HENT:   Head: Normocephalic and atraumatic  Cardiovascular: Normal rate and regular rhythm  Exam reveals no friction rub  No murmur heard  Pulmonary/Chest: Effort normal and breath sounds normal  No respiratory distress  She has no wheezes  Abdominal: Soft  Bowel sounds are normal  She exhibits no distension  There is no tenderness  There is no rebound and no guarding  Musculoskeletal: She exhibits no edema  Neurological: She is alert and oriented to person, place, and time  No cranial nerve deficit  Skin: Skin is warm and dry  No rash noted  Psychiatric: She has a normal mood and affect  Nursing note and vitals reviewed  Discussion with Family: Friend at bedside    Discharge instructions/Information to patient and family:   See after visit summary for information provided to patient and family  Provisions for Follow-Up Care:  See after visit summary for information related to follow-up care and any pertinent home health orders  Disposition:     Home    For Discharges to [facility]:   · Not Applicable to this Patient - Not Applicable to this Patient    Planned Readmission: none     Discharge Statement:  I spent 35 minutes discharging the patient  This time was spent on the day of discharge  I had direct contact with the patient on the day of discharge  Greater than 50% of the total time was spent examining patient, answering all patient questions, arranging and discussing plan of care with patient as well as directly providing post-discharge instructions  Additional time then spent on discharge activities      Discharge Medications:  See after visit summary for reconciled discharge medications provided to patient and family        ** Please Note: This note has been constructed using a voice recognition system **

## 2018-11-22 NOTE — UTILIZATION REVIEW
Continued Stay Review    Date: 2018 - developed headache in the evening - bilateral temporal region, DHE staretd  Vital Signs: Temp (24hrs), Av 2 °F (36 8 °C), Min:97 5 °F (36 4 °C), Max:98 6 °F (37 °C)     Temp:  [97 5 °F (36 4 °C)-98 6 °F (37 °C)] 98 5 °F (36 9 °C)  HR:  [71-96] 71  Resp:  [16-18] 16  BP: (107-137)/(56-72) 109/56  SpO2:  [97 %-100 %] 98 %  Body mass index is 23 44 kg/m²    Medications:   Scheduled Meds:   Current Facility-Administered Medications:  acetaminophen 650 mg Oral Q6H PRN    dihydroergotamine 0 7 mg Intravenous Once    dihydroergotamine 1 mg Intravenous Once    diphenhydrAMINE 25 mg Intravenous Q6H PRN    ketorolac 30 mg Intravenous Q6H PRN    metoclopramide 10 mg Intravenous Q6H PRN    norethindrone-ethinyl estradiol 1 tablet Oral Daily    ondansetron 4 mg Intravenous Q6H PRN    ondansetron 4 mg Intravenous Q8H    sodium chloride 150 mL/hr Intravenous Continuous Last Rate: 150 mL/hr (18 2345)   valproate sodium (DEPACON) injection for headache 1 g Intravenous Daily PRN      Continuous Infusions:   sodium chloride 150 mL/hr Last Rate: 150 mL/hr (185)     PRN Meds:    diphenhydrAMINE   25 mg iv - used x 1    Ketorolac  30 mg iv - used x 1      Metoclopramide  10 mg iv - used x       Abnormal Labs/Diagnostic Results:   CO2-20  Bun 9  Creatinine 0 57  Wbc 10 18    Age/Sex: 24 y o  female     Assessment/Plan:   Intractable migraine   Assessment & Plan     · Patient reports current migraine has lasted for 12 days, typical pattern for her and the only difference in her current migraine if that is that it lasted this long  ? She called Dr Gisel Ortega office with whom she follows with, was told to come to the emergency department  ? Had tried Excedrin and Imitrex with no relief  · Patient does not report headache at this time, but this can be typical for her  Her migraines will come on at any time, with any trigger    Typically she does not have headache in the morning  · Continue Toradol, Reglan, Benadryl scheduled every 6 hours  ? If patient does not have headache after 1800 dose, transition to p r n  Basis  If no headache overnight will be able to be discharged tomorrow morning  · Neurology consulted  · Per Neurology will try DHE if recurrence of headache with scheduled migraine cocktail  · Zofran as needed for nausea  · Supportive environment dimming lights, reducing noise             Discharge Plan: to be determined  [address]  Utilization Review Department  Phone: [phone]; Fax [phone]  [email or web address]  ATTENTION: Please call with any questions or concerns to [phone]  and carefully listen to the prompts so that you are directed to the right person  Send all requests for admission clinical reviews, approved or denied determinations and any other requests to fax [phone]   All voicemails are confidential

## 2018-11-23 ENCOUNTER — TRANSITIONAL CARE MANAGEMENT (OUTPATIENT)
Dept: INTERNAL MEDICINE CLINIC | Facility: CLINIC | Age: 23
End: 2018-11-23

## 2018-11-23 ENCOUNTER — TELEPHONE (OUTPATIENT)
Dept: NEUROLOGY | Facility: CLINIC | Age: 23
End: 2018-11-23

## 2018-11-23 NOTE — TELEPHONE ENCOUNTER
----- Message from Levander Sandhoff, PA-C sent at 11/21/2018 12:08 PM EST -----  Regarding: HFU    Diagnosis/Reason for follow-up:  Intractable migraine  Subspecialty for follow-up:  Headache clinic  Recommended timing for HFU:  2-3 weeks  Existing neurologist:  Tanner  Tests/Labs/Imaging ordered: none  Orders placed electronically: none  Additional notes: none    Thank you!

## 2018-11-28 ENCOUNTER — OFFICE VISIT (OUTPATIENT)
Dept: INTERNAL MEDICINE CLINIC | Facility: CLINIC | Age: 23
End: 2018-11-28
Payer: COMMERCIAL

## 2018-11-28 VITALS
HEART RATE: 64 BPM | DIASTOLIC BLOOD PRESSURE: 70 MMHG | BODY MASS INDEX: 23.66 KG/M2 | OXYGEN SATURATION: 99 % | SYSTOLIC BLOOD PRESSURE: 128 MMHG | HEIGHT: 65 IN | WEIGHT: 142 LBS

## 2018-11-28 DIAGNOSIS — G43.709 CHRONIC MIGRAINE WITHOUT AURA WITHOUT STATUS MIGRAINOSUS, NOT INTRACTABLE: Primary | ICD-10-CM

## 2018-11-28 PROCEDURE — 99496 TRANSJ CARE MGMT HIGH F2F 7D: CPT | Performed by: NURSE PRACTITIONER

## 2018-11-28 NOTE — PROGRESS NOTES
Assessment/Plan:     Diagnoses and all orders for this visit:    Chronic migraine without aura without status migrainosus, not intractable    Other orders  -     Galcanezumab-gnlm (EMGALITY SC); Inject under the skin every 30 (thirty) days        Follow up with neurology    Subjective:      Patient ID: Nathaniel Mcintosh is a 24 y o  female  Patient is here for hospital follow up of intractable migraine for 2 weeks  Seeing  Neurology    Headache is there but much better        The following portions of the patient's history were reviewed and updated as appropriate: allergies, current medications, past family history, past medical history, past social history, past surgical history and problem list     Review of Systems   Constitutional: Negative  HENT: Negative  Eyes: Negative  Respiratory: Negative  Cardiovascular: Negative  Gastrointestinal: Negative  Musculoskeletal: Negative  Neurological: Positive for headaches  Objective:      /70 (BP Location: Left arm, Patient Position: Sitting, Cuff Size: Adult)   Pulse 64   Ht 5' 5" (1 651 m)   Wt 64 4 kg (142 lb)   LMP 11/21/2018 (Approximate)   SpO2 99%   BMI 23 63 kg/m²          Physical Exam   Constitutional: She is oriented to person, place, and time  She appears well-developed and well-nourished  HENT:   Head: Normocephalic and atraumatic  Eyes: Pupils are equal, round, and reactive to light  Conjunctivae are normal    Neck: Normal range of motion  Neck supple  Cardiovascular: Normal rate and regular rhythm  Pulmonary/Chest: Effort normal and breath sounds normal    Abdominal: Soft  Bowel sounds are normal    Musculoskeletal: Normal range of motion  Neurological: She is alert and oriented to person, place, and time  Skin: Skin is warm and dry           TCM Call (since 10/30/2018)     Date and time call was made  11/23/2018 10:00 AM    Hospital care reviewed  Records reviewed    Patient was hospitialized at [address] [facility]    Date of Admission  11/20/18    Date of discharge  11/22/18    Diagnosis  Intractable migraine    Disposition  Home    Current Symptoms  Headache    Headache pain severity  Mild      TCM Call (since 10/30/2018)     Post hospital issues  None    Should patient be enrolled in anticoag monitoring? No    Scheduled for follow up?   Yes    Patients specialists  Neurologist    Neurologist name  Damian Park    Did you obtain your prescribed medications  Yes    Do you need help managing your prescriptions or medications  No    I have advised the patient to call PCP with any new or worsening symptoms  Lorri Party,     Are you recieving any outpatient services  No    Are you recieving home care services  No    Are you using any community resources  No    Have you fallen in the last 12 months  No    Interperter language line needed  No    Counseling  Patient

## 2018-12-14 DIAGNOSIS — G43.709 CHRONIC MIGRAINE WITHOUT AURA WITHOUT STATUS MIGRAINOSUS, NOT INTRACTABLE: Primary | ICD-10-CM

## 2018-12-14 NOTE — TELEPHONE ENCOUNTER
Received voicemail from patient requesting refill of Emgality  Orders entered, please approve if appropriate

## 2018-12-14 NOTE — TELEPHONE ENCOUNTER
Patient made aware that we received her voicemail and refill request was sent to provider  Patient verbalized clear understanding

## 2018-12-17 NOTE — PROGRESS NOTES
Patient ID: Andrei Kulkarni is a 24 y o  female  Assessment/Plan:    Chronic migraine without aura without status migrainosus, not intractable  Preventative:  Continue Emgality every 30 days  Start Lamictal 1 tab at bedtime for 7 nights, 2 tabs for 7 nights, 3 tabs for 7 nights and then 4 tabs     Abortive: At onset of migraine take frova  May repeat in 2 hours if needed  Limit of 3/week or 9 a month  When you take the frova, take Ketorolac with it  This can repeated in 8 hours  Limit of 10 a month    For next 5 days take Decadron in am    For next 14 nights take Cyproheptadine (then as needed for weather headaches)      If Emgality fails we will try Botox      Intractable migraine  Discussed with her that we will attempt to break her headache cycles and then work on 50% reduction         Problem List Items Addressed This Visit        Cardiovascular and Mediastinum    Chronic migraine without aura without status migrainosus, not intractable - Primary     Preventative:  Continue Emgality every 30 days  Start Lamictal 1 tab at bedtime for 7 nights, 2 tabs for 7 nights, 3 tabs for 7 nights and then 4 tabs     Abortive: At onset of migraine take frova  May repeat in 2 hours if needed  Limit of 3/week or 9 a month  When you take the frova, take Ketorolac with it  This can repeated in 8 hours    Limit of 10 a month    For next 5 days take Decadron in am    For next 14 nights take Cyproheptadine (then as needed for weather headaches)      If Emgality fails we will try Botox           Relevant Medications    ketorolac (TORADOL) 10 mg tablet    frovatriptan (FROVA) 2 5 MG tablet    dexamethasone (DECADRON) 2 mg tablet    cyproheptadine (PERIACTIN) 4 mg tablet    lamoTRIgine (LaMICtal) 25 mg tablet    Intractable migraine     Discussed with her that we will attempt to break her headache cycles and then work on 50% reduction         Relevant Medications    ketorolac (TORADOL) 10 mg tablet    frovatriptan (FROVA) 2 5 MG tablet    lamoTRIgine (LaMICtal) 25 mg tablet           Subjective:    HPI  We had the pleasure of evaluating Kenya Wray in neurological follow up today  As you know she is a 24year old right handed female  She goes to  school in [place]  She is here today for evaluation of her headaches  She is very frustrated with her headaches  Was hospitalized in November for DHE and additional interventions stemming from an intractable migraine for 10+ days        Chronic migraine headaches:   PREVENTIVE used: mag- caused headaches, B2, amitriptyline, venlafaxine (sedating),  zonegran, Zoloft ( did help with headaches but caused anxiety), Aimovig, Emaglity  ABORTIVE used: Excedrin, Advil, Sumatriptan, IV ketorolac- not effective, dexamethasone- effective, olanzapine- effective, Compazine  Non-Medical/Alternative Treatments used in the past for headaches: chiropractor  Headache trigger: Winter time, watching TV without glasses, Possibly one trigger is alcohol, as she usually gets a migraine 2-3 days after consuming alcohol  Aura: none     How often do the headaches occur -since starting Emgality,  After was hospitalized was on a steroid taper  Was getting headaches but not migraines  Did not take sumatriptan during this  Also did not take Excedrin  Finished steroid taper around 12/6/18 and since that time has had headaches daily and migraines 4-5 times a week  These have required Excedrin and/or Sumatriptan  How long do the headaches last - 1 5 hours to all day or until she takes Sumatriptan     Where are they located - frontal, orbits, at time they radiates to the back of her head  What is the intensity of pain -average pain level 8/10, up to 10/10  Describe your usual headache - Pressure, Throbbing, sharp pain  Associated symptoms: nausea, decrease appetite, photophobia, blurred vision, phonophobia, sensitive to smells, Problem with concentration, light-headed or dizzy, stiff or sore neck, prefer to be alone and in a dark room, unable to work        The following portions of the patient's history were reviewed and updated as appropriate: allergies, current medications, past family history, past medical history, past social history, past surgical history and problem list          Objective:    Blood pressure 116/82, height 5' 5" (1 651 m), weight 64 kg (141 lb), last menstrual period 11/21/2018  Physical Exam   Constitutional: She appears well-developed and well-nourished  HENT:   Head: Normocephalic and atraumatic  Eyes: Pupils are equal, round, and reactive to light  Lids are normal    Neck: Normal range of motion  Cardiovascular: Normal rate  Pulmonary/Chest: Effort normal    Musculoskeletal: Normal range of motion  Neurological: She has normal strength and normal reflexes  Coordination normal    Skin: Skin is warm and dry  Psychiatric: Her speech is normal    Very tearful   Nursing note and vitals reviewed  Neurological Exam  Mental Status   Oriented to person, place, time and situation  Recent and remote memory are intact  Speech is normal  Language is fluent with no aphasia  Attention and concentration are normal     Cranial Nerves  CN II: Visual acuity is normal  Visual fields full to confrontation  CN III, IV, VI: Extraocular movements intact bilaterally  Normal lids and orbits bilaterally  Pupils equal round and reactive to light bilaterally  CN V: Facial sensation is normal   CN VII: Full and symmetric facial movement  CN VIII: Hearing is normal   CN IX, X: Palate elevates symmetrically  Normal gag reflex  CN XI: Shoulder shrug strength is normal   CN XII: Tongue midline without atrophy or fasciculations  Motor  Normal muscle bulk throughout  No fasciculations present  Normal muscle tone  Strength is 5/5 throughout all four extremities      Sensory  Sensation is intact to light touch, pinprick, vibration and proprioception in all four extremities  Reflexes  Deep tendon reflexes are 2+ and symmetric in all four extremities with downgoing toes bilaterally  Coordination  Finger-to-nose, rapid alternating movements and heel-to-shin normal bilaterally without dysmetria  Gait  Normal casual, toe, heel and tandem gait  ROS:    Review of Systems  Constitutional: Negative  HENT: Negative  Eyes: Negative  Respiratory: Negative  Cardiovascular: Negative  Gastrointestinal: Negative  Endocrine: Negative  Genitourinary: Negative  Musculoskeletal: Negative  Skin: Negative  Allergic/Immunologic: Negative  Neurological: Positive for headaches  Hematological: Negative  Psychiatric/Behavioral: Positive for decreased concentration (Occasionally )

## 2018-12-19 ENCOUNTER — TELEPHONE (OUTPATIENT)
Dept: NEUROLOGY | Facility: CLINIC | Age: 23
End: 2018-12-19

## 2018-12-19 ENCOUNTER — OFFICE VISIT (OUTPATIENT)
Dept: NEUROLOGY | Facility: CLINIC | Age: 23
End: 2018-12-19
Payer: COMMERCIAL

## 2018-12-19 VITALS
WEIGHT: 141 LBS | HEIGHT: 65 IN | SYSTOLIC BLOOD PRESSURE: 116 MMHG | DIASTOLIC BLOOD PRESSURE: 82 MMHG | BODY MASS INDEX: 23.49 KG/M2

## 2018-12-19 DIAGNOSIS — G43.011 INTRACTABLE MIGRAINE WITHOUT AURA AND WITH STATUS MIGRAINOSUS: ICD-10-CM

## 2018-12-19 DIAGNOSIS — G43.709 CHRONIC MIGRAINE WITHOUT AURA WITHOUT STATUS MIGRAINOSUS, NOT INTRACTABLE: Primary | ICD-10-CM

## 2018-12-19 PROCEDURE — 99214 OFFICE O/P EST MOD 30 MIN: CPT | Performed by: PHYSICIAN ASSISTANT

## 2018-12-19 RX ORDER — LAMOTRIGINE 25 MG/1
TABLET ORAL
Qty: 120 TABLET | Refills: 0 | Status: SHIPPED | OUTPATIENT
Start: 2018-12-19 | End: 2019-01-15 | Stop reason: SDUPTHER

## 2018-12-19 RX ORDER — CYPROHEPTADINE HYDROCHLORIDE 4 MG/1
4 TABLET ORAL
Qty: 30 TABLET | Refills: 0 | Status: SHIPPED | OUTPATIENT
Start: 2018-12-19 | End: 2019-05-15 | Stop reason: ALTCHOICE

## 2018-12-19 RX ORDER — DEXAMETHASONE 2 MG/1
2 TABLET ORAL 2 TIMES DAILY WITH MEALS
Qty: 5 TABLET | Refills: 0 | Status: SHIPPED | OUTPATIENT
Start: 2018-12-19 | End: 2018-12-19 | Stop reason: SDUPTHER

## 2018-12-19 RX ORDER — FROVATRIPTAN SUCCINATE 2.5 MG/1
TABLET, FILM COATED ORAL
Qty: 9 TABLET | Refills: 0 | Status: SHIPPED | OUTPATIENT
Start: 2018-12-19 | End: 2019-05-15 | Stop reason: ALTCHOICE

## 2018-12-19 RX ORDER — LAMOTRIGINE 25 MG/1
TABLET ORAL
Qty: 120 TABLET | Refills: 1 | Status: SHIPPED | OUTPATIENT
Start: 2018-12-19 | End: 2018-12-19 | Stop reason: SDUPTHER

## 2018-12-19 RX ORDER — CYPROHEPTADINE HYDROCHLORIDE 4 MG/1
4 TABLET ORAL
Qty: 30 TABLET | Refills: 0 | Status: SHIPPED | OUTPATIENT
Start: 2018-12-19 | End: 2018-12-19 | Stop reason: SDUPTHER

## 2018-12-19 RX ORDER — DEXAMETHASONE 2 MG/1
2 TABLET ORAL 2 TIMES DAILY WITH MEALS
Qty: 5 TABLET | Refills: 0 | Status: SHIPPED | OUTPATIENT
Start: 2018-12-19 | End: 2018-12-31 | Stop reason: SDUPTHER

## 2018-12-19 RX ORDER — KETOROLAC TROMETHAMINE 10 MG/1
10 TABLET, FILM COATED ORAL EVERY 6 HOURS PRN
Qty: 20 TABLET | Refills: 0 | Status: SHIPPED | OUTPATIENT
Start: 2018-12-19 | End: 2019-05-15 | Stop reason: ALTCHOICE

## 2018-12-19 RX ORDER — FROVATRIPTAN SUCCINATE 2.5 MG/1
TABLET, FILM COATED ORAL
Qty: 9 TABLET | Refills: 0 | Status: SHIPPED | OUTPATIENT
Start: 2018-12-19 | End: 2018-12-19 | Stop reason: SDUPTHER

## 2018-12-19 NOTE — TELEPHONE ENCOUNTER
pt called and states that she only received 20 takes of lamictal and she should have received 120   i made her aware that she would have to call pharm regardign this  she also states that [facility] needs PA and that [facility] did not have [facility]adol script   toradol was sent to [facility]   i called [facility] and verifed that PA is needed for frova   toradol rx called into [facility] and cancelled at [facility]  they also received decadron and cyproheptadine scripts  she will cancel those also      Diego RAY completed on [facility]

## 2018-12-19 NOTE — ASSESSMENT & PLAN NOTE
Preventative:  Continue Emgality every 30 days  Start Lamictal 1 tab at bedtime for 7 nights, 2 tabs for 7 nights, 3 tabs for 7 nights and then 4 tabs     Abortive: At onset of migraine take frova  May repeat in 2 hours if needed  Limit of 3/week or 9 a month  When you take the frova, take Ketorolac with it  This can repeated in 8 hours    Limit of 10 a month    For next 5 days take Decadron in am    For next 14 nights take Cyproheptadine (then as needed for weather headaches)      If Emgality fails we will try Botox

## 2018-12-19 NOTE — PROGRESS NOTES
Review of Systems   Constitutional: Negative  HENT: Negative  Eyes: Negative  Respiratory: Negative  Cardiovascular: Negative  Gastrointestinal: Negative  Endocrine: Negative  Genitourinary: Negative  Musculoskeletal: Negative  Skin: Negative  Allergic/Immunologic: Negative  Neurological: Positive for headaches  Hematological: Negative  Psychiatric/Behavioral: Positive for decreased concentration (Occasionally )

## 2018-12-19 NOTE — PATIENT INSTRUCTIONS
Preventative:  Continue Emgality every 30 days  Start Lamictal 1 tab at bedtime for 7 nights, 2 tabs for 7 nights, 3 tabs for 7 nights and then 4 tabs     Abortive: At onset of migraine take frova  May repeat in 2 hours if needed  Limit of 3/week or 9 a month  When you take the frova, take Ketorolac with it  This can repeated in 8 hours  Limit of 10 a month    For next 5 days take Decadron in am    For next 14 nights take Cyproheptadine (then as needed for weather headaches)    Call next week to update us  Call us if worsen      Neck pain:   - We discussed the role of her neck pathology and poor posture, with straightening of the normal cervical lordosis, in her headaches  We discussed how tightening of the neck muscles can irritate the nerves in the occipital region of her head and cause or worsen head pain  We also discussed and demonstrated neck strengthening and relaxation exercises, as well as giving written instructions on these exercises  - We talked about the importance of good posture for improving her shoulder, neck, and head pain  The patient was given visualization exercises for correcting posture, which she will practice at home  If this simple exercise does not help improve the posture, we will consider formal physical therapy in the future  Medication overuse headaches:   - We discussed medication overuse headache [facility]) and how to avoid it in the future  It was explained that all analgesics have the potential to cause medication overuse headache [facility]) and analgesic overuse can negate the effectiveness of headache preventive measures  After successful [address] treatment, preventive medications for an underlying primary headache disorder have a greater chance for success  Avoid medications with narcotics, barbiturates, or caffeine in them as these can cause rebound headaches after very few doses and can interfere with other headache medicine efficacy   Taking any analgesics for more than 2-3 days a week can cause medication overuse headache  Reproductive age women: Should take folic acid daily when taking anti-seizure drugs especially Depakote  Over-the-counter supplements: to decrease intensity and frequency of migraines  - Magnesium Oxide 400-500 mg a day  If any diarrhea or upset stomach, decrease dose  as tolerated  -  B2 200 mg twice a day  This supplement will change the color of the urine to fluorescent yellow no matter how hydrated, which is normal       Headache management instructions  - When patient has a moderate to severe headache, they should seek rest, initiate relaxation and apply cold compresses to the head  - Maintain regular sleep schedule  Adults need at least 7-8 hours of uninterrupted a night  - Limit over the counter medications such as Tylenol, Ibuprofen, Aleve, Excedrin  (No more than 3 times a week)  - Maintain headache diary  We discussed an [employer] for a smart phone is "Migraine karsten"  - Limit caffeine to 1-2 cups 8 to 16 oz a day or less  - Avoid dietary trigger  (aged cheese, peanuts, MSG, aspartame and nitrates)  - Patient is to have regular frequent meals to prevent headache onset  - Please drink at least 64 ounces of water a day to help remain hydrated  Please call with any questions or concerns   Office number is [phone]

## 2018-12-26 ENCOUNTER — TELEPHONE (OUTPATIENT)
Dept: INTERNAL MEDICINE CLINIC | Facility: CLINIC | Age: 23
End: 2018-12-26

## 2018-12-26 DIAGNOSIS — G43.709 CHRONIC MIGRAINE WITHOUT AURA WITHOUT STATUS MIGRAINOSUS, NOT INTRACTABLE: ICD-10-CM

## 2018-12-26 NOTE — TELEPHONE ENCOUNTER
Pt called to give an update, states that she has only had 1-2 migraines since last seen  She has finished her steroid  She continues with her cyproheptadine and lamotrigine

## 2018-12-26 NOTE — TELEPHONE ENCOUNTER
Ok to schedule PPD if that is all she needs   If the form is asking for a " physical" please schedule a wellness

## 2018-12-26 NOTE — TELEPHONE ENCOUNTER
Patient needs a TB test for school  She is in physical therapy  She also has a form that needs to be filled out      Please advise

## 2018-12-28 ENCOUNTER — CLINICAL SUPPORT (OUTPATIENT)
Dept: INTERNAL MEDICINE CLINIC | Facility: CLINIC | Age: 23
End: 2018-12-28
Payer: COMMERCIAL

## 2018-12-28 DIAGNOSIS — Z11.1 SCREENING FOR TUBERCULOSIS: Primary | ICD-10-CM

## 2018-12-28 PROCEDURE — 86580 TB INTRADERMAL TEST: CPT

## 2018-12-31 ENCOUNTER — CLINICAL SUPPORT (OUTPATIENT)
Dept: INTERNAL MEDICINE CLINIC | Facility: CLINIC | Age: 23
End: 2018-12-31

## 2018-12-31 DIAGNOSIS — Z11.1 SCREENING FOR TUBERCULOSIS: Primary | ICD-10-CM

## 2018-12-31 LAB
INDURATION: 0 MM
TB SKIN TEST: NEGATIVE

## 2018-12-31 RX ORDER — DEXAMETHASONE 2 MG/1
2 TABLET ORAL
Qty: 3 TABLET | Refills: 0 | Status: SHIPPED | OUTPATIENT
Start: 2018-12-31 | End: 2019-02-08 | Stop reason: SDUPTHER

## 2018-12-31 NOTE — TELEPHONE ENCOUNTER
[facility]  Informed her the PA had been approved and to call her pharmacy to get med  Patient then became tearful on the phone, states her migraines were doing better but then came back on Friday 12/28 and hasn't gone away, current pain level  8-9/10, b/l frontal, worse on the left, Throbbing  Excedrin helped for a few hours  Sleep usually helps but hasn't helped this time  She states she doesn't think the cyproheptadine has been helping  She took the imitrex and toradol and it did help but then migraine returned  Please advise

## 2019-01-02 RX ORDER — NORETHINDRONE ACETATE AND ETHINYL ESTRADIOL AND FERROUS FUMARATE 1MG-20(21)
1 KIT ORAL DAILY
Refills: 0 | COMMUNITY
Start: 2018-12-26 | End: 2019-03-06

## 2019-01-03 ENCOUNTER — OFFICE VISIT (OUTPATIENT)
Dept: INTERNAL MEDICINE CLINIC | Facility: CLINIC | Age: 24
End: 2019-01-03
Payer: COMMERCIAL

## 2019-01-03 VITALS
HEIGHT: 65 IN | WEIGHT: 146.8 LBS | RESPIRATION RATE: 14 BRPM | BODY MASS INDEX: 24.46 KG/M2 | DIASTOLIC BLOOD PRESSURE: 68 MMHG | TEMPERATURE: 98.9 F | SYSTOLIC BLOOD PRESSURE: 124 MMHG

## 2019-01-03 DIAGNOSIS — J02.9 ACUTE PHARYNGITIS, UNSPECIFIED ETIOLOGY: Primary | ICD-10-CM

## 2019-01-03 LAB — S PYO AG THROAT QL: NEGATIVE

## 2019-01-03 PROCEDURE — 87070 CULTURE OTHR SPECIMN AEROBIC: CPT | Performed by: NURSE PRACTITIONER

## 2019-01-03 PROCEDURE — 87880 STREP A ASSAY W/OPTIC: CPT | Performed by: NURSE PRACTITIONER

## 2019-01-03 PROCEDURE — 99213 OFFICE O/P EST LOW 20 MIN: CPT | Performed by: NURSE PRACTITIONER

## 2019-01-03 NOTE — PROGRESS NOTES
Assessment/Plan:    No problem-specific Assessment & Plan notes found for this encounter  Diagnoses and all orders for this visit:    Acute pharyngitis, unspecified etiology  -     POCT rapid strepA  -     Throat culture; Future    Other orders  -     JUNEL FE 1/20 1-20 MG-MCG per tablet; Take 1 tablet by mouth daily          Subjective:      Patient ID: Vernon Hernandez is a 24 y o  female  Here for sore throat   Started 4 days ago   +rhinorrhea  Denies any fevers, myalgias, n/v/d, headache, congestion   Today feeling better   Going to [facility] [place] in 4 days for her honeymoon            The following portions of the patient's history were reviewed and updated as appropriate: allergies, current medications, past family history, past medical history, past social history, past surgical history and problem list     Review of Systems   Constitutional: Negative  HENT: Positive for rhinorrhea and sore throat  Respiratory: Negative  Cardiovascular: Negative  Gastrointestinal: Negative  Neurological: Negative  Objective:      /68   Temp 98 9 °F (37 2 °C) (Tympanic)   Resp 14   Ht 5' 5" (1 651 m)   Wt 66 6 kg (146 lb 12 8 oz)   BMI 24 43 kg/m²          Physical Exam   Constitutional: She is oriented to person, place, and time  She appears well-developed and well-nourished  HENT:   Right Ear: External ear normal    Left Ear: External ear normal    Mouth/Throat: Posterior oropharyngeal erythema present  No oropharyngeal exudate or posterior oropharyngeal edema  Cardiovascular: Normal rate, regular rhythm and normal heart sounds  Pulmonary/Chest: Effort normal and breath sounds normal    Lymphadenopathy:        Head (left side): Tonsillar adenopathy present  Neurological: She is alert and oriented to person, place, and time  Psychiatric: She has a normal mood and affect  Her behavior is normal    Vitals reviewed

## 2019-01-05 LAB — BACTERIA THROAT CULT: NORMAL

## 2019-01-15 DIAGNOSIS — G43.709 CHRONIC MIGRAINE WITHOUT AURA WITHOUT STATUS MIGRAINOSUS, NOT INTRACTABLE: ICD-10-CM

## 2019-01-15 RX ORDER — LAMOTRIGINE 100 MG/1
100 TABLET ORAL DAILY
Qty: 30 TABLET | Refills: 2 | Status: SHIPPED | OUTPATIENT
Start: 2019-01-15 | End: 2019-03-19 | Stop reason: SDUPTHER

## 2019-02-08 DIAGNOSIS — G43.709 CHRONIC MIGRAINE WITHOUT AURA WITHOUT STATUS MIGRAINOSUS, NOT INTRACTABLE: ICD-10-CM

## 2019-02-08 RX ORDER — DEXAMETHASONE 2 MG/1
2 TABLET ORAL
Qty: 5 TABLET | Refills: 0 | Status: SHIPPED | OUTPATIENT
Start: 2019-02-08 | End: 2019-02-21 | Stop reason: SDUPTHER

## 2019-02-08 NOTE — TELEPHONE ENCOUNTER
Pt requesting refill of decadron  Has had migraine for past 7 days  No relief with Frova or toradol and decadron worked last time  Rx entered  No need to call pt unless there is an issue  Pt [phone]  Ok to leave detailed message

## 2019-02-13 DIAGNOSIS — G43.709 CHRONIC MIGRAINE WITHOUT AURA WITHOUT STATUS MIGRAINOSUS, NOT INTRACTABLE: ICD-10-CM

## 2019-02-19 ENCOUNTER — TELEPHONE (OUTPATIENT)
Dept: OTHER | Facility: OTHER | Age: 24
End: 2019-02-19

## 2019-02-19 NOTE — TELEPHONE ENCOUNTER
The patient called to let the office know she is out of town and she be getting  Skin tb test done at another location

## 2019-02-20 ENCOUNTER — TELEPHONE (OUTPATIENT)
Dept: NEUROLOGY | Facility: CLINIC | Age: 24
End: 2019-02-20

## 2019-02-20 NOTE — PROGRESS NOTES
[facility] [facility]  PATIENT:  Denise Larkin  MRN:  [id number]  :  1995  DATE OF SERVICE:  2019      Assessment/Plan:        Problem List Items Addressed This Visit        Cardiovascular and Mediastinum    Chronic migraine without aura without status migrainosus, not intractable - Primary    Relevant Medications    dexamethasone (DECADRON) 2 mg tablet    DULoxetine (CYMBALTA) 20 mg capsule    lamoTRIgine (LaMICtal) 25 mg tablet    SUMAtriptan (IMITREX) 100 mg tablet    Other Relevant Orders    Vitamin B12      Other Visit Diagnoses     Vitamin D deficiency        Relevant Orders    Vitamin D 25 hydroxy          Chronic migraine without aura without status migrainosus, not intractable  Preventative:  Continue Emgality every 30 days  Start Lamictal 50 mg in am and 100 mg in pm    - cyproheptadine 4 mg as needed at bedtime for weather related headaches  - Cymbalta 20 mg in am or pm depending on if pt feels tired  Abortive:  - Decadron 1 mg as needed for severe headache if it lasts for more then 24 hours  - At onset of migraine she is to take sumatriptan 100 mg with Toradol 10 mg and if needed may repeat a triptan 2 hours with frova  or take decadron          History of Present Illness: We had the pleasure of evaluating Sinan Wray in neurological follow up today  As you know she is a 24year old right handed female  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[place], [place]  She is here today for evaluation of her headaches   She is very frustrated with her headaches and started crying while in the office today      Previous hospitalization:  Was hospitalized in 2018 for headache infusion days        Chronic migraine headaches:   PREVENTIVE used:  - cyproheptadine 4 mg   -  magnessium 200mg in am, B2, Mag infusion,  - amitriptyline, venlafaxine (sedating),  Zoloft ( did help with headaches but caused anxiety), cymbalta  - zonegran,   - Aimovig, Emaglity  ABORTIVE used:   - Excedrin (at times takes it daily)  - Advil, IV ketorolac- not effective, dexamethasone- effective (uses it once a month),  - Sumatriptan,   - olanzapine- effective,   - Compazine  - Benadryl    Non-Medical/Alternative Treatments used in the past for headaches: chiropractor  Headache trigger: Winter time, watching TV without glasses, Possibly one trigger is alcohol, as she usually gets a migraine 2-3 days after consuming alcohol  Aura: none     What is your current pain level: 2-3/10    How often do the headaches occur - 15 a month now with emgality (on the medication for 4 months) - this week 3 and she is trying not to take something if the headache is later in the day as she would rather sleep  What time of the day headache start: anytime of the day    How long do the headaches last - 1 5 hours to all day or until she takes frova, Toradol, Sumatriptan       Where are they located - frontal, orbits, at time they radiates to the back of her head    What is the intensity of pain -average pain level 8/10, up to 10/10    Describe your usual headache - Pressure, Throbbing, sharp pain    Associated symptoms:   - nausea, decrease appetite,  -  photophobia, phonophobia, sensitive to smells,  - blurred vision,   -  Problem with concentration,  -  light-headed or dizzy, stiff or sore neck,  -  prefer to be alone and in a dark room, unable to work         Past Medical History:   Diagnosis Date    Migraine        Patient Active Problem List   Diagnosis    Chronic migraine without aura without status migrainosus, not intractable    Intractable migraine       Medications:      Current Outpatient Medications   Medication Sig Dispense Refill    aspirin-acetaminophen-caffeine (EXCEDRIN MIGRAINE) 250-250-65 MG per tablet Take 1 tablet by mouth as needed for headaches      frovatriptan (FROVA) 2 5 MG tablet If recurs, may repeat after 2 hours  Max of 3 tabs in 24 hours   9 tablet 0    Galcanezumab-gnlm (EMGALITY) 120 MG/ML SOAJ Inject 1 pen under the skin every 30 (thirty) days 1 pen 11    ketorolac (TORADOL) 10 mg tablet Take 1 tablet (10 mg total) by mouth every 6 (six) hours as needed for moderate pain 20 tablet 0    lamoTRIgine (LaMICtal) 100 mg tablet Take 1 tablet (100 mg total) by mouth daily 30 tablet 2    MAGNESIUM PO Take 100 mg by mouth daily      SUMAtriptan (IMITREX) 100 mg tablet Take 1 tablet (100 mg total) by mouth as needed for migraine Limit 3/week or 9/month 12 tablet 1    cyproheptadine (PERIACTIN) 4 mg tablet Take 1 tablet (4 mg total) by mouth daily at bedtime as needed for allergies (Patient not taking: Reported on 2/21/2019) 30 tablet 0    dexamethasone (DECADRON) 2 mg tablet Take 0 5 tablets (1 mg total) by mouth daily with breakfast One pill at the onset of a 24 hours headache with food 10 tablet 0    DULoxetine (CYMBALTA) 20 mg capsule One tab a day 30 capsule 3    JUNEL FE 1/20 1-20 MG-MCG per tablet Take 1 tablet by mouth daily  0    lamoTRIgine (LaMICtal) 25 mg tablet Two in am 60 tablet 3     No current facility-administered medications for this visit  Allergies: Allergies   Allergen Reactions    Lactose Intolerance (Gi)        Family History:     Family History   Problem Relation Age of Onset    Arrhythmia Mother        Social History:     Social History     Socioeconomic History    Marital status: /Civil Union     Spouse name: Not on file    Number of children: Not on file    Years of education: Not on file    Highest education level: Not on file   Occupational History    Not on file   Social Needs    Financial resource strain: Not on file    Food insecurity:     Worry: Not on file     Inability: Not on file    Transportation needs:     Medical: Not on file     Non-medical: Not on file   Tobacco Use    Smoking status: Never Smoker    Smokeless tobacco: Never Used   Substance and Sexual Activity    Alcohol use: Yes     Comment: socially    Drug use:  No  Sexual activity: Not on file   Lifestyle    Physical activity:     Days per week: Not on file     Minutes per session: Not on file    Stress: Not on file   Relationships    Social connections:     Talks on phone: Not on file     Gets together: Not on file     Attends Orthodox service: Not on file     Active member of club or organization: Not on file     Attends meetings of clubs or organizations: Not on file     Relationship status: Not on file    Intimate partner violence:     Fear of current or ex partner: Not on file     Emotionally abused: Not on file     Physically abused: Not on file     Forced sexual activity: Not on file   Other Topics Concern    Not on file   Social History Narrative    Not on file         Objective:   Physical Exam:                                                                   Vitals:            /75 (BP Location: Right arm, Patient Position: Sitting, Cuff Size: Adult)   Pulse 66   Ht 5' 5" (1 651 m)   Wt 66 2 kg (146 lb)   BMI 24 30 kg/m²   BP Readings from Last 3 Encounters:   02/21/19 123/75   01/03/19 124/68   12/19/18 116/82     Pulse Readings from Last 3 Encounters:   02/21/19 66   11/28/18 64   11/22/18 62            CONSTITUTIONAL: Well developed, well nourished, well groomed  No dysmorphic features  Eyes:  PERRLA, EOM normal      Neck:  Normal ROM, neck supple  HEENT:  Normocephalic atraumatic  No meningismus  Oropharynx is clear and moist  No oral mucosal lesions  Chest:  Respirations regular and unlabored  Cardiovascular:  Distal extremities warm without palpable edema or tenderness, no observed significant swelling  Musculoskeletal:  Full range of motion  Skin:  warm and dry   Psychiatric:  Normal behavior and appropriate affect        Neurological Examination:   Mental status/cognitive function: Orientated to time, place and person     Cranial Nerves: 2 to 12 intact  Motor Exam:  5/5 upper and lower extremity  Sensory: grossly intact light touch in all extremities  Reflexes: 2/4 throughout  Coordination: Finger nose finger intact bilaterally, no tremor noted  Gait: steady casual and tandem gait  Romberg Negative  Review of Systems:   Review of Systems   Constitutional: Negative  HENT: Negative  Eyes: Negative  Respiratory: Negative  Cardiovascular: Negative  Gastrointestinal: Negative  Endocrine: Negative  Genitourinary: Negative  Musculoskeletal: Negative  Skin: Negative  Allergic/Immunologic: Negative  Neurological: Positive for headaches  Hematological: Negative  Psychiatric/Behavioral: Negative  I personally reviewed and updated the ROS that was entered by the medical assistant       I have spent 30 minutes with Patient  today in which greater than 50% of this time was spent in counseling/coordination of care regarding Intructions for management, Patient and family education, Importance of tx compliance, Risk factor reductions, Impressions and plan of care as above        Author:  Patricia Crystal MD 2/21/2019 3:52 PM

## 2019-02-20 NOTE — TELEPHONE ENCOUNTER
Patient called back and is agreeable to reschedule for tomorrow 2/21/19 at 12 in [facility] with Dr Khan Deal

## 2019-02-20 NOTE — TELEPHONE ENCOUNTER
Left message regarding appt today with Dr Hugh Hutton  Due to impending bad weather we are looking to move patient's for safety reasons  Requested call back  2 available spots tomorrow with Dr Hugh Hutton

## 2019-02-21 ENCOUNTER — OFFICE VISIT (OUTPATIENT)
Dept: NEUROLOGY | Facility: CLINIC | Age: 24
End: 2019-02-21
Payer: COMMERCIAL

## 2019-02-21 ENCOUNTER — APPOINTMENT (OUTPATIENT)
Dept: LAB | Facility: CLINIC | Age: 24
End: 2019-02-21
Payer: COMMERCIAL

## 2019-02-21 VITALS
WEIGHT: 146 LBS | DIASTOLIC BLOOD PRESSURE: 75 MMHG | SYSTOLIC BLOOD PRESSURE: 123 MMHG | BODY MASS INDEX: 24.32 KG/M2 | HEART RATE: 66 BPM | HEIGHT: 65 IN

## 2019-02-21 DIAGNOSIS — E55.9 VITAMIN D DEFICIENCY: ICD-10-CM

## 2019-02-21 DIAGNOSIS — G43.709 CHRONIC MIGRAINE WITHOUT AURA WITHOUT STATUS MIGRAINOSUS, NOT INTRACTABLE: ICD-10-CM

## 2019-02-21 DIAGNOSIS — G43.709 CHRONIC MIGRAINE WITHOUT AURA WITHOUT STATUS MIGRAINOSUS, NOT INTRACTABLE: Primary | ICD-10-CM

## 2019-02-21 LAB
25(OH)D3 SERPL-MCNC: 26.2 NG/ML (ref 30–100)
VIT B12 SERPL-MCNC: 364 PG/ML (ref 100–900)

## 2019-02-21 PROCEDURE — 36415 COLL VENOUS BLD VENIPUNCTURE: CPT

## 2019-02-21 PROCEDURE — 99214 OFFICE O/P EST MOD 30 MIN: CPT | Performed by: PSYCHIATRY & NEUROLOGY

## 2019-02-21 PROCEDURE — 82607 VITAMIN B-12: CPT

## 2019-02-21 PROCEDURE — 82306 VITAMIN D 25 HYDROXY: CPT

## 2019-02-21 RX ORDER — SUMATRIPTAN 100 MG/1
100 TABLET, FILM COATED ORAL AS NEEDED
Qty: 12 TABLET | Refills: 1 | Status: SHIPPED | OUTPATIENT
Start: 2019-02-21 | End: 2019-05-15 | Stop reason: ALTCHOICE

## 2019-02-21 RX ORDER — DULOXETIN HYDROCHLORIDE 20 MG/1
CAPSULE, DELAYED RELEASE ORAL
Qty: 30 CAPSULE | Refills: 3 | Status: SHIPPED | OUTPATIENT
Start: 2019-02-21 | End: 2019-05-15 | Stop reason: SDUPTHER

## 2019-02-21 RX ORDER — DEXAMETHASONE 2 MG/1
1 TABLET ORAL
Qty: 10 TABLET | Refills: 0 | Status: SHIPPED | OUTPATIENT
Start: 2019-02-21 | End: 2019-04-26 | Stop reason: SDUPTHER

## 2019-02-21 RX ORDER — LAMOTRIGINE 25 MG/1
TABLET ORAL
Qty: 60 TABLET | Refills: 3 | Status: SHIPPED | OUTPATIENT
Start: 2019-02-21 | End: 2019-06-19 | Stop reason: SDUPTHER

## 2019-02-21 NOTE — PATIENT INSTRUCTIONS
Chronic migraine without aura without status migrainosus, not intractable  Preventative:  Continue Emgality every 30 days  Start Lamictal 50 mg in am and 100 mg in pm    - cyproheptadine 4 mg as needed at bedtime for weather related headaches  - Cymbalta 20 mg in am or pm depending on if pt feels tired  Abortive:  - Decadron 1 mg as needed for severe headache if it lasts for more then 24 hours  - At onset of migraine she is to take sumatriptan 100 mg with Toradol 10 mg and if needed may repeat a triptan 2 hours with frova  or take decadron

## 2019-02-25 ENCOUNTER — TELEPHONE (OUTPATIENT)
Dept: NEUROLOGY | Facility: CLINIC | Age: 24
End: 2019-02-25

## 2019-02-25 NOTE — TELEPHONE ENCOUNTER
----- Message from Laly Taylor RN sent at 2/22/2019 12:18 PM EST -----      ----- Message -----  From: Charisse Matos PA-C  Sent: 2/22/2019  11:06 AM  To: [facility]    Please call the patient regarding her abnormal result  Have her start taking sublingual B12 1000 mcg and vitamin D3 2000 units  Both are daily OTC medications    Thanks

## 2019-03-06 ENCOUNTER — ANNUAL EXAM (OUTPATIENT)
Dept: OBGYN CLINIC | Facility: CLINIC | Age: 24
End: 2019-03-06
Payer: COMMERCIAL

## 2019-03-06 VITALS — SYSTOLIC BLOOD PRESSURE: 138 MMHG | DIASTOLIC BLOOD PRESSURE: 78 MMHG | BODY MASS INDEX: 24.5 KG/M2 | WEIGHT: 147.2 LBS

## 2019-03-06 DIAGNOSIS — Z01.419 ENCOUNTER FOR GYNECOLOGICAL EXAMINATION (GENERAL) (ROUTINE) WITHOUT ABNORMAL FINDINGS: Primary | ICD-10-CM

## 2019-03-06 PROBLEM — F41.9 ANXIETY: Status: ACTIVE | Noted: 2017-01-27

## 2019-03-06 PROCEDURE — 99395 PREV VISIT EST AGE 18-39: CPT | Performed by: PHYSICIAN ASSISTANT

## 2019-03-06 NOTE — PROGRESS NOTES
Assessment/Plan  Problem List Items Addressed This Visit        Other    Encounter for gynecological examination (general) (routine) without abnormal findings - Primary     Annual exam performed  STD testing declined  Condoms for contraception  RTO 1 year             Subjective   Lacey Rojas is a 24 y o  female who presents for annual GYN exam  She denies any changes in Medical, Surgical or Family histories  Periods are regular every 28-30 days x 4-5  Dysmenorrhea:moderate, occurring first 1-2 days of flow  She denies intermenstrual bleeding, spotting, or discharge  She reports that she is sexually active with 1 partner and using condoms for contraception  Stopped OCP, it made migraines worse  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, hopes to get job with [employer]    Last Pap smear: 2018  Regular self breast exam: yes    Family history of uterine or ovarian cancer: no  Family history of breast cancer: yes - PGM > 50  Family history of colon cancer: no    Menstrual History:  OB History        0    Para   0    Term   0       0    AB   0    Living   0       SAB   0    TAB   0    Ectopic   0    Multiple   0    Live Births   0                Patient's last menstrual period was 2019  Period Pattern: Regular  Menstrual Flow: Moderate    The following portions of the patient's history were reviewed and updated as appropriate: allergies, current medications, past family history, past medical history, past social history, past surgical history and problem list     Review of Systems  Review of Systems   Constitutional: Negative for chills and fever  Respiratory: Negative for shortness of breath  Cardiovascular: Negative for chest pain  Gastrointestinal: Negative for abdominal pain  Genitourinary: Negative for dysuria, pelvic pain, vaginal bleeding, vaginal discharge and vaginal pain  Negative for breast pain or lumps    Negative for stress urinary incontinence  Neurological: Negative for headaches  Objective   /78 (BP Location: Left arm)   Wt 66 8 kg (147 lb 3 2 oz)   LMP 02/09/2019   BMI 24 50 kg/m²     Physical Exam   Constitutional: She is oriented to person, place, and time  She appears well-developed and well-nourished  Neck: No thyromegaly present  Cardiovascular: Normal rate and regular rhythm  Pulmonary/Chest: Effort normal and breath sounds normal  Right breast exhibits no mass, no nipple discharge, no skin change and no tenderness  Left breast exhibits no mass, no nipple discharge, no skin change and no tenderness  Abdominal: Soft  There is no tenderness  There is no rebound and no guarding  Genitourinary: There is no rash or lesion on the right labia  There is no rash or lesion on the left labia  Uterus is not enlarged and not tender  Cervix exhibits no motion tenderness and no discharge  Right adnexum displays no mass and no tenderness  Left adnexum displays no mass and no tenderness  No bleeding in the vagina  No vaginal discharge found  Neurological: She is alert and oriented to person, place, and time  Psychiatric: She has a normal mood and affect  Nursing note and vitals reviewed

## 2019-03-19 DIAGNOSIS — G43.709 CHRONIC MIGRAINE WITHOUT AURA WITHOUT STATUS MIGRAINOSUS, NOT INTRACTABLE: ICD-10-CM

## 2019-03-19 RX ORDER — LAMOTRIGINE 100 MG/1
100 TABLET ORAL DAILY
Qty: 30 TABLET | Refills: 2 | Status: SHIPPED | OUTPATIENT
Start: 2019-03-19 | End: 2019-06-11 | Stop reason: SDUPTHER

## 2019-03-19 NOTE — TELEPHONE ENCOUNTER
Patient called requesting refill for Lamotrigine 100mg  Orders entered, please approve if appropriate 
No motor or sensory deficits.

## 2019-04-26 ENCOUNTER — TELEPHONE (OUTPATIENT)
Dept: NEUROLOGY | Facility: CLINIC | Age: 24
End: 2019-04-26

## 2019-04-26 DIAGNOSIS — G43.709 CHRONIC MIGRAINE WITHOUT AURA WITHOUT STATUS MIGRAINOSUS, NOT INTRACTABLE: ICD-10-CM

## 2019-04-26 RX ORDER — RIZATRIPTAN BENZOATE 10 MG/1
10 TABLET ORAL ONCE AS NEEDED
Qty: 9 TABLET | Refills: 0 | Status: SHIPPED | OUTPATIENT
Start: 2019-04-26 | End: 2019-05-15 | Stop reason: ALTCHOICE

## 2019-04-26 RX ORDER — OLANZAPINE 5 MG/1
5 TABLET ORAL
Qty: 10 TABLET | Refills: 0 | Status: SHIPPED | OUTPATIENT
Start: 2019-04-26 | End: 2019-05-15 | Stop reason: SDUPTHER

## 2019-04-26 RX ORDER — DEXAMETHASONE 2 MG/1
1 TABLET ORAL
Qty: 10 TABLET | Refills: 0 | Status: SHIPPED | OUTPATIENT
Start: 2019-04-26 | End: 2019-05-15 | Stop reason: ALTCHOICE

## 2019-05-15 ENCOUNTER — OFFICE VISIT (OUTPATIENT)
Dept: NEUROLOGY | Facility: CLINIC | Age: 24
End: 2019-05-15
Payer: COMMERCIAL

## 2019-05-15 VITALS
HEART RATE: 68 BPM | SYSTOLIC BLOOD PRESSURE: 127 MMHG | DIASTOLIC BLOOD PRESSURE: 74 MMHG | HEIGHT: 65 IN | WEIGHT: 145.1 LBS | BODY MASS INDEX: 24.18 KG/M2

## 2019-05-15 DIAGNOSIS — G43.009 MIGRAINE WITHOUT AURA AND WITHOUT STATUS MIGRAINOSUS, NOT INTRACTABLE: Primary | ICD-10-CM

## 2019-05-15 DIAGNOSIS — G43.709 CHRONIC MIGRAINE WITHOUT AURA WITHOUT STATUS MIGRAINOSUS, NOT INTRACTABLE: ICD-10-CM

## 2019-05-15 PROCEDURE — 99214 OFFICE O/P EST MOD 30 MIN: CPT | Performed by: PSYCHIATRY & NEUROLOGY

## 2019-05-15 RX ORDER — OLANZAPINE 5 MG/1
5 TABLET ORAL
Qty: 10 TABLET | Refills: 0 | Status: SHIPPED | OUTPATIENT
Start: 2019-05-15 | End: 2021-04-01

## 2019-05-15 RX ORDER — DEXAMETHASONE 1 MG
1 TABLET ORAL 2 TIMES DAILY WITH MEALS
Qty: 10 TABLET | Refills: 1 | Status: SHIPPED | OUTPATIENT
Start: 2019-05-15 | End: 2021-04-01

## 2019-05-15 RX ORDER — DULOXETIN HYDROCHLORIDE 20 MG/1
CAPSULE, DELAYED RELEASE ORAL
Qty: 60 CAPSULE | Refills: 3 | Status: SHIPPED | OUTPATIENT
Start: 2019-05-15 | End: 2019-09-09 | Stop reason: SDUPTHER

## 2019-05-20 ENCOUNTER — DOCUMENTATION (OUTPATIENT)
Dept: NEUROLOGY | Facility: CLINIC | Age: 24
End: 2019-05-20

## 2019-05-21 ENCOUNTER — TELEPHONE (OUTPATIENT)
Dept: NEUROLOGY | Facility: CLINIC | Age: 24
End: 2019-05-21

## 2019-06-11 DIAGNOSIS — G43.709 CHRONIC MIGRAINE WITHOUT AURA WITHOUT STATUS MIGRAINOSUS, NOT INTRACTABLE: ICD-10-CM

## 2019-06-13 RX ORDER — LAMOTRIGINE 100 MG/1
100 TABLET ORAL
Qty: 30 TABLET | Refills: 4 | Status: SHIPPED | OUTPATIENT
Start: 2019-06-13 | End: 2019-11-06 | Stop reason: SDUPTHER

## 2019-06-19 DIAGNOSIS — G43.709 CHRONIC MIGRAINE WITHOUT AURA WITHOUT STATUS MIGRAINOSUS, NOT INTRACTABLE: ICD-10-CM

## 2019-06-19 RX ORDER — LAMOTRIGINE 25 MG/1
TABLET ORAL
Qty: 60 TABLET | Refills: 3 | Status: SHIPPED | OUTPATIENT
Start: 2019-06-19 | End: 2019-10-11 | Stop reason: SDUPTHER

## 2019-06-20 ENCOUNTER — TELEPHONE (OUTPATIENT)
Dept: NEUROLOGY | Facility: CLINIC | Age: 24
End: 2019-06-20

## 2019-07-10 ENCOUNTER — TELEPHONE (OUTPATIENT)
Dept: INTERNAL MEDICINE CLINIC | Facility: CLINIC | Age: 24
End: 2019-07-10

## 2019-07-10 NOTE — TELEPHONE ENCOUNTER
Requesting flu shot? Wanted to know if we have any here? 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     Please advise

## 2019-09-05 NOTE — PROGRESS NOTES
[facility]  PATIENT:  Bruno Epley  MRN:  [id number]  :  1995  DATE OF SERVICE:  2019      Assessment/Plan:      Problem List Items Addressed This Visit        Cardiovascular and Mediastinum    Migraine, unspecified, not intractable, without status migrainosus - Primary         Migraine without aura without status migrainosus, not intractable  Preventative:  - Magnesium 400 mg two tabs in am now, continue taking vitamin B 2 200 mg in am and 200 mg pm    - Emgality every 30 days  - Lamictal 50 mg in am and 100 mg in pm    - Increase Cymbalta to 40 mg in am now  Abortive:  - the onset of her migraine headache with pain intensity is 4 to 5/10 patient is to take Excedrin 1 tab  That night if patient still has a little bit of a headache she should take olanzapine 5 mg  If she gets up the next day and still has a mild headache she should take Decadron 1 mg with food  Repeat the cycle if headache persist      Follow-up in a year,If patient continues to do well consider weaning patient off of Cymbalta and then Lamictal         History of Present Illness: We had the pleasure of evaluating Colette Wray in neurological follow up today  As you know she is a 18 year old right handed female  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[place]  She is here today for evaluation of her headaches   Will be graduation in 2019 and then will start looking for a job in [place]       Past medical history:  Migraine headaches  Situational anxiety  Previous hospitalization:Was hospitalized in [place] 2018 for headache infusion        Migraine headaches without aura:   PREVENTIVE used:  - cyproheptadine 4 mg   -  magnesium 200mg in am, B2, Mag infusion,  - amitriptyline, venlafaxine (sedating),  Zoloft ( did help with headaches but caused anxiety), Cymbalta  - zonegran,   - Aimovig, Emaglity  ABORTIVE used:   - Excedrin (works best) less then 3 times a week  - Advil, IV ketorolac- not effective, dexamethasone- effective (uses it once a month), Toradol po (did not help)  - Sumatriptan (headache comes back in two hours), rizatriptan (did not work), Martinez Baas (did not work)  - olanzapine- effective,   - Compazine (did not help)  - Benadryl       What is your current pain level: 0/10     How often do the headaches occur - 1-2 a month and is not sure what really helped as she is now taking all three meds     Are you headache free? yes  Do get Aura/warning sign prior to getting headaches? irritability     What time of the day headache start: anytime of the day     How long do the headaches last -  She is now noted that just sleep will get rid of her headache     Where are they located - frontal,     What is the intensity of pain -average pain  Now is 7/10     Describe your usual headache - Pressure, Throbbing, sharp pain     Associated symptoms:   - nausea, decrease appetite,  -  photophobia, phonophobia, sensitive to smells,  - blurred vision,   -  Problem with concentration,  -  light-headed or dizzy, stiff or sore neck,  -  prefer to be alone and in a dark room, unable to work      Non-Medical/Alternative Treatments used in the past for headaches: chiropractor  Headache trigger: Winter time, watching TV without glasses,    Have you used CBD or THC for your headaches and how often? No  Are you current pregnant or planning on getting pregnant? No    Have you ever had any Brain imaging? yes  I personally reviewed these images  Recent laboratory data was reviewed  Medications and allergies were reviewed  - Reviewed old notes from physician seen in the past- see above HPI for summary of previous encounters  12/30/2014-MRI brain without contrast  FINDINGS-   BRAIN PARENCHYMA-  There is no discrete mass, mass effect or midline shift  No abnormal white matter signal identified   Brainstem and cerebellum demonstrate normal signal   Cerebellar tonsils terminate in normal position above the level of the foramen magnum  There is no intracranial hemorrhage  There is no evidence of acute infarction and diffusion imaging is unremarkable  VENTRICLES-  The ventricles are normal in size and contour  SELLA AND PITUITARY GLAND-  Normal    ORBITS-  Normal    PARANASAL SINUSES-  There is a 25 mm retention cyst in the left maxillary antrum  Sinuses are otherwise clear  VASCULATURE-  Evaluation of the major intracranial vasculature demonstrates appropriate flow voids  CALVARIUM AND SKULL BASE-  Normal    EXTRACRANIAL SOFT TISSUES-  Normal    IMPRESSION-   No intracranial MR abnormality to account for the patient's symptoms  Left maxillary sinus retention cyst    Past Medical History:   Diagnosis Date    Migraine        Patient Active Problem List   Diagnosis    Migraine, unspecified, not intractable, without status migrainosus    Anxiety    Encounter for gynecological examination (general) (routine) without abnormal findings       Medications:      Current Outpatient Medications   Medication Sig Dispense Refill    aspirin-acetaminophen-caffeine (EXCEDRIN MIGRAINE) 250-250-65 MG per tablet Take 1 tablet by mouth as needed for headaches      Cyanocobalamin (VITAMIN B-12 PO) Take 1 tablet by mouth daily      dexamethasone (DECADRON) 1 mg tablet Take 1 tablet (1 mg total) by mouth 2 (two) times a day with meals (Patient taking differently: Take 1 mg by mouth as needed ) 10 tablet 1    DULoxetine (CYMBALTA) 20 mg capsule Two tabs in am daily 60 capsule 3    Galcanezumab-gnlm (EMGALITY) 120 MG/ML SOAJ Inject 1 pen under the skin every 30 (thirty) days 1 pen 11    lamoTRIgine (LaMICtal) 100 mg tablet Take 1 tablet (100 mg total) by mouth daily at bedtime 30 tablet 4    lamoTRIgine (LaMICtal) 25 mg tablet 2 tablets (50mg) daily in the morning   60 tablet 3    MAGNESIUM PO Take 100 mg by mouth daily      Riboflavin (VITAMIN B-2 PO) Take 1 tablet by mouth daily      OLANZapine (ZyPREXA) 5 mg tablet Take 1 tablet (5 mg total) by mouth daily at bedtime as needed (migraine) (Patient not taking: Reported on 9/6/2019) 10 tablet 0     No current facility-administered medications for this visit  Allergies:       Allergies   Allergen Reactions    Lactose Intolerance (Gi)        Family History:     Family History   Problem Relation Age of Onset    Arrhythmia Mother        Social History:     Social History     Socioeconomic History    Marital status: /Civil Union     Spouse name: Not on file    Number of children: Not on file    Years of education: Not on file    Highest education level: Not on file   Occupational History    Not on file   Social Needs    Financial resource strain: Not on file    Food insecurity:     Worry: Not on file     Inability: Not on file    Transportation needs:     Medical: Not on file     Non-medical: Not on file   Tobacco Use    Smoking status: Never Smoker    Smokeless tobacco: Never Used   Substance and Sexual Activity    Alcohol use: Yes     Comment: socially    Drug use: No    Sexual activity: Yes     Partners: Male     Birth control/protection: None   Lifestyle    Physical activity:     Days per week: Not on file     Minutes per session: Not on file    Stress: Not on file   Relationships    Social connections:     Talks on phone: Not on file     Gets together: Not on file     Attends [employer] service: Not on file     Active member of club or organization: Not on file     Attends meetings of clubs or organizations: Not on file     Relationship status: Not on file    Intimate partner violence:     Fear of current or ex partner: Not on file     Emotionally abused: Not on file     Physically abused: Not on file     Forced sexual activity: Not on file   Other Topics Concern    Not on file   Social History Narrative    Not on file         Objective:   Physical Exam:                                                                   Vitals:            /79 (BP Location: Left arm, Patient Position: Sitting, Cuff Size: Standard)   Pulse 68   Ht 5' 5" (1 651 m)   Wt 67 3 kg (148 lb 6 4 oz)   BMI 24 70 kg/m²   BP Readings from Last 3 Encounters:   09/06/19 123/79   05/15/19 127/74   03/06/19 138/78     Pulse Readings from Last 3 Encounters:   09/06/19 68   05/15/19 68   02/21/19 66            CONSTITUTIONAL: Well developed, well nourished, well groomed  No dysmorphic features  Eyes:  PERRLA, EOM normal      Neck:  Normal ROM, neck supple  HEENT:  Normocephalic atraumatic  No meningismus  Oropharynx is clear and moist  No oral mucosal lesions  Chest:  Respirations regular and unlabored  Cardiovascular:  Distal extremities warm without palpable edema or tenderness, no observed significant swelling  Musculoskeletal:  Full range of motion  Skin:  warm and dry   Psychiatric:  Normal behavior and appropriate affect        Neurological Examination:   Mental status/cognitive function: Orientated to time, place and person  Cranial Nerves: 2 to 12 intact  Motor Exam:    5/5 upper extremity  5/5 lower extremity  Sensory: grossly intact light touch in all extremities  Reflexes:   2/4 upper extremity  2/4 lower extremity  Coordination: Finger to nose intact bilaterally, no tremor noted  Gait: steady casual  gait, able to do tandem gait, romberg negative  Review of Systems:   Review of Systems   Constitutional: Negative  HENT: Negative  Eyes: Negative  Respiratory: Negative  Cardiovascular: Negative  Gastrointestinal: Negative  Endocrine: Negative  Genitourinary: Negative  Musculoskeletal: Negative  Skin: Negative  Allergic/Immunologic: Negative  Neurological: Positive for headaches  Hematological: Negative  Psychiatric/Behavioral: Negative          I personally reviewed and updated the ROS that was entered by the medical assistant       I have spent 30 minutes with Patient  today in which greater than 50% of this time was spent in counseling/coordination of care regarding Diagnostic results, Prognosis, Risks and benefits of tx options, Intructions for management, Patient and family education, Importance of tx compliance, Risk factor reductions, Impressions and plan of care as above          Rakesh Kincaid MD 9/6/2019 9:34 AM

## 2019-09-06 ENCOUNTER — OFFICE VISIT (OUTPATIENT)
Dept: NEUROLOGY | Facility: CLINIC | Age: 24
End: 2019-09-06
Payer: COMMERCIAL

## 2019-09-06 VITALS
WEIGHT: 148.4 LBS | SYSTOLIC BLOOD PRESSURE: 123 MMHG | HEIGHT: 65 IN | DIASTOLIC BLOOD PRESSURE: 79 MMHG | BODY MASS INDEX: 24.72 KG/M2 | HEART RATE: 68 BPM

## 2019-09-06 DIAGNOSIS — G43.009 MIGRAINE WITHOUT AURA AND WITHOUT STATUS MIGRAINOSUS, NOT INTRACTABLE: Primary | ICD-10-CM

## 2019-09-06 PROCEDURE — 99214 OFFICE O/P EST MOD 30 MIN: CPT | Performed by: PSYCHIATRY & NEUROLOGY

## 2019-09-09 DIAGNOSIS — G43.709 CHRONIC MIGRAINE WITHOUT AURA WITHOUT STATUS MIGRAINOSUS, NOT INTRACTABLE: ICD-10-CM

## 2019-09-09 RX ORDER — DULOXETIN HYDROCHLORIDE 20 MG/1
CAPSULE, DELAYED RELEASE ORAL
Qty: 60 CAPSULE | Refills: 3 | Status: SHIPPED | OUTPATIENT
Start: 2019-09-09 | End: 2020-01-09 | Stop reason: SDUPTHER

## 2019-10-11 DIAGNOSIS — G43.709 CHRONIC MIGRAINE WITHOUT AURA WITHOUT STATUS MIGRAINOSUS, NOT INTRACTABLE: ICD-10-CM

## 2019-10-11 RX ORDER — LAMOTRIGINE 25 MG/1
TABLET ORAL
Qty: 60 TABLET | Refills: 6 | Status: SHIPPED | OUTPATIENT
Start: 2019-10-11 | End: 2021-04-01

## 2019-10-11 NOTE — TELEPHONE ENCOUNTER
Medication refill check list    Correct patient? yes   Correct medication name, dose, and pill size? yes   Correct provider? yes   Last and Next appt  scheduled? Yes, last date 09/06/19 & next date 09/04/20   Right pharmacy listed? yes   Correct quantity for 30 or 90 days? yes   Is the patient out of refills? When was it last prescribed? Yes, last date 06/19/19   Directions match what the patient says they are taking?  yes   Enough refills? (none for controlled substances, 1 year for routine medications) yes       Patient called in a refill on our med refill line for DULoxetine (CYMBALTA) 20 mg capsule but that was filled on 09/09/19 w/3 refills, will notify patient and for lamoTRIgine (LaMICtal) 25 mg t

## 2019-11-06 DIAGNOSIS — G43.709 CHRONIC MIGRAINE WITHOUT AURA WITHOUT STATUS MIGRAINOSUS, NOT INTRACTABLE: ICD-10-CM

## 2019-11-06 RX ORDER — LAMOTRIGINE 100 MG/1
100 TABLET ORAL
Qty: 30 TABLET | Refills: 4 | Status: SHIPPED | OUTPATIENT
Start: 2019-11-06 | End: 2021-04-01

## 2020-01-09 DIAGNOSIS — G43.709 CHRONIC MIGRAINE WITHOUT AURA WITHOUT STATUS MIGRAINOSUS, NOT INTRACTABLE: ICD-10-CM

## 2020-01-09 RX ORDER — DULOXETIN HYDROCHLORIDE 20 MG/1
CAPSULE, DELAYED RELEASE ORAL
Qty: 60 CAPSULE | Refills: 3 | Status: SHIPPED | OUTPATIENT
Start: 2020-01-09 | End: 2020-05-08

## 2020-01-09 NOTE — TELEPHONE ENCOUNTER
Patient requesting cymbalta and emgality refills be sent to [facility]

## 2020-02-04 ENCOUNTER — TELEPHONE (OUTPATIENT)
Dept: NEUROLOGY | Facility: CLINIC | Age: 25
End: 2020-02-04

## 2020-02-04 NOTE — TELEPHONE ENCOUNTER
Spoke to patient  50mg of lamotrigine in the morning and 100mg of lamotrigine at bedtime  Patient agreeable to weaning instructions  Will notify us if she needs anything further

## 2020-02-04 NOTE — TELEPHONE ENCOUNTER
Patient called with medication related questions  She is interested in weaning off some headache medications - states you mentioned this at last appt  Patient would like to continue emgality  Wean off of cymbalta and lamotrigine if you are agreeable  Patient states she is currently experiencing only 2 migraines per month and feels comfortable attempting wean  Just looking for your recommendations  Please advise

## 2020-02-04 NOTE — TELEPHONE ENCOUNTER
Could you please ask her what dose of Lamictal she is taking? I have 50 the morning and 100 at bedtime  If that is the case patient is to do 100 mg at bedtime for 7 days then 50 mg at bedtime for 7 days then stop  Would she be okay with continuing Cymbalta for now  If no headache change with stopping Lamictal then maybe we can consider coming off of Cymbalta upon follow-up    Thank you

## 2020-05-07 DIAGNOSIS — G43.709 CHRONIC MIGRAINE WITHOUT AURA WITHOUT STATUS MIGRAINOSUS, NOT INTRACTABLE: ICD-10-CM

## 2020-05-08 RX ORDER — DULOXETIN HYDROCHLORIDE 20 MG/1
CAPSULE, DELAYED RELEASE ORAL
Qty: 60 CAPSULE | Refills: 0 | Status: SHIPPED | OUTPATIENT
Start: 2020-05-08 | End: 2020-06-10

## 2020-05-21 ENCOUNTER — TELEPHONE (OUTPATIENT)
Dept: NEUROLOGY | Facility: CLINIC | Age: 25
End: 2020-05-21

## 2020-06-09 DIAGNOSIS — G43.709 CHRONIC MIGRAINE WITHOUT AURA WITHOUT STATUS MIGRAINOSUS, NOT INTRACTABLE: ICD-10-CM

## 2020-06-10 RX ORDER — DULOXETIN HYDROCHLORIDE 20 MG/1
CAPSULE, DELAYED RELEASE ORAL
Qty: 60 CAPSULE | Refills: 0 | Status: SHIPPED | OUTPATIENT
Start: 2020-06-10 | End: 2020-07-13

## 2020-07-11 DIAGNOSIS — G43.709 CHRONIC MIGRAINE WITHOUT AURA WITHOUT STATUS MIGRAINOSUS, NOT INTRACTABLE: ICD-10-CM

## 2020-07-13 RX ORDER — DULOXETIN HYDROCHLORIDE 20 MG/1
CAPSULE, DELAYED RELEASE ORAL
Qty: 60 CAPSULE | Refills: 0 | Status: SHIPPED | OUTPATIENT
Start: 2020-07-13 | End: 2020-08-13

## 2020-08-12 DIAGNOSIS — G43.709 CHRONIC MIGRAINE WITHOUT AURA WITHOUT STATUS MIGRAINOSUS, NOT INTRACTABLE: ICD-10-CM

## 2020-08-13 ENCOUNTER — TELEPHONE (OUTPATIENT)
Dept: NEUROLOGY | Facility: CLINIC | Age: 25
End: 2020-08-13

## 2020-08-13 RX ORDER — DULOXETIN HYDROCHLORIDE 20 MG/1
CAPSULE, DELAYED RELEASE ORAL
Qty: 60 CAPSULE | Refills: 0 | Status: SHIPPED | OUTPATIENT
Start: 2020-08-13 | End: 2021-04-01

## 2020-08-13 NOTE — TELEPHONE ENCOUNTER
Called patient and left a message to call back to reschedule 09/04/20 appointment due to Dr Donta Bautista being out of the office  I did let her know that we could get her in with Ashley Faulkner the week before or place her in Dr Elroy Keller first available and then put on the wait list  If patient calls back please reschedule appointment

## 2020-08-25 ENCOUNTER — TELEPHONE (OUTPATIENT)
Dept: NEUROLOGY | Facility: CLINIC | Age: 25
End: 2020-08-25

## 2020-08-25 NOTE — TELEPHONE ENCOUNTER
pt called and states that she is doing well with her migraines  she is currently taking emgality and duloxetine  she is asking if she can wean off of duloxetine  getting mirgraines 1-2 month and are very manageable  she would prefer to be on less medication and try on ly emgality      duloxetine 20mg 2 cap daily  please advise   [phone] to leave a detailed message

## 2020-08-26 NOTE — TELEPHONE ENCOUNTER
Have patient take Cymbalta 20 mg daily for 2 weeks, then every other day for 2 weeks then stop    Thank you

## 2020-08-28 NOTE — TELEPHONE ENCOUNTER
Called patient and reviewed below with her  She verbalized understanding and denied questions at this time

## 2020-10-28 ENCOUNTER — CLINICAL SUPPORT (OUTPATIENT)
Dept: INTERNAL MEDICINE CLINIC | Facility: CLINIC | Age: 25
End: 2020-10-28
Payer: COMMERCIAL

## 2020-10-28 DIAGNOSIS — Z23 NEED FOR INFLUENZA VACCINATION: Primary | ICD-10-CM

## 2020-10-28 PROCEDURE — 90686 IIV4 VACC NO PRSV 0.5 ML IM: CPT

## 2020-10-28 PROCEDURE — 90471 IMMUNIZATION ADMIN: CPT

## 2020-11-19 ENCOUNTER — TELEPHONE (OUTPATIENT)
Dept: NEUROLOGY | Facility: CLINIC | Age: 25
End: 2020-11-19

## 2020-11-19 DIAGNOSIS — G43.709 CHRONIC MIGRAINE WITHOUT AURA WITHOUT STATUS MIGRAINOSUS, NOT INTRACTABLE: ICD-10-CM

## 2020-11-20 RX ORDER — GALCANEZUMAB 120 MG/ML
1 INJECTION, SOLUTION SUBCUTANEOUS
Qty: 1 PEN | Refills: 11 | Status: SHIPPED | OUTPATIENT
Start: 2020-11-20 | End: 2020-11-23 | Stop reason: SDUPTHER

## 2020-11-23 DIAGNOSIS — G43.709 CHRONIC MIGRAINE WITHOUT AURA WITHOUT STATUS MIGRAINOSUS, NOT INTRACTABLE: ICD-10-CM

## 2020-11-24 RX ORDER — GALCANEZUMAB 120 MG/ML
1 INJECTION, SOLUTION SUBCUTANEOUS
Qty: 1 PEN | Refills: 11 | Status: SHIPPED | OUTPATIENT
Start: 2020-11-24 | End: 2021-06-29 | Stop reason: SDUPTHER

## 2020-12-23 ENCOUNTER — TELEPHONE (OUTPATIENT)
Dept: NEUROLOGY | Facility: CLINIC | Age: 25
End: 2020-12-23

## 2021-02-08 ENCOUNTER — TELEPHONE (OUTPATIENT)
Dept: NEUROLOGY | Facility: CLINIC | Age: 26
End: 2021-02-08

## 2021-02-08 NOTE — TELEPHONE ENCOUNTER
pt called and states that emgality needs a new PA    currently haing 3-4 migraines a month   prior to starting she was having 15-20 migraine days a month      she is asking for a call back with determination  [phone] to leave detailed message    PA completed on [facility]

## 2021-02-10 NOTE — TELEPHONE ENCOUNTER
emgality approved for 6 months from 2/9/21  Left detailed message for pt making her aware of approval and to contact pharm

## 2021-03-18 NOTE — PROGRESS NOTES
Patient ID: Sofia Galeano is a 20 y o  female  Assessment/Plan:    Chronic migraine without aura without status migrainosus, not intractable  Preventative:  Apply for Aimovig  If Cris Breckenridge is not effective (or denied) to apply for Botox as has tried and failed multiple medications    Abortive:   at onset of migraine take rizariptan  May repeat in 2 hours if needed  In addition, it may try prochlorperazine to help break your migraine  Would recommend trying this if needed to repeat the 2nd dose or need to take the dexamethasone           Problem List Items Addressed This Visit        Cardiovascular and Mediastinum    Chronic migraine without aura without status migrainosus, not intractable - Primary     Preventative:  Apply for Aimovig  If Cris Breckenridge is not effective (or denied) to apply for Botox as has tried and failed multiple medications    Abortive:   at onset of migraine take rizariptan  May repeat in 2 hours if needed  In addition, it may try prochlorperazine to help break your migraine  Would recommend trying this if needed to repeat the 2nd dose or need to take the dexamethasone           Relevant Medications    aspirin-acetaminophen-caffeine (EXCEDRIN MIGRAINE) 250-250-65 MG per tablet    rizatriptan (MAXALT) 10 MG tablet             Subjective:    HPI  We had the pleasure of evaluating Pearl Wray in neurological follow up today  As you know she is a 20year old right handed female  She goes to  school in [place]   She is here today for evaluation of her headaches       Chronic migraine headaches:   PREVENTIVE used: mag- caused headaches, B2, amitriptyline, venlafaxine (sedating),  zonegran, Zoloft ( did help with headaches but caused anxiety)  ABORTIVE used: Excedrin, Advil, Sumatriptan, IV ketorolac- not effective, dexamethasone- effective, olanzapine- effective, Compazine  Non-Medical/Alternative Treatments used in the past for headaches: chiropractor  Headache trigger: Winter time, watching TV without glasses, Possibly one trigger is alcohol, as she usually gets a migraine 2-3 days after consuming alcohol  Aura: none     How often do the headaches occur - can be variable 4-5/week, but still has 2-3/week on a good week and tend to get worse in the fall and in winter  She has >15+ migraine days a month  What time of the day do the headaches start - anytime  How long do the headaches last - all day or until she takes Sumatriptan which helps most of the time but 50% of the time, they will occur again the next day and that is when she has to take the Decadron  Where are they located - frontal, orbits, at time they radiates to the back of her head  What is the intensity of pain -average pain level 6/10, up to 9-10/10  Describe your usual headache - Pressure, Throbbing, sharp pain  Associated symptoms: nausea, decrease appetite, photophobia, blurred vision, phonophobia, sensitive to smells, Problem with concentration, light-headed or dizzy, stiff or sore neck, prefer to be alone and in a dark room, unable to work           The following portions of the patient's history were reviewed and updated as appropriate: allergies, current medications, past family history, past medical history, past social history, past surgical history and problem list          Objective:    Blood pressure 123/66, pulse 73, height 5' 5" (1 651 m), weight 61 3 kg (135 lb 1 6 oz)  Physical Exam   Constitutional: She appears well-developed and well-nourished  HENT:   Head: Normocephalic and atraumatic  Eyes: EOM are normal  Pupils are equal, round, and reactive to light  Neck: Normal range of motion  Cardiovascular: Normal rate  Pulmonary/Chest: Effort normal    Musculoskeletal: Normal range of motion  Neurological: She has normal strength and normal reflexes  Gait and coordination normal    Skin: Skin is warm and dry  Psychiatric: She has a normal mood and affect   Her speech is normal  Nursing note and vitals reviewed  Neurological Exam    Mental Status  The patient is alert and oriented to person, place, time, and situation  Her recent and remote memory are normal  She has no visuospatial neglect  Her speech is normal  Her language is fluent with no aphasia  She has normal attention span and concentration  She has a normal fund of knowledge  Cranial Nerves    CN II: The patient's visual acuity and visual fields are normal   CN III, IV, VI: The patient's pupils are equally round and reactive to light and ocular movements are normal   CN V: The patient has normal facial sensation  CN VII:  The patient has symmetric facial movement  CN VIII:  The patient's hearing is normal   CN IX, X: The patient has symmetric palate movement and normal gag reflex  CN XI: The patient's shoulder shrug strength is normal   CN XII: The patient's tongue is midline without atrophy or fasciculations  Motor  The patient has normal muscle bulk throughout  Her overall muscle tone is normal throughout  Her strength is 5/5 throughout all four extremities  Sensory  The patient's sensation is normal in all four extremities  She has normal cortical sensation    Reflexes  Deep tendon reflexes are 2+ and symmetric in all four extremities with downgoing toes bilaterally  Gait and Coordination  The patient has normal gait and station and normal casual, toe, heel, and tandem gait  She has normal coordination bilaterally  ROS:    Review of Systems   Constitutional: Negative  HENT: Negative  Eyes: Negative  Respiratory: Negative  Cardiovascular: Negative  Gastrointestinal: Negative  Endocrine: Negative  Genitourinary: Negative  Musculoskeletal: Negative  Skin: Negative  Allergic/Immunologic: Negative  Neurological: Positive for headaches  Hematological: Negative  Psychiatric/Behavioral: Negative  complains of pain/discomfort

## 2021-03-31 NOTE — ASSESSMENT & PLAN NOTE
Normal findings on routine annual gyn exam  Recommended monthly SBE, annual CBE  Reviewed ASCCP guidelines and pap collected today  The patient reports she has completed Gardasil series and COVID vaccines    STI testing was offered and declined at this time; the patient is aware that condoms are recommended for all sexual contact for prevention of STI  The patient is using condoms for cotnraception  Reviewed diet/activity recommendations and reasons to call  F/u in one year for routine annual gyn exam or sooner PRN

## 2021-04-01 ENCOUNTER — ANNUAL EXAM (OUTPATIENT)
Dept: OBGYN CLINIC | Facility: CLINIC | Age: 26
End: 2021-04-01
Payer: COMMERCIAL

## 2021-04-01 VITALS
BODY MASS INDEX: 23.27 KG/M2 | WEIGHT: 144.8 LBS | SYSTOLIC BLOOD PRESSURE: 118 MMHG | DIASTOLIC BLOOD PRESSURE: 76 MMHG | HEIGHT: 66 IN

## 2021-04-01 DIAGNOSIS — Z01.419 ENCOUNTER FOR GYNECOLOGICAL EXAMINATION (GENERAL) (ROUTINE) WITHOUT ABNORMAL FINDINGS: Primary | ICD-10-CM

## 2021-04-01 PROCEDURE — S0612 ANNUAL GYNECOLOGICAL EXAMINA: HCPCS | Performed by: NURSE PRACTITIONER

## 2021-04-01 PROCEDURE — G0124 SCREEN C/V THIN LAYER BY MD: HCPCS | Performed by: PATHOLOGY

## 2021-04-01 PROCEDURE — G0145 SCR C/V CYTO,THINLAYER,RESCR: HCPCS | Performed by: PATHOLOGY

## 2021-04-01 NOTE — PROGRESS NOTES
Encounter for gynecological examination (general) (routine) without abnormal findings  Normal findings on routine annual gyn exam  Recommended monthly SBE, annual CBE  Reviewed ASCCP guidelines and pap collected today  The patient reports she has completed Gardasil series and COVID vaccines    STI testing was offered and declined at this time; the patient is aware that condoms are recommended for all sexual contact for prevention of STI  The patient is using condoms for cotnraception  Reviewed diet/activity recommendations and reasons to call  F/u in one year for routine annual gyn exam or sooner PRN  Diagnoses and all orders for this visit:    Encounter for gynecological examination (general) (routine) without abnormal findings  -     Liquid-based pap, screening         Lacey Rojas  1995    CC:  Yearly exam    S:  Lacey Rojas is a 31 y o  female here for yearly exam  She denies breast concerns, abdominal/pelvic pain, abnormal vaginal discharge or bladder/bowel dysfunction  Her cycles are regular, not heavy or painful  Sexual activity: She is sexually active without pain, bleeding or dryness  Contraception:  She uses condoms  for contraception  STD testing:  She does not request STD testing today  Gardasil:  She has had the Gardasil series         Last Pap: 1/15/18 negative    Family hx of breast cancer:  PGM 76s  Family hx of ovarian cancer: denies  Family hx of colon cancer: maternal uncle in 45s     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       Current Outpatient Medications:     aspirin-acetaminophen-caffeine (EXCEDRIN MIGRAINE) 250-250-65 MG per tablet, Take 1 tablet by mouth as needed for headaches, Disp: , Rfl:     Galcanezumab-gnlm (Emgality) 120 MG/ML SOAJ, Inject 1 pen under the skin every 30 (thirty) days, Disp: 1 pen, Rfl: 11    MAGNESIUM PO, Take 100 mg by mouth daily, Disp: , Rfl:     Cyanocobalamin (VITAMIN B-12 PO), Take 1 tablet by mouth daily, Disp: , Rfl:     Riboflavin (VITAMIN B-2 PO), Take 1 tablet by mouth daily, Disp: , Rfl:   Social History     Socioeconomic History    Marital status: /Civil Union     Spouse name: Not on file    Number of children: Not on file    Years of education: Not on file    Highest education level: Not on file   Occupational History    Not on file   Social Needs    Financial resource strain: Not on file    Food insecurity     Worry: Not on file     Inability: Not on file   [employer] needs     Medical: Not on file     Non-medical: Not on file   Tobacco Use    Smoking status: Never Smoker    Smokeless tobacco: Never Used   Substance and Sexual Activity    Alcohol use: Yes     Comment: socially    Drug use: No    Sexual activity: Yes     Partners: Male     Birth control/protection: None   Lifestyle    Physical activity     Days per week: Not on file     Minutes per session: Not on file    Stress: Not on file   Relationships    Social connections     Talks on phone: Not on file     Gets together: Not on file     Attends Latter day service: Not on file     Active member of club or organization: Not on file     Attends meetings of clubs or organizations: Not on file     Relationship status: Not on file    Intimate partner violence     Fear of current or ex partner: Not on file     Emotionally abused: Not on file     Physically abused: Not on file     Forced sexual activity: Not on file   Other Topics Concern    Not on file   Social History Narrative    Not on file     Family History   Problem Relation Age of Onset    Arrhythmia Mother     Hyperlipidemia Father     No Known Problems Maternal Grandmother     Alzheimer's disease Maternal Grandfather     Breast cancer Paternal Grandmother     Dementia Paternal Grandfather     Colon cancer Maternal Uncle      Past Medical History:   Diagnosis Date    Migraine      Review of Systems   Constitutional: Negative for appetite change, fatigue and unexpected weight change  Respiratory: Negative for shortness of breath  Cardiovascular: Negative for chest pain and leg swelling  Breasts:  negative for tenderness or masses  Gastrointestinal: Negative for abdominal pain  Endocrine: Negative for cold intolerance and heat intolerance  Genitourinary: Negative for dyspareunia, dysuria, flank pain, frequency, genital sores, hematuria, menstrual problem and pelvic pain  Musculoskeletal: Negative for back pain  Neurological: Negative for headaches  O:  Blood pressure 118/76, height 5' 6" (1 676 m), weight 65 7 kg (144 lb 12 8 oz), last menstrual period 03/13/2021  Patient appears well and is not in distress  ROM normal   Neck is supple without masses  Normal thyroid  Heart regular rate and rhythm  Lungs CTA bilaterally   Breasts are symmetrical without mass, tenderness, nipple discharge, skin changes or adenopathy  Abdomen is soft and nontender without masses  External genitals are normal without lesions or rashes  Urethral meatus and urethra are normal  Bladder is normal to palpation  Vagina is normal without discharge or bleeding  Cervix is normal without discharge or lesion  Uterus is normal, mobile, nontender without palpable mass  Adnexa are normal, nontender, without palpable mass     Skin warm and dry  Capillary refill < 2 seconds  Alert and oriented x 3 with normal affect

## 2021-04-08 LAB
LAB AP GYN PRIMARY INTERPRETATION: NORMAL
Lab: NORMAL
PATH INTERP SPEC-IMP: NORMAL

## 2021-04-13 ENCOUNTER — TELEPHONE (OUTPATIENT)
Dept: OBGYN CLINIC | Facility: MEDICAL CENTER | Age: 26
End: 2021-04-13

## 2021-05-24 ENCOUNTER — TRANSCRIBE ORDERS (OUTPATIENT)
Dept: URGENT CARE | Facility: CLINIC | Age: 26
End: 2021-05-24

## 2021-05-24 ENCOUNTER — APPOINTMENT (OUTPATIENT)
Dept: URGENT CARE | Facility: CLINIC | Age: 26
End: 2021-05-24

## 2021-05-24 DIAGNOSIS — Z00.00 PHYSICAL EXAM: Primary | ICD-10-CM

## 2021-05-24 DIAGNOSIS — Z00.00 PHYSICAL EXAM: ICD-10-CM

## 2021-05-24 PROCEDURE — 86480 TB TEST CELL IMMUN MEASURE: CPT

## 2021-05-27 LAB
GAMMA INTERFERON BACKGROUND BLD IA-ACNC: 0.13 IU/ML
M TB IFN-G BLD-IMP: NEGATIVE
M TB IFN-G CD4+ BCKGRND COR BLD-ACNC: -0.02 IU/ML
M TB IFN-G CD4+ BCKGRND COR BLD-ACNC: -0.05 IU/ML
MITOGEN IGNF BCKGRD COR BLD-ACNC: >10 IU/ML

## 2021-06-29 DIAGNOSIS — G43.709 CHRONIC MIGRAINE WITHOUT AURA WITHOUT STATUS MIGRAINOSUS, NOT INTRACTABLE: ICD-10-CM

## 2021-06-29 RX ORDER — GALCANEZUMAB 120 MG/ML
1 INJECTION, SOLUTION SUBCUTANEOUS
Qty: 1 ML | Refills: 2 | Status: SHIPPED | OUTPATIENT
Start: 2021-06-29 | End: 2021-08-02 | Stop reason: SDUPTHER

## 2021-06-29 NOTE — TELEPHONE ENCOUNTER
pt called asking for emgality prescription be sent to [facility] as she is now a  employee  pt previously saw dr Jj Perez and has not been seen since 2019  I scheduled pt to see you  Refill entered    Please sign off

## 2021-06-30 ENCOUNTER — TELEPHONE (OUTPATIENT)
Dept: NEUROLOGY | Facility: CLINIC | Age: 26
End: 2021-06-30

## 2021-07-20 ENCOUNTER — TELEPHONE (OUTPATIENT)
Dept: NEUROLOGY | Facility: CLINIC | Age: 26
End: 2021-07-20

## 2021-07-20 NOTE — TELEPHONE ENCOUNTER
Called and spoke to patient's  Todd Boyer - confirmed upcoming appointment with Dr Yokasta Peña  Provided patient's  with apt date, time and location  Informed patient that check in is at least 15 minutes prior to apt time

## 2021-07-20 NOTE — TELEPHONE ENCOUNTER
Hi!     If anyone cancels on 8/2/21, please do not add any patients on this day  Please make Tracie's apt for 60 minutes as she has not seen Dr Tracie Tipton before and has not been seen for almost 2 years  Thank you!     Rosario

## 2021-08-02 ENCOUNTER — OFFICE VISIT (OUTPATIENT)
Dept: NEUROLOGY | Facility: CLINIC | Age: 26
End: 2021-08-02
Payer: COMMERCIAL

## 2021-08-02 VITALS
BODY MASS INDEX: 23.14 KG/M2 | HEART RATE: 71 BPM | WEIGHT: 144 LBS | DIASTOLIC BLOOD PRESSURE: 81 MMHG | SYSTOLIC BLOOD PRESSURE: 119 MMHG | HEIGHT: 66 IN

## 2021-08-02 DIAGNOSIS — G43.709 CHRONIC MIGRAINE WITHOUT AURA WITHOUT STATUS MIGRAINOSUS, NOT INTRACTABLE: Primary | ICD-10-CM

## 2021-08-02 PROCEDURE — 99215 OFFICE O/P EST HI 40 MIN: CPT | Performed by: PSYCHIATRY & NEUROLOGY

## 2021-08-02 RX ORDER — GALCANEZUMAB 120 MG/ML
1 INJECTION, SOLUTION SUBCUTANEOUS
Qty: 1 ML | Refills: 11 | Status: SHIPPED | OUTPATIENT
Start: 2021-08-02 | End: 2022-05-18 | Stop reason: ALTCHOICE

## 2021-08-02 NOTE — PROGRESS NOTES
[facility] [facility] Consult  PATIENT:  Theresa Perry  MRN:  [id number]  :  1995  DATE OF SERVICE:  2021  REFERRED BY: No ref  provider found  PMD: Melchor Bloch, MD    Assessment/Plan:     Theresa Perry is a delightful 31 y o  female with a past medical history that includes vitamin-D deficiency, anxiety, migraines referred here for evaluation of headache  My initial evaluation 2021     Chronic migraine without aura without status migrainosus, not intractable  She reports a long history of headaches and migraines dating back to around age 20  They were very difficult to control despite multiple medication trials prior to starting emgality injections for which she has had significant improvement  She previously followed with my Neurology colleague Dr Opal Peraza, please see EMR for details  Pain is typically bifrontal with typical associated migrainous features, denies aura  -   In  prior to starting emgality she had over 20 migraine days per month, often nearly daily  - as of 2021: she reports continued improvement on emgality, about 5 migraines a month that are moderate usually and typically respond to ibuprofen  Continue emgality  Workup:  - Neurologic assessment reveals normal neurological exam   - MRI Brain 14:  No intracranial MR abnormality to account for the patient's symptoms  Left maxillary sinus retention cyst     Preventative:  - we discussed headache hygiene and lifestyle factors that may improve headaches  - continue emgality  Discussed proper use, possible side effects and risks   We also discussed that she would want to stop at least 6 months prior to trying to conceive due to the unknowns during pregnancy on this medication  -  Continue magnesium, consider adding riboflavin, vitamin D  - Past/ failed/contraindicated:  Lamictal, duloxetine, cyproheptadine, amitriptyline, venlafaxine, sertraline, zonisamide, aimovig  - future options: botox Abortive:  - discussed not taking over-the-counter or prescription pain medications more than 3 days per week to prevent medication overuse/rebound headache  -   Ibuprofen usually helps, occasionally takes Excedrin but this often causes a rebound headache the next day  -  Past/ helped:  Decadron 2 mg, patient does not recall but per Dr Giancarlo Martins Notes Olanzapine 5 mg also helped as backup, unclear of p o  or IM Toradol  Or Depakote helped or not  - Past/ failed/contraindicated:  IV Toradol, prochlorperazine, sumatriptan, rizatriptan, frovatriptan, diphenhydramine  - future options: Toradol IM or p o , could consider trial for 5 days of Depakote or dexamethasone 4 prolonged migraine, ubrelvy, reyvow, nurtec        Patient instructions      Whenever you do decide to have kids in the future, stop emgality 6 months before trying   Could add 1000 units Vitamin D daily   We discussed considering following up with primary care provider for routine labs, since you have not had routine labs in years  Headache/migraine treatment:   Abortive medications (for immediate treatment of a headache): It is ok to take ibuprofen, acetaminophen or naproxen (Advil, Tylenol,  Aleve, Excedrin) if they help your headaches you should limit these to No more than 3 times a week to avoid medication overuse/rebound headaches         Over the counter preventive supplements for headaches/migraines (if you try, try for 3 months straight)  (to take every day to help prevent headaches - not to take at the time of headache):  (there are combo pills online of these)  [x] Magnesium 400mg daily (If any diarrhea or upset stomach, decrease dose  as tolerated)  [x] Riboflavin (Vitamin B2)  400mg daily (adults) (FYI B2 may make your urine bright/neon yellow)      Prescription preventive medications for headaches/migraines   (to take every day to help prevent headaches - not to take at the time of headache):  [x] continue emgality    Emgality/Galcanezumab -  120 mg injections every 30 days     Keep out of direct sunlight  Prior to administration, allow to come to room temperature for 30 minutes  Do not warm using a heat source (eg, microwave or hot water)  Do not shake  Administer in abdomen (avoiding 2 inches around the navel), thigh, upper arm, or buttocks avoiding areas of skin that are tender, bruised, red or hard  Deliver entire contents of single-use prefilled pen or syringe  *Typically these types of medications take time untill you see the benefit, although some may see improvement in days, often it may take weeks, especially if the medication is being titrated up to a beneficial level  Please contact us if there are any concerns or questions regarding the medication  Lifestyle Recommendations:  [x] SLEEP - Maintain a regular sleep schedule: Adults need at least 7-8 hours of uninterrupted a night  Maintain good sleep hygiene:  Going to bed and waking up at consistent times, avoiding excessive daytime naps, avoiding caffeinated beverages in the evening, avoid excessive stimulation in the evening and generally using bed primarily for sleeping  One hour before bedtime would recommend turning lights down lower, decreasing your activity (may read quietly, listen to music at a low volume)  When you get into bed, should eliminate all technology (no texting, emailing, playing with your phone, iPad or tablet in bed)  [x] HYDRATION - Maintain good hydration  Drink  2L of fluid a day (4 typical small water bottles)  [x] DIET - Maintain good nutrition  In particular don't skip meals and try and eat healthy balanced meals regularly  [x] TRIGGERS - Look for other triggers and avoid them: Limit caffeine to 1-2 cups a day or less  Avoid dietary triggers that you have noticed bring on your headaches (this could include aged cheese, peanuts, MSG, aspartame and nitrates)    [x] EXERCISE - physical exercise as we all know is good for you in many ways, and not only is good for your heart, but also is beneficial for your mental health, cognitive health and  chronic pain/headaches  I would encourage at the least 5 days of physical exercise weekly for at least 30 minutes  Education and Follow-up  [x] Please call with any questions or concerns  Of course if any new concerning symptoms go to the emergency department  [x] Follow up 1 year, sooner if needed         CC: We had the pleasure of evaluating Tamia Gatica in neurological consultation today  Tamia Gatica is a   right handed female who presents today for evaluation of headaches  History obtained from patient as well as available medical record review  History of Present Illness:   Current medical illnesses  or past medical history include anxiety, migraines , vitamin D deficiency     Former patient of Dr Donta Denis  - last saw her 9/6/19  - has been well controlled on emgality      Headaches started at what age? 20years old  How often do the headaches occur?   -before emgality she had over 20 migraine days per month  - as of 8/2/2021: she reports continued improvement on emgality, about 5 a month that are moderate usually and typically respond to ibuprofen   What time of the day do the headaches start? No particular time of day   How long do the headaches last? Over 4 hours, typically improve with sleep, in the pastr up to days    Are you ever headache free? Yes    Aura? without aura     Last eye exam: years ago, but no eye symptoms   - stopped wearing glasses because did not need them    Where is your headache located and pain quality?   - bifrontal pounding throbbing pressure   What is the intensity of pain?  Average: 5-7/10, worst lately worst 7 - past 10/10 - ED infusions years ago   Associated symptoms: a lot of these not anymore   [x] Nausea       [] Vomiting        [] Diarrhea  [x] Problems with concentration  [x] Photophobia  >   [x]Phonophobia      [x] Osmophobia  [] Blurred vision   [x] Prefer quiet, dark room  [] Light-headed or dizzy     [] Tinnitus    [] Hands or feet tingle or feel numb/paresthesias      [] Ptosis      [] Facial droop  [] Lacrimation  [] Nasal congestion/rhinorrhea      Number of days missed per month because of headaches:  Work (or school) days: 0    Headache triggers:  Caffeine    Have you seen someone else for headaches or pain? Yes, Dr Lawson Monday neurology/headache specialist   Have you had trigger point injection performed and how often? No  Have you had Botox injection performed and how often? No   Have you had epidural injections or transforaminal injections performed? No  Are you current pregnant or planning on getting pregnant? No, not for a few years   Have you ever had any Brain imaging? yes     What medications do you take or have you taken for your headaches? ABORTIVE:      Ibuprofen usually helps   exedrin - may have rebound headache next day     Past   Toradol p o  DHE  Depakote  IV Toradol not affected in the past  Dexamethasone a - helped - has not needed in 2 years   olanzapine 5 mg - helped as back up  - has not needed in 2 years  Prochlorperazine/Compazine did not help  Sumatriptan headache came back in 2 hours  Rizatriptan did not help  Frovatriptan did not help  Diphenhydramine/Benadryl    PREVENTIVE:     emgality - no side effects   Magnesium     Past/ failed/contraindicated:  lamictal 50/100 mg - off since maybe around 9/2019  duloxetine/cymbalta 40 mg   - off since maybe around 9/2019  Cyproheptadine 4 mg  Amitriptyline  Venlafaxine side effects of sedating  Sertraline side effects of anxiety  Zonisamide  aimovig       Alternative therapies used in the past for headaches?  Chiropractor     LIFESTYLE  Sleep   - averages: 8 hours   Problems falling asleep?:   No  Problems staying asleep?:  No    Physical activity: walking running on feet at work all day     Water: maybe 20 oz a day   Caffeine: 0 per day    Mood:  anxiety - was during PT school, better now     The following portions of the patient's history were reviewed and updated as appropriate: allergies, current medications, past family history, past medical history, past social history, past surgical history and problem list     Pertinent family history:  Family history of headaches:  migraine headaches in grandmother  Any family history of aneurysms - No    Pertinent social history:  Work: PT   Lives with      Illicit Drugs: denies  Alcohol/tobacco: Denies tobacco use, alcohol intake: social drinker    Past Medical History:     Past Medical History:   Diagnosis Date    Migraine        Patient Active Problem List   Diagnosis    Migraine, unspecified, not intractable, without status migrainosus    Anxiety    Encounter for gynecological examination (general) (routine) without abnormal findings       Medications:      Current Outpatient Medications   Medication Sig Dispense Refill    aspirin-acetaminophen-caffeine (EXCEDRIN MIGRAINE) 250-250-65 MG per tablet Take 1 tablet by mouth as needed for headaches      Galcanezumab-gnlm (Emgality) 120 MG/ML SOAJ Inject 1 pen under the skin every 30 (thirty) days 1 mL 11    MAGNESIUM PO Take 100 mg by mouth daily       No current facility-administered medications for this visit  Allergies:       Allergies   Allergen Reactions    Lactose Intolerance (Gi) - Food Allergy        Family History:     Family History   Problem Relation Age of Onset    Arrhythmia Mother     Hyperlipidemia Father     No Known Problems Maternal Grandmother     Alzheimer's disease Maternal Grandfather     Breast cancer Paternal Grandmother     Dementia Paternal Grandfather     Colon cancer Maternal Uncle        Social History:       Social History     Socioeconomic History    Marital status: /Civil Union     Spouse name: Not on file    Number of children: Not on file    Years of education: Not on file    Highest education level: Not on file   Occupational History    Not on file   Tobacco Use    Smoking status: Never Smoker    Smokeless tobacco: Never Used   Vaping Use    Vaping Use: Never used   Substance and Sexual Activity    Alcohol use: Yes     Comment: socially    Drug use: No    Sexual activity: Yes     Partners: Male     Birth control/protection: None   Other Topics Concern    Not on file   Social History Narrative    Not on file     Social Determinants of Health     Financial Resource Strain:     Difficulty of Paying Living Expenses:    Food Insecurity:     Worried About Running Out of Food in the Last Year:     [address] in the Last Year:    Transportation Needs:     Lack of Transportation (Medical):  Lack of Transportation (Non-Medical):    Physical Activity:     Days of Exercise per Week:     Minutes of Exercise per Session:    Stress:     Feeling of Stress :    Social Connections:     Frequency of Communication with Friends and Family:     Frequency of Social Gatherings with Friends and Family:     Attends [employer] Services:     Active Member of Clubs or Organizations:     Attends Club or Organization Meetings:     Marital Status:    Intimate Partner Violence:     Fear of Current or Ex-Partner:     Emotionally Abused:     Physically Abused:     Sexually Abused:          Objective:       Physical Exam:                                                                 Vitals:            Constitutional:    /81 (BP Location: Left arm, Patient Position: Sitting, Cuff Size: Standard)   Pulse 71   Ht 5' 6" (1 676 m)   Wt 65 3 kg (144 lb)   BMI 23 24 kg/m²   BP Readings from Last 3 Encounters:   08/02/21 119/81   04/01/21 118/76   09/06/19 123/79     Pulse Readings from Last 3 Encounters:   08/02/21 71   09/06/19 68   05/15/19 68         Well developed, well nourished, well groomed  No dysmorphic features  HEENT:  Normocephalic atraumatic     Oropharynx is clear and moist  No oral mucosal lesions  Chest:  Respirations regular and unlabored  Cardiovascular:  Distal extremities warm without palpable edema or tenderness, no observed significant swelling  Musculoskeletal:  (see below under neurologic exam for evaluation of motor function and gait)   Skin:  warm and dry, not diaphoretic  No apparent birthmarks or stigmata of neurocutaneous disease  Psychiatric:  Normal behavior and appropriate affect        Neurological Examination:     Mental status/cognitive function:   Recent and remote memory intact  Attention span and concentration as well as fund of knowledge are appropriate for age  Normal language and spontaneous speech  Cranial Nerves:  II-visual fields full  Fundi poorly visualized due to pupillary constriction  III, IV, VI-Pupils were equal, round, and reactive to light and accomodation  Extraocular movements were full and conjugate without nystagmus  V-facial sensation symmetric  VII-facial expression symmetric, intact forehead wrinkle, strong eye closure, symmetric smile    VIII-hearing grossly intact bilaterally   IX, X-palate elevation symmetric, no dysarthria  XI-shoulder shrug strength intact    XII-tongue protrusion midline  Motor Exam: symmetric bulk and tone throughout, no pronator drift  Power/strength 5/5 bilateral upper and lower extremities, no atrophy, fasciculations or abnormal movements noted  Sensory: grossly intact light touch in all extremities  Reflexes: brachioradialis 2+, biceps 2+, knee 2+, ankle 2+ bilaterally  No ankle clonus  Coordination: Finger nose finger intact bilaterally, no apparent dysmetria, ataxia or tremor noted  Gait: steady casual and tandem gait       Pertinent lab results:   - 02/21/2019:  Vitamin-D 26 2, B12 364  - 11/21/2018 BMP was clipped CO2 20, CBC with WBC 10 18  TSH normal     Imaging: I have personally reviewed imaging and radiology read   - MRI Brain 12/30/14:  No intracranial MR abnormality to account for the patient's symptoms  Left maxillary sinus retention cyst      Review of Systems:   ROS obtained by medical assistant Personally reviewed and updated if indicated  Review of Systems   Constitutional: Negative  Negative for appetite change and fever  HENT: Negative  Negative for hearing loss, tinnitus, trouble swallowing and voice change  Eyes: Negative  Negative for photophobia and pain  Respiratory: Negative  Negative for shortness of breath  Cardiovascular: Negative  Negative for palpitations  Gastrointestinal: Negative  Negative for nausea and vomiting  Endocrine: Negative  Negative for cold intolerance  Genitourinary: Negative  Negative for dysuria, frequency and urgency  Musculoskeletal: Negative  Negative for myalgias and neck pain  Skin: Negative  Negative for rash  Neurological: Positive for headaches (5 per month)  Negative for dizziness, tremors, seizures, syncope, facial asymmetry, speech difficulty, weakness, light-headedness and numbness  Hematological: Negative  Does not bruise/bleed easily  Psychiatric/Behavioral: Negative  Negative for confusion, hallucinations and sleep disturbance  I have spent 30 minutes with Patient today in which greater than 50% of this time was spent in counseling/coordination of care regarding Prognosis, Risks and benefits of tx options, Intructions for management, Patient education, Importance of tx compliance, Risk factor reductions and Impressions  I also spent 15 minutes non face to face for this patient the same day           Author:  George Zacarias MD 8/2/2021 11:12 AM

## 2021-08-02 NOTE — PATIENT INSTRUCTIONS
Whenever you do decide to have kids in the future, stop emgality 6 months before trying   Could add 1000 units Vitamin D daily     Headache/migraine treatment:   Abortive medications (for immediate treatment of a headache): It is ok to take ibuprofen, acetaminophen or naproxen (Advil, Tylenol,  Aleve, Excedrin) if they help your headaches you should limit these to No more than 3 times a week to avoid medication overuse/rebound headaches  Over the counter preventive supplements for headaches/migraines (if you try, try for 3 months straight)  (to take every day to help prevent headaches - not to take at the time of headache):  (there are combo pills online of these)  [x] Magnesium 400mg daily (If any diarrhea or upset stomach, decrease dose  as tolerated)  [x] Riboflavin (Vitamin B2)  400mg daily (adults) (FYI B2 may make your urine bright/neon yellow)      Prescription preventive medications for headaches/migraines   (to take every day to help prevent headaches - not to take at the time of headache):  [x] continue emgality    Emgality/Galcanezumab -  120 mg injections every 30 days     Keep out of direct sunlight  Prior to administration, allow to come to room temperature for 30 minutes  Do not warm using a heat source (eg, microwave or hot water)  Do not shake  Administer in abdomen (avoiding 2 inches around the navel), thigh, upper arm, or buttocks avoiding areas of skin that are tender, bruised, red or hard  Deliver entire contents of single-use prefilled pen or syringe  *Typically these types of medications take time untill you see the benefit, although some may see improvement in days, often it may take weeks, especially if the medication is being titrated up to a beneficial level  Please contact us if there are any concerns or questions regarding the medication  Lifestyle Recommendations:  [x] SLEEP - Maintain a regular sleep schedule: Adults need at least 7-8 hours of uninterrupted a night  Maintain good sleep hygiene:  Going to bed and waking up at consistent times, avoiding excessive daytime naps, avoiding caffeinated beverages in the evening, avoid excessive stimulation in the evening and generally using bed primarily for sleeping  One hour before bedtime would recommend turning lights down lower, decreasing your activity (may read quietly, listen to music at a low volume)  When you get into bed, should eliminate all technology (no texting, emailing, playing with your phone, iPad or tablet in bed)  [x] HYDRATION - Maintain good hydration  Drink  2L of fluid a day (4 typical small water bottles)  [x] DIET - Maintain good nutrition  In particular don't skip meals and try and eat healthy balanced meals regularly  [x] TRIGGERS - Look for other triggers and avoid them: Limit caffeine to 1-2 cups a day or less  Avoid dietary triggers that you have noticed bring on your headaches (this could include aged cheese, peanuts, MSG, aspartame and nitrates)  [x] EXERCISE - physical exercise as we all know is good for you in many ways, and not only is good for your heart, but also is beneficial for your mental health, cognitive health and  chronic pain/headaches  I would encourage at the least 5 days of physical exercise weekly for at least 30 minutes  Education and Follow-up  [x] Please call with any questions or concerns  Of course if any new concerning symptoms go to the emergency department    [x] Follow up 1 year

## 2021-08-02 NOTE — PROGRESS NOTES
Review of Systems   Constitutional: Negative  Negative for appetite change and fever  HENT: Negative  Negative for hearing loss, tinnitus, trouble swallowing and voice change  Eyes: Negative  Negative for photophobia and pain  Respiratory: Negative  Negative for shortness of breath  Cardiovascular: Negative  Negative for palpitations  Gastrointestinal: Negative  Negative for nausea and vomiting  Endocrine: Negative  Negative for cold intolerance  Genitourinary: Negative  Negative for dysuria, frequency and urgency  Musculoskeletal: Negative  Negative for myalgias and neck pain  Skin: Negative  Negative for rash  Neurological: Positive for headaches (5 per month)  Negative for dizziness, tremors, seizures, syncope, facial asymmetry, speech difficulty, weakness, light-headedness and numbness  Hematological: Negative  Does not bruise/bleed easily  Psychiatric/Behavioral: Negative  Negative for confusion, hallucinations and sleep disturbance

## 2021-08-26 ENCOUNTER — APPOINTMENT (OUTPATIENT)
Dept: LAB | Facility: CLINIC | Age: 26
End: 2021-08-26

## 2021-09-24 ENCOUNTER — OFFICE VISIT (OUTPATIENT)
Dept: URGENT CARE | Facility: CLINIC | Age: 26
End: 2021-09-24
Payer: COMMERCIAL

## 2021-09-24 VITALS
HEART RATE: 80 BPM | TEMPERATURE: 97.8 F | HEIGHT: 65 IN | SYSTOLIC BLOOD PRESSURE: 130 MMHG | OXYGEN SATURATION: 99 % | RESPIRATION RATE: 16 BRPM | DIASTOLIC BLOOD PRESSURE: 81 MMHG | BODY MASS INDEX: 23.32 KG/M2 | WEIGHT: 140 LBS

## 2021-09-24 DIAGNOSIS — L25.5 RHUS DERMATITIS: Primary | ICD-10-CM

## 2021-09-24 PROCEDURE — G0382 LEV 3 HOSP TYPE B ED VISIT: HCPCS | Performed by: NURSE PRACTITIONER

## 2021-09-24 RX ORDER — PREDNISONE 10 MG/1
TABLET ORAL
Qty: 36 TABLET | Refills: 0 | Status: SHIPPED | COMMUNITY
Start: 2021-09-24 | End: 2022-05-18 | Stop reason: ALTCHOICE

## 2021-09-24 NOTE — PATIENT INSTRUCTIONS
--Take oral steroid (predisone) as directed: 6 tabs daily x 1 day, 5 tabs daily x 2 days, 4 tabs daily x 2 days, 3 tabs daily x 2 days, 2 tabs daily x 2 days, 1 tab daily x 2 days  --Apply topical steroid sparingly to affected areas  Avoid to face    --OTC Calamine lotion or Domeboro soaks to weepy/itchy areas  --Seek re-evaluation if no improvement/worsening next 24-48 hours with these measures

## 2021-09-24 NOTE — PROGRESS NOTES
[facility]        NAME: Malcolm Owens is a 31 y o  female  : 1995    MRN: [id number]  DATE: 2021  TIME: 10:30 AM    Assessment and Plan   Rhus dermatitis [L25 5]  1  Rhus dermatitis  predniSONE 10 mg tablet    fluocinonide (LIDEX) 0 05 % cream         Patient Instructions     --Take oral steroid (predisone) as directed: 6 tabs daily x 1 day, 5 tabs daily x 2 days, 4 tabs daily x 2 days, 3 tabs daily x 2 days, 2 tabs daily x 2 days, 1 tab daily x 2 days  --Apply topical steroid sparingly to affected areas  Avoid to face    --OTC Calamine lotion or Domeboro soaks to weepy/itchy areas  --Seek re-evaluation if no improvement/worsening next 24-48 hours with these measures  Chief Complaint     Chief Complaint   Patient presents with   [facility]     Pt has poison on her face, neck, ear, legs and abdomen pt started to notice on Tues  History of Present Illness         Here with complaints of itchy, red rash on face, trunk, extremities  Suspected poison ivy  Started on face 4 days ago, a day after brushing up against plant while cutting grass  Has since spread to arms, legs, trunk, more on right side of body  No relief from OTC hydrocortisone  Some issues with poison ivy in the past        Review of Systems   Review of Systems   Constitutional: Negative for fever  HENT: Negative for sore throat  Respiratory: Negative for shortness of breath  Skin: Positive for rash           Current Medications       Current Outpatient Medications:     aspirin-acetaminophen-caffeine (EXCEDRIN MIGRAINE) 250-250-65 MG per tablet, Take 1 tablet by mouth as needed for headaches, Disp: , Rfl:     Galcanezumab-gnlm (Emgality) 120 MG/ML SOAJ, Inject 1 pen under the skin every 30 (thirty) days, Disp: 1 mL, Rfl: 11    MAGNESIUM PO, Take 100 mg by mouth daily, Disp: , Rfl:     fluocinonide (LIDEX) 0 05 % cream, Apply topically 2 (two) times a day, Disp: 60 g, Rfl: 0   predniSONE 10 mg tablet, TAKE 6 TAB PO DAILY X 1 DAY, THEN 5 TAB DAILY X 2 DAYS, THEN 4 TAB DAILY X 2 DAYS, THEN 3 TAB DAILY X 2 DAYS, THEN 2 TAB DAILY X 2 DAYS, THEN 1 TAB DAILY X 2 DAYS, Disp: 36 tablet, Rfl: 0    Current Allergies     Allergies as of 09/24/2021 - Reviewed 09/24/2021   Allergen Reaction Noted    Lactose intolerance (gi) - food allergy  10/12/2015            The following portions of the patient's history were reviewed and updated as appropriate: allergies, current medications, past family history, past medical history, past social history, past surgical history and problem list      Past Medical History:   Diagnosis Date    Migraine        Past Surgical History:   Procedure Laterality Date    ANKLE SURGERY      WISDOM TOOTH EXTRACTION         Family History   Problem Relation Age of Onset    Arrhythmia Mother     Hyperlipidemia Father     No Known Problems Maternal Grandmother     Alzheimer's disease Maternal Grandfather     Breast cancer Paternal Grandmother     Dementia Paternal Grandfather     Colon cancer Maternal Uncle          Medications have been verified  Objective   /81   Pulse 80   Temp 97 8 °F (36 6 °C)   Resp 16   Ht 5' 5" (1 651 m)   Wt 63 5 kg (140 lb)   SpO2 99%   BMI 23 30 kg/m²   No LMP recorded  Physical Exam     Physical Exam  Constitutional:       General: She is not in acute distress  Skin:     Findings: Rash present  Comments: Right ear and right side of face with scattered erythematous, eczematous patches  Minimal weeping at this time  Similar patches on noted on arms  Nontender  Neurological:      Mental Status: She is alert     Psychiatric:         Mood and Affect: Mood normal

## 2021-11-16 ENCOUNTER — IMMUNIZATIONS (OUTPATIENT)
Dept: FAMILY MEDICINE CLINIC | Facility: HOSPITAL | Age: 26
End: 2021-11-16

## 2021-11-16 DIAGNOSIS — Z23 ENCOUNTER FOR IMMUNIZATION: Primary | ICD-10-CM

## 2021-11-16 PROCEDURE — 0064A COVID-19 MODERNA VACC 0.25 ML BOOSTER: CPT

## 2021-11-16 PROCEDURE — 91306 COVID-19 MODERNA VACC 0.25 ML BOOSTER: CPT

## 2021-12-30 ENCOUNTER — NURSE TRIAGE (OUTPATIENT)
Dept: OTHER | Facility: OTHER | Age: 26
End: 2021-12-30

## 2021-12-30 DIAGNOSIS — Z20.822 ENCOUNTER FOR SCREENING LABORATORY TESTING FOR COVID-19 VIRUS: Primary | ICD-10-CM

## 2021-12-30 PROCEDURE — U0005 INFEC AGEN DETEC AMPLI PROBE: HCPCS | Performed by: INTERNAL MEDICINE

## 2021-12-30 PROCEDURE — U0003 INFECTIOUS AGENT DETECTION BY NUCLEIC ACID (DNA OR RNA); SEVERE ACUTE RESPIRATORY SYNDROME CORONAVIRUS 2 (SARS-COV-2) (CORONAVIRUS DISEASE [COVID-19]), AMPLIFIED PROBE TECHNIQUE, MAKING USE OF HIGH THROUGHPUT TECHNOLOGIES AS DESCRIBED BY CMS-2020-01-R: HCPCS | Performed by: INTERNAL MEDICINE

## 2022-01-03 PROCEDURE — U0005 INFEC AGEN DETEC AMPLI PROBE: HCPCS | Performed by: INTERNAL MEDICINE

## 2022-01-03 PROCEDURE — U0003 INFECTIOUS AGENT DETECTION BY NUCLEIC ACID (DNA OR RNA); SEVERE ACUTE RESPIRATORY SYNDROME CORONAVIRUS 2 (SARS-COV-2) (CORONAVIRUS DISEASE [COVID-19]), AMPLIFIED PROBE TECHNIQUE, MAKING USE OF HIGH THROUGHPUT TECHNOLOGIES AS DESCRIBED BY CMS-2020-01-R: HCPCS | Performed by: INTERNAL MEDICINE

## 2022-01-05 ENCOUNTER — TELEPHONE (OUTPATIENT)
Dept: NEUROLOGY | Facility: CLINIC | Age: 27
End: 2022-01-05

## 2022-01-05 NOTE — TELEPHONE ENCOUNTER
Lillie Chao from [facility] called asking how many migraines days per month prior starting emgality and how many # migraines days per month while on emgality? Advised per office notes 8/4/2021, prior to starting emgality she had over 20 migraine days per month, often nearly daily  Pt reports continued improvement on emgality, about 5 migraines a month  Lillie Chao verbalized understanding      Awaiting determination

## 2022-01-05 NOTE — TELEPHONE ENCOUNTER
Received fax from [facility] requesting Emgality PA  Key# [id number]    Per enc 21-PA  21    PA renewed on CMM   Key: [id number]    Awaiting determination

## 2022-04-22 ENCOUNTER — APPOINTMENT (OUTPATIENT)
Dept: LAB | Facility: CLINIC | Age: 27
End: 2022-04-22

## 2022-04-22 ENCOUNTER — TRANSCRIBE ORDERS (OUTPATIENT)
Dept: LAB | Facility: CLINIC | Age: 27
End: 2022-04-22

## 2022-04-22 DIAGNOSIS — Z00.8 HEALTH EXAMINATION IN POPULATION SURVEY: ICD-10-CM

## 2022-04-22 DIAGNOSIS — Z00.8 HEALTH EXAMINATION IN POPULATION SURVEY: Primary | ICD-10-CM

## 2022-04-22 LAB
CHOLEST SERPL-MCNC: 180 MG/DL
EST. AVERAGE GLUCOSE BLD GHB EST-MCNC: 97 MG/DL
HBA1C MFR BLD: 5 %
HDLC SERPL-MCNC: 77 MG/DL
LDLC SERPL CALC-MCNC: 95 MG/DL (ref 0–100)
NONHDLC SERPL-MCNC: 103 MG/DL
TRIGL SERPL-MCNC: 38 MG/DL

## 2022-04-22 PROCEDURE — 83036 HEMOGLOBIN GLYCOSYLATED A1C: CPT

## 2022-04-22 PROCEDURE — 36415 COLL VENOUS BLD VENIPUNCTURE: CPT

## 2022-04-22 PROCEDURE — 80061 LIPID PANEL: CPT

## 2022-05-18 ENCOUNTER — ANNUAL EXAM (OUTPATIENT)
Dept: OBGYN CLINIC | Facility: CLINIC | Age: 27
End: 2022-05-18
Payer: COMMERCIAL

## 2022-05-18 VITALS
BODY MASS INDEX: 24.27 KG/M2 | DIASTOLIC BLOOD PRESSURE: 72 MMHG | HEIGHT: 66 IN | SYSTOLIC BLOOD PRESSURE: 124 MMHG | WEIGHT: 151 LBS

## 2022-05-18 DIAGNOSIS — Z01.419 ENCNTR FOR GYN EXAM (GENERAL) (ROUTINE) W/O ABN FINDINGS: Primary | ICD-10-CM

## 2022-05-18 PROCEDURE — S0612 ANNUAL GYNECOLOGICAL EXAMINA: HCPCS | Performed by: PHYSICIAN ASSISTANT

## 2022-05-18 NOTE — PROGRESS NOTES
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     We reviewed [facility] guidelines for Pap testing       Last Pap 4/1/21 neg    Family hx of breast cancer: PGM  Family hx of ovarian cancer: no  Family hx of colon cancer: maternal uncle      Current Outpatient Medications:     aspirin-acetaminophen-caffeine (EXCEDRIN MIGRAINE) 250-250-65 MG per tablet, Take 1 tablet by mouth as needed for headaches, Disp: , Rfl:   Social History     Socioeconomic History    Marital status: /Civil Union     Spouse name: Not on file    Number of children: Not on file    Years of education: Not on file    Highest education level: Not on file   Occupational History    Not on file   Tobacco Use    Smoking status: Never Smoker    Smokeless tobacco: Never Used   Vaping Use    Vaping Use: Never used   Substance and Sexual Activity    Alcohol use: Yes     Comment: socially    Drug use: No    Sexual activity: Yes     Partners: Male     Birth control/protection: None   Other Topics Concern    Not on file   Social History Narrative    Not on file     Social Determinants of Health     Financial Resource Strain: Not on file   Food Insecurity: Not on file   Transportation Needs: Not on file   Physical Activity: Not on file   Stress: Not on file   Social Connections: Not on file   Intimate Partner Violence: Not on file   Housing Stability: Not on file     Family History   Problem Relation Age of Onset    Arrhythmia Mother     Hyperlipidemia Father     No Known Problems Maternal Grandmother     Alzheimer's disease Maternal Grandfather     Breast cancer Paternal Grandmother     Dementia Paternal Grandfather     Colon cancer Maternal Uncle      Past Medical History:   Diagnosis Date    Migraine        Review of Systems   Respiratory: Negative  Cardiovascular: Negative  Gastrointestinal: Negative for constipation and diarrhea  Genitourinary: Negative for difficulty urinating, pelvic pain, vaginal bleeding, vaginal discharge, itching or odor  O:  Blood pressure 124/72, height 5' 5 75" (1 67 m), weight 68 5 kg (151 lb), last menstrual period 05/04/2022  Patient appears well and is not in distress  Neck is supple without masses  Breasts are symmetrical without mass, tenderness, nipple discharge, skin changes or adenopathy  Abdomen is soft and nontender without masses  External genitals are normal without lesions or rashes  Urethra and urethral meatus are normal  Bladder is normal to palpation  Vagina is normal without discharge or bleeding  Cervix is normal without discharge or lesion  Uterus is normal, mobile, nontender without palpable mass  Adnexa are normal, nontender, without palpable mass  A:   Yearly exam      P:   Pap 2024   Start prenatal vitamin    Call with +HPT     RTO one year for yearly exam or sooner as needed

## 2022-07-27 ENCOUNTER — TELEPHONE (OUTPATIENT)
Dept: NEUROLOGY | Facility: CLINIC | Age: 27
End: 2022-07-27

## 2022-07-27 NOTE — TELEPHONE ENCOUNTER
Called and spoke to patient to confirm their upcoming appointment with Dr Sha Horta  Informed patient about arriving in the [place] location 15 minutes prior to their appointment to get checked in and going over chart

## 2022-08-03 ENCOUNTER — OFFICE VISIT (OUTPATIENT)
Dept: NEUROLOGY | Facility: CLINIC | Age: 27
End: 2022-08-03
Payer: COMMERCIAL

## 2022-08-03 VITALS
TEMPERATURE: 97.9 F | DIASTOLIC BLOOD PRESSURE: 79 MMHG | BODY MASS INDEX: 23.95 KG/M2 | HEIGHT: 66 IN | HEART RATE: 68 BPM | SYSTOLIC BLOOD PRESSURE: 126 MMHG | WEIGHT: 149 LBS

## 2022-08-03 DIAGNOSIS — G43.709 CHRONIC MIGRAINE WITHOUT AURA WITHOUT STATUS MIGRAINOSUS, NOT INTRACTABLE: Primary | ICD-10-CM

## 2022-08-03 PROCEDURE — 99215 OFFICE O/P EST HI 40 MIN: CPT | Performed by: PSYCHIATRY & NEUROLOGY

## 2022-08-03 NOTE — PROGRESS NOTES
[facility] Consult - Follow up   PATIENT:  Marilee Roque  MRN:  [id number]  :  1995  DATE OF SERVICE:  8/3/2022  REFERRED BY: No ref  provider found  PMD: Dasha Martel MD    Assessment/Plan:   Marilee Roque is a delightful 31 y o  female with a past medical history that includes vitamin-D deficiency, anxiety, migraines referred here for evaluation of headache  My initial evaluation 2021     Chronic migraine without aura without status migrainosus, not intractable  She reports a long history of headaches and migraines dating back to around age 20  They were very difficult to control despite multiple medication trials prior to starting emgality injections for which she has had significant improvement  She previously followed with my Neurology colleague Dr Eliceo Trujillo, please see EMR for details  Pain is typically bifrontal with typical associated migrainous features, denies aura  -   In  prior to starting emgality she had over 20 migraine days per month, often nearly daily  - as of 2021: she reports continued improvement on emgality, about 5 migraines a month that are moderate usually and typically respond to ibuprofen  Continue emgality  - as of 8/3/2022: self discontinued emgality in Dec due to considering pregnancy at some point in the future  As expected doing worse off of it with over 15 migraines a month, lasting over 4 hours, for over 3 months that improve only a bit with OTC meds  Trial of botox for prevention  We discussed some options she could consider until pregnancy such as nurtec, she is not interested at this time  Did recommend restarting magnesium, riboflavin and vitamin D  Workup:  - Neurologic assessment reveals normal neurological exam   - MRI Brain 14:  No intracranial MR abnormality to account for the patient's symptoms     Left maxillary sinus retention cyst     Preventative:  - we discussed headache hygiene and lifestyle factors that may improve headaches  - trial of botox  Discussed proper use, possible side effects and risks  -  resume magnesium, consider adding riboflavin, vitamin D  - Past/ failed/contraindicated:  Lamictal, duloxetine, cyproheptadine, amitriptyline, venlafaxine, sertraline, zonisamide, aimovig  - future options post pregnancy/breast feeding: resume emgality since helped  - future options pre pregnancy: cyproheptadine, cyclobenzaprine, propranolol, nurtec every other day    Abortive:  - discussed not taking over-the-counter or prescription pain medications more than 3 days per week to prevent medication overuse/rebound headache  -   Ibuprofen usually helps, occasionally takes Excedrin but this often causes a rebound headache the next day  -  Past/ helped:  Decadron 2 mg, patient does not recall but per Dr Kayleigh Moreno Notes Olanzapine 5 mg also helped as backup, unclear if p o  or IM Toradol  Or Depakote helped or not but has had  - Past/ failed/contraindicated:  IV Toradol, prochlorperazine, sumatriptan, rizatriptan, frovatriptan, diphenhydramine  - future options: Toradol IM or p o , could consider trial for 5 days of Depakote or dexamethasone 4 prolonged migraine, ubrelvy, reyvow, nurtec      Patient instructions      Could add 1000 units Vitamin D daily       Headache/migraine treatment:   Abortive medications (for immediate treatment of a headache): It is ok to take ibuprofen, acetaminophen or naproxen (Advil, Tylenol,  Aleve, Excedrin) if they help your headaches you should limit these to No more than 3 times a week to avoid medication overuse/rebound headaches       exedrin tension - is just acetaminophen/tylenol plus caffeine      ---------  Rescue medications thought to be likely ok during pregnancy:   First line: tylenol/acetaminophen  Second line: Metoclopramide/Reglan, diphenhydramine/benadryl, Ondansetron/zofran  Third line: fioricet or rizatriptan/sumatriptan  Thought to be safe during 2nd trimester - ibuprofen, naproxen and diclofenac     These medications are thought to be compatible with breast-feeding:  Ibuprofen, acetaminophen   Probably compatible with breast-feeding:  Diphenhydramine/Benadryl, metoclopramide/ Reglan, ondansetron /Zofran, if needed steroids  No data/probably compatible: Triptans, prochlorperazine/Compazine, nurtec   ----        Over the counter preventive supplements for headaches/migraines (if you try, try for 3 months straight)  (to take every day to help prevent headaches - not to take at the time of headache):  (there are combo pills online of these)  [x] Magnesium 400mg daily (If any diarrhea or upset stomach, decrease dose  as tolerated)  [x] Riboflavin (Vitamin B2)  400mg daily (adults) (FYI B2 may make your urine bright/neon yellow)      Prescription preventive medications for headaches/migraines   (to take every day to help prevent headaches - not to take at the time of headache):  [x] trial of botox     *Typically these types of medications take time untill you see the benefit, although some may see improvement in days, often it may take weeks, especially if the medication is being titrated up to a beneficial level  Please contact us if there are any concerns or questions regarding the medication  Lifestyle Recommendations:  [x] SLEEP - Maintain a regular sleep schedule: Adults need at least 7-8 hours of uninterrupted a night  Maintain good sleep hygiene:  Going to bed and waking up at consistent times, avoiding excessive daytime naps, avoiding caffeinated beverages in the evening, avoid excessive stimulation in the evening and generally using bed primarily for sleeping  One hour before bedtime would recommend turning lights down lower, decreasing your activity (may read quietly, listen to music at a low volume)  When you get into bed, should eliminate all technology (no texting, emailing, playing with your phone, iPad or tablet in bed)    [x] HYDRATION - Maintain good hydration  Drink  2L of fluid a day (4 typical small water bottles)  [x] DIET - Maintain good nutrition  In particular don't skip meals and try and eat healthy balanced meals regularly  [x] TRIGGERS - Look for other triggers and avoid them: Limit caffeine to 1-2 cups a day or less  Avoid dietary triggers that you have noticed bring on your headaches (this could include aged cheese, peanuts, MSG, aspartame and nitrates)  [x] EXERCISE - physical exercise as we all know is good for you in many ways, and not only is good for your heart, but also is beneficial for your mental health, cognitive health and  chronic pain/headaches  I would encourage at the least 5 days of physical exercise weekly for at least 30 minutes  Education and Follow-up  [x] Please call with any questions or concerns  Of course if any new concerning symptoms go to the emergency department  [x] Follow up 3-4 months with Dr Taj Burnett and 6 months PA Pabol Garber, sooner if needed         CC: We had the pleasure of evaluating Ileana Rayo in neurological consultation today  Ileana Rayo is a   right handed female who presents today for evaluation of headaches  History obtained from patient as well as available medical record review  History of Present Illness:   Interval history as of 8/3/2022  - denies any new or concerning neurologic symptoms since last visit   - considering trying for pregnancy soon     Headaches and migraines   - improved on emgality and stopped in Dec  - now 3-4 migraines a week that sometimes respond to exedrin or advil    Preventative:   - stopped emgality in Dec    -  Continue magnesium, consider adding riboflavin, vitamin D     Abortive:   ibuprofen      History as of initial visit 8/2021:  Former patient of Dr Jt Hinson  - last saw her 9/6/19  - has been well controlled on emgality      Headaches started at what age?  20years old  How often do the headaches occur?   -before emgality she had over 20 migraine days per month  - as of 8/2/2021: she reports continued improvement on emgality, about 5 a month that are moderate usually and typically respond to ibuprofen   What time of the day do the headaches start? No particular time of day   How long do the headaches last? Over 4 hours, typically improve with sleep, in the pastr up to days    Are you ever headache free? Yes    Aura? without aura     Last eye exam: years ago, but no eye symptoms   - stopped wearing glasses because did not need them    Where is your headache located and pain quality?   - bifrontal pounding throbbing pressure   What is the intensity of pain? Average: 5-7/10, worst lately worst 7 - past 10/10 - ED infusions years ago   Associated symptoms: a lot of these not anymore   [x] Nausea       [] Vomiting        [] Diarrhea  [x] Problems with concentration  [x] Photophobia  >   [x]Phonophobia      [x] Osmophobia  [] Blurred vision   [x] Prefer quiet, dark room  [] Light-headed or dizzy     [] Tinnitus    [] Hands or feet tingle or feel numb/paresthesias      [] Ptosis      [] Facial droop  [] Lacrimation  [] Nasal congestion/rhinorrhea      Number of days missed per month because of headaches:  Work (or school) days: 0    Headache triggers:  Caffeine    Have you seen someone else for headaches or pain? Yes, Dr Danni Seymour neurology/headache specialist   Have you had trigger point injection performed and how often? No  Have you had Botox injection performed and how often? No   Have you had epidural injections or transforaminal injections performed? No  Are you current pregnant or planning on getting pregnant? No, not for a few years   Have you ever had any Brain imaging? yes     What medications do you take or have you taken for your headaches? ABORTIVE:      Ibuprofen usually helps   exedrin - may have rebound headache next day     Past   Toradol p o    DHE  Depakote  IV Toradol not affected in the past  Dexamethasone a - helped - has not needed in 2 years   olanzapine 5 mg - helped as back up  - has not needed in 2 years  Prochlorperazine/Compazine did not help  Sumatriptan headache came back in 2 hours  Rizatriptan did not help  Frovatriptan did not help  Diphenhydramine/Benadryl    PREVENTIVE:     emgality - no side effects   Magnesium     Past/ failed/contraindicated:  lamictal 50/100 mg - off since maybe around 9/2019  duloxetine/cymbalta 40 mg   - off since maybe around 9/2019  Cyproheptadine 4 mg  Amitriptyline  Venlafaxine side effects of sedating  Sertraline side effects of anxiety  Zonisamide  aimovig       Alternative therapies used in the past for headaches? Chiropractor     LIFESTYLE  Sleep   - averages: 8 hours   Problems falling asleep?:   No  Problems staying asleep?:  No    Physical activity: walking running on feet at work all day     Water: maybe 20 oz a day   Caffeine: 0 per day    Mood:  anxiety - was during PT school, better now     The following portions of the patient's history were reviewed and updated as appropriate: allergies, current medications, past family history, past medical history, past social history, past surgical history and problem list     Pertinent family history:  Family history of headaches:  migraine headaches in grandmother  Any family history of aneurysms - No    Pertinent social history:  Work: PT   Lives with      Illicit Drugs: denies  Alcohol/tobacco: Denies tobacco use, alcohol intake: social drinker    Past Medical History:     Past Medical History:   Diagnosis Date    Migraine        Patient Active Problem List   Diagnosis    Migraine, unspecified, not intractable, without status migrainosus    Anxiety       Medications:      Current Outpatient Medications   Medication Sig Dispense Refill    aspirin-acetaminophen-caffeine (EXCEDRIN MIGRAINE) 250-250-65 MG per tablet Take 1 tablet by mouth as needed for headaches       No current facility-administered medications for this visit          Allergies: Allergies   Allergen Reactions    Lactose Intolerance (Gi) - Food Allergy        Family History:     Family History   Problem Relation Age of Onset    Arrhythmia Mother     Hyperlipidemia Father     No Known Problems Maternal Grandmother     Alzheimer's disease Maternal Grandfather     Breast cancer Paternal Grandmother     Dementia Paternal Grandfather     Colon cancer Maternal Uncle        Social History:     Social History     Socioeconomic History    Marital status: /Civil Union     Spouse name: Not on file    Number of children: Not on file    Years of education: Not on file    Highest education level: Not on file   Occupational History    Not on file   Tobacco Use    Smoking status: Never Smoker    Smokeless tobacco: Never Used   Vaping Use    Vaping Use: Never used   Substance and Sexual Activity    Alcohol use: Yes     Comment: socially    Drug use: No    Sexual activity: Yes     Partners: Male     Birth control/protection: None   Other Topics Concern    Not on file   Social History Narrative    Not on file     Social Determinants of Health     Financial Resource Strain: Not on file   Food Insecurity: Not on file   Transportation Needs: Not on file   Physical Activity: Not on file   Stress: Not on file   Social Connections: Not on file   Intimate Partner Violence: Not on file   Housing Stability: Not on file         Objective:       Physical Exam:                                                                 Vitals:            Constitutional:    /79 (BP Location: Left arm, Patient Position: Sitting, Cuff Size: Standard)   Pulse 68   Temp 97 9 °F (36 6 °C) (Temporal)   Ht 5' 5 75" (1 67 m)   Wt 67 6 kg (149 lb)   BMI 24 23 kg/m²   BP Readings from Last 3 Encounters:   08/03/22 126/79   05/18/22 124/72   09/24/21 130/81     Pulse Readings from Last 3 Encounters:   08/03/22 68   09/24/21 80   08/02/21 71         Well developed, well nourished, well groomed   No dysmorphic features  Psychiatric:  Normal behavior and appropriate affect, tearful       Neurological Examination:     Mental status/cognitive function:   Recent and remote memory intact  Attention span and concentration as well as fund of knowledge are appropriate for age  Normal language and spontaneous speech  Cranial Nerves:  III, IV, VI-Pupils were equal, round  Extraocular movements were full and conjugate   VII-facial expression symmetric  VIII-hearing grossly intact bilaterally   Motor Exam: symmetric bulk throughout  no atrophy, fasciculations or abnormal movements noted  Coordination:  no apparent dysmetria, ataxia or tremor noted  Gait: steady casual gait     Pertinent lab results:   See EMR for recent labs  - 02/21/2019:  Vitamin-D 26 2, B12 364  - 11/21/2018 BMP was clipped CO2 20, CBC with WBC 10 18  TSH normal     Imaging: I have personally reviewed imaging and radiology read   - MRI Brain 12/30/14:  No intracranial MR abnormality to account for the patient's symptoms  Left maxillary sinus retention cyst      Review of Systems:   ROS obtained by medical assistant Personally reviewed and updated if indicated  I recommended PCP follow up for non neurologic problems  Review of Systems   Constitutional: Negative  HENT: Negative  Eyes: Negative  Respiratory: Negative  Cardiovascular: Negative  Gastrointestinal: Negative  Endocrine: Negative  Genitourinary: Negative  Musculoskeletal: Negative  Skin: Negative  Allergic/Immunologic: Negative  Neurological: Positive for headaches  Hematological: Negative  Psychiatric/Behavioral: Negative  I have spent 29 minutes with Patient  today in which greater than 50% of this time was spent in counseling/coordination of care  I also spent 15 minutes non face to face for this patient the same day         Author:  Laura Diaz MD 8/3/2022 5:15 PM

## 2022-08-03 NOTE — PATIENT INSTRUCTIONS
Could add 1000 units Vitamin D daily       Headache/migraine treatment:   Abortive medications (for immediate treatment of a headache): It is ok to take ibuprofen, acetaminophen or naproxen (Advil, Tylenol,  Aleve, Excedrin) if they help your headaches you should limit these to No more than 3 times a week to avoid medication overuse/rebound headaches  exedrin tension - is just acetaminophen/tylenol plus caffeine      ---------  Rescue medications thought to be likely ok during pregnancy:   First line: tylenol/acetaminophen  Second line: Metoclopramide/Reglan, diphenhydramine/benadryl, Ondansetron/zofran  Third line: fioricet or rizatriptan/sumatriptan  Thought to be safe during 2nd trimester - ibuprofen, naproxen and diclofenac     These medications are thought to be compatible with breast-feeding:  Ibuprofen, acetaminophen   Probably compatible with breast-feeding:  Diphenhydramine/Benadryl, metoclopramide/ Reglan, ondansetron /Zofran, if needed steroids  No data/probably compatible: Triptans, prochlorperazine/Compazine, nurtec   ----        Over the counter preventive supplements for headaches/migraines (if you try, try for 3 months straight)  (to take every day to help prevent headaches - not to take at the time of headache):  (there are combo pills online of these)  [x] Magnesium 400mg daily (If any diarrhea or upset stomach, decrease dose  as tolerated)  [x] Riboflavin (Vitamin B2)  400mg daily (adults) (FYI B2 may make your urine bright/neon yellow)      Prescription preventive medications for headaches/migraines   (to take every day to help prevent headaches - not to take at the time of headache):  [x] trial of botox     *Typically these types of medications take time untill you see the benefit, although some may see improvement in days, often it may take weeks, especially if the medication is being titrated up to a beneficial level   Please contact us if there are any concerns or questions regarding the medication  Lifestyle Recommendations:  [x] SLEEP - Maintain a regular sleep schedule: Adults need at least 7-8 hours of uninterrupted a night  Maintain good sleep hygiene:  Going to bed and waking up at consistent times, avoiding excessive daytime naps, avoiding caffeinated beverages in the evening, avoid excessive stimulation in the evening and generally using bed primarily for sleeping  One hour before bedtime would recommend turning lights down lower, decreasing your activity (may read quietly, listen to music at a low volume)  When you get into bed, should eliminate all technology (no texting, emailing, playing with your phone, iPad or tablet in bed)  [x] HYDRATION - Maintain good hydration  Drink  2L of fluid a day (4 typical small water bottles)  [x] DIET - Maintain good nutrition  In particular don't skip meals and try and eat healthy balanced meals regularly  [x] TRIGGERS - Look for other triggers and avoid them: Limit caffeine to 1-2 cups a day or less  Avoid dietary triggers that you have noticed bring on your headaches (this could include aged cheese, peanuts, MSG, aspartame and nitrates)  [x] EXERCISE - physical exercise as we all know is good for you in many ways, and not only is good for your heart, but also is beneficial for your mental health, cognitive health and  chronic pain/headaches  I would encourage at the least 5 days of physical exercise weekly for at least 30 minutes  Education and Follow-up  [x] Please call with any questions or concerns  Of course if any new concerning symptoms go to the emergency department    [x] Follow up 3-4 months with Dr Ott Friday and 6 months PA Tyron Merritt, sooner if needed

## 2022-10-18 ENCOUNTER — TELEPHONE (OUTPATIENT)
Dept: NEUROLOGY | Facility: CLINIC | Age: 27
End: 2022-10-18

## 2022-10-18 NOTE — TELEPHONE ENCOUNTER
New-Start-Botox-Authorization    Submitted prior-authorization request to [facility] via fax for:    New-Start-Botox-200 units, [id number], [id number]  Awaiting approval/denial response  Will follow up on the status of this

## 2023-01-13 ENCOUNTER — ULTRASOUND (OUTPATIENT)
Dept: OBGYN CLINIC | Facility: MEDICAL CENTER | Age: 28
End: 2023-01-13

## 2023-01-13 VITALS
DIASTOLIC BLOOD PRESSURE: 72 MMHG | HEIGHT: 66 IN | SYSTOLIC BLOOD PRESSURE: 136 MMHG | WEIGHT: 151.6 LBS | BODY MASS INDEX: 24.36 KG/M2

## 2023-01-13 DIAGNOSIS — N91.2 AMENORRHEA: Primary | ICD-10-CM

## 2023-01-13 DIAGNOSIS — Z36.89 ESTABLISH GESTATIONAL AGE, ULTRASOUND: ICD-10-CM

## 2023-01-13 DIAGNOSIS — Z3A.09 9 WEEKS GESTATION OF PREGNANCY: ICD-10-CM

## 2023-01-13 NOTE — PROGRESS NOTES
Subjective  Patient ID: Frankie Poser is a 28 y o  female here for No chief complaint on file  She is C/O amenorrhea  Signs and symptoms of pregnancy:    Breast tenderness yes   Fatigue yes   Cramping or Pelvic Pain no   Spotting or Vaginal Bleeding no   Nausea or vomiting no    Patient's last menstrual period was 2022  giving her an GIANNI of 23 and a gestational age of  8w4d (based on LMP)    Menstrual cycle: irregular,Pregnancy was planned  She has started taking a prenatal vitamin    OB History    Para Term  AB Living   1 0 0 0 0 0   SAB IAB Ectopic Multiple Live Births   0 0 0 0 0      # Outcome Date GA Lbr Wilson/2nd Weight Sex Delivery Anes PTL Lv   1 Current                 The following portions of the patient's history were reviewed and updated as appropriate: allergies, current medications, past family history, past medical history, past social history, past surgical history, and problem list   Perinent hx that may affect pregnancy    The following portions of the patient's history were reviewed and updated as appropriate: allergies, current medications, past family history, past medical history, past social history, past surgical history, and problem list     Review of Systems    See HPI for pertinent positives  /72 (BP Location: Left arm, Patient Position: Sitting, Cuff Size: Standard)   Ht 5' 5 75" (1 67 m)   Wt 68 8 kg (151 lb 9 6 oz)   LMP 2022   BMI 24 66 kg/m²   OBGyn Exam      FIRST TRIMESTER OBSTETRIC ULTRASOUND     Patient's last menstrual period was 2022  INDICATION: establish gestational age      FINDINGS:  See imaging report for details     Additional Findings: none     FHR: 174  IMPRESSION:    Single intrauterine pregnancy of 10 weeks 1days gestational age  Fetal cardiac activity detected  No adnexal masses seen    EDC by LMP: 23  EDC by this Ultrasound: 8/10/23    Assigning a Final GIANNI  Please choose how you are assigning the GIANNI: The gestational age by LMP is 7w 0d - 13w 6d and demonstrates 7 or fewer days difference from the gestational age by [facility], therefore the final GIANNI will be based on the LMP    Final GIANNI: 23 by LMP  JEN Sheridan  [facility] [address]  Dept: [phone]  Dept Fax: [phone]  Loc: [phone]  Loc Fax: [phone]    Ultrasound Probe Disinfection    A transvaginal ultrasound was performed  Prior to use, disinfection was performed with High Level Disinfection Process [employer])  Probe serial number F: [id number] was used  Assessment/Plan:               Problem List Items Addressed This Visit        Pregnancy    9 weeks gestation of pregnancy     She is a  with Patient's last menstrual period was 2022  US today is showing a viable IUP that is c/w GA by LMP  No change in EDC of 23  F/U for ob intake, followed by initial prenatal exam in  2 wks           Other Visit Diagnoses     Amenorrhea    -  Primary    Relevant Orders    AMB US OB < 14 weeks single or first gestation level 1 (Completed)    Establish gestational age, ultrasound        Relevant Orders    AMB US OB < 14 weeks single or first gestation level 1 (Completed)          Orders Placed This Encounter   Procedures   • AMB US OB < 14 weeks single or first gestation level 1

## 2023-01-13 NOTE — ASSESSMENT & PLAN NOTE
She is a  with Patient's last menstrual period was 2022  US today is showing a viable IUP that is c/w GA by LMP  No change in EDC of 23  F/U for ob intake, followed by initial prenatal exam in  2 wks

## 2023-01-13 NOTE — PROGRESS NOTES
Patient here for early [address]  LMP 11/7/22 (9w4d)  This is her first pregnancy, this was unplanned but welcoming  Started with prenatal  Symptoms include nausea if skips a meals, breast tenderness, fatigue

## 2023-01-13 NOTE — PATIENT INSTRUCTIONS
Again, congratulations on your pregnancy! NEXT STEPS  Get Prenatal bloodwork  Call MFM to schedule next ultrasound (done 12-13 wks)   Contact information for [facility])- Main Number [phone]   Return to our office in 1-2 weeks for an OB intake and initial prenatal Exam(or sooner if any problems/concerns arise- see packet for things to report)  Check out [facility] website to read the "Pregnancy Essentials Guide" -this has lots of great early pregnancy information     It can be found by going to [employer]  org-->select services-->select women's health-->select Obstetrics  http://bert craven/    Below is a variety of information that is useful in early pregnancy   Genetic screening can be very confusing for most people   We do recommend genetic screening universally for all pregnant women  Our goal is to have the most healthy uncomplicated pregnancy possible and this is one way to identify possible complications early  Common disorders we can screen for include: Down Syndrome, Neural tube defects ( like spina bifida or gastroschisis) and trisomy 15, even possibly more  There are several options we will talk more about  Below is some information to get you started thinking about this  We will discuss this more at the next appt  GUIDE TO GENETIC TESTING    Aneuploidy Testing  Trisomy 21 (Down Syndrome), Trisomy 18, and Open Neural Tube Defects (Spina Bifida)  You may choose one of the following options: See below for CPT/Diagnostic codes  NIPT (Non-Invasive Prenatal Testing or Cell Free- Fetal DNA): This simple and accurate non-invasive prenatal screening blood test offers results for early risk assessment of Down syndrome (Trisomy 21), or Trisomy 25, trisomy 15 and other aneuploidy conditions  The test also offers an optional analysis for fetal sex    The test analyzes the relative amount of 21, 18, 13; X and Y chromosome material in circulating cell-free DNA from a maternal blood sample  This test can be performed at any time after 10 weeks gestation  If you elect this test, you will also have an AFP (alpha-fetoprotein) blood test to test for open neural tube defects  Recommended follow up to a positive result is genetic counseling and prenatal diagnosis  Sequential Screening with Nuchal Translucency: This is a two-step test to detect whether a fetus is at increased risk for trisomy 24, trisomy 25, trisomy 15 and open neural tube defects  The test has a narrow window for testing (the first step must be performed between 10 and 13 weeks gestation)  It includes two blood draws and an ultrasound  The ultrasound measures the amount of fluid behind the baby’s neck called the nuchal translucency (NT)  The blood tests measures three different maternal hormone levels, -- pregnancy associated plasma protein (CODY-A), human chorionic gonadotrophin (hCG), and dimeric inhibin A (ALISE)  These measurements in combination with some maternal information such as height and weight are used to calculate whether the baby is at increased risk for Down Syndrome or Trisomy 18  An alpha-fetoprotein test (AFP) is also included to test for open neural tube defects  Recommended follow up to a positive result is additional testing that is more definitive, and referral for genetic counseling and prenatal diagnosis  Quad Screen: This test is also known as the quadruple marker test   It is a test that measures levels of four substances in a pregnant woman’s blood--Alpha-fetoprotein (AFP), a protein made by the developing baby; Human chorionic gonadotropin (hCG), a hormone made by the placenta; Estriol, a hormone made by the placenta and the baby’s liver; and Inhibin A, another hormone made by the placenta    Typically, the quad screen is done between weeks 15 and 20 of pregnancy--the second trimester and the results indicate whether the baby is at higher risk for Down Syndrome (Trisomy 21), Trisomy 18, and open neural tube defects  This is a screening test   Recommended follow up to a positive result is additional testing that is more definitive, as well as referral for genetic counseling and prenatal diagnosis  Trisomy 21 Trisomy 18 Trisomy 13   NIPT  (FPR 0 1%) <99% <98% 99%   Sequential Screening (FPR 3 5%) 92% 90% N/A   Quad Screen   (FPR 5%) 83% 80% N/A   (FPR is False Positive Rate)       Additional Screening Tests Offered  Cystic Fibrosis: Cystic Fibrosis is the most common inherited disease of children and young adults  The carrier frequency is 1 in 24, to 1 in 97  Both parents need to be carriers for a child to be affected (25% chance)  One in 200, children born are affected  Cystic fibrosis is a disorder of mucus production and produces abnormally thick mucus leading to life threatening lung infections, digestion problems, poor growth, infertility, and more  Symptoms range from mild to severe, but individuals with severe disease may die in childhood  With treatments today, people with Cystic Fibrosis can live into their 30’s  CF does not affect intelligence  Recommended follow up to a positive result is testing of the baby’s father  Spinal Muscular Atrophy (SMA): SMA is the most common inherited cause of early childhood death  The carrier frequency is 1 in 52 to 1 in 67 in the [place] and both parents need to be carriers for a child to be affected (25% chance)  1 in 11,000 children are affected  SMA is a progressive degeneration of lower motor neurons  Muscle weakness is the most common type with respiratory failure by the age of 3years old  Muscles responsible for crawling, walking, swallowing, and head and neck control are most severely affected  Recommended follow up to a positive result is testing of the baby’s father        Fragile X Syndrome (the most common inherited cause of developmental delays): Fragile X syndrome is an “X-linked” genetic disease, which means it is only carried by the mom  Unfortunately, 1 in 250 females are carriers and a child has a 50% chance of being affected if this is the case  1 in 4000 boys is affected with Fragile X and 1 in 8000 girls is affected  Approximately 1/3 of all children born with Fragile X also have autism and hyperactivity  Recommended follow up to a positive result is genetic counseling and prenatal diagnosis  Guide To Insurance Coverage For Genetic Screening  Due to the complexity of coverage guidelines, we are unable to quote benefits or guarantee insurance coverage for any of these tests  Insurance benefits are plan-specific and offer vastly different coverage based on your policy  The handout attached explains the benefits to each test, and also provides the billing (CPT) codes for each test   Even if the testing is covered, it could be applied to any unmet deductibles, and copays may apply, resulting in a bill  You are encouraged to contact your insurance company to obtain your benefits based on your age and risk factors, so that there are no surprises  Test Insurance Procedure (CPT) code   NIPT-cell free fetal DNA Most accurate non-invasive test- not always covered w/o risk factors 95892   Sequential Screen w/ NT US Current standard test-should be covered Part 1: (if lab is [facility]) 97577,83513   (If lab is [facility]) [address]  Part 2: (if lab is [facility])[address]643,48265,89078, [address]778  (If lab is [facility]) 03499   Quad screen Old standard-use if past 1st trimester 24768, [address] [facility], [address], [address]   CF- Cystic Fibrosis  05249   SMA- Spinal Musc   Atrophy  62376   Fragile X  [id number]       Diagnosis code used Varies based on history- But will be one of these:  Encounter for  screening for other genetic defect Z36 8  Advanced Maternal Age (over 28) O12 46  Family History of Genetic Disease Carrier Z84 81    Please contact your insurance company with the appropriate CPT code from the attached list, and diagnosis code applicable to your situation  We ask that you review this information and decide what testing you would like to have performed  Please note that the Sequential Screening with Nuchal Translucency has a smaller window of time to be performed during pregnancy (Prior to 14 wks)  [place] [address], [address]  [address], [address]    Warning Signs During Pregnancy  The list below includes warning signs your providers would like you to be aware of  If you experience any of these at any time during your pregnancy, please call us as soon as possible  Vaginal bleeding   Sharp abdominal pain that does not go away   Fever (more than 100  4? F and is not relieved with Tylenol)   Persistent vomiting lasting greater than 24 hours   Chest pain   Pain or burning when you urinate   Severe headache that doesn’t resolve with Tylenol   Blurred vision or seeing spots in your vision   Sudden swelling of your face or hands   Redness, swelling or pain in a leg   A sudden weight gain in just a few days   Decrease in your baby’s movements (after 28 weeks or the 6th month of pregnancy)   A loss of watery fluid from your vagina - can be a gush, a trickle or  continuous wetness   After 20 weeks of pregnancy, rhythmic cramping (greater than 4 per hour)  or menstrual like low/pelvic pain    Call the OFFICE [phone] for any questions/emergencies  At night or on the weekend, please indicate it is an emergency and the DOCTOR on call will be paged      Discomforts of Early Pregnancy    Tips for coping with nausea and vomiting during pregnancy   Eat meals and snacks slowly   Eat every 1-2 hours to avoid a full stomach   Don’t skip meals, avoid empty stomach   Eat a snack prior to getting out of bed   Avoid food and beverages with a strong aroma   Avoid dehydration - drink enough fluid to keep the urine pale yellow   Drink fluids before a meal to minimize the effect of a full stomach   Limit the amount of coffee and beverages that contain caffeine   Eliminate spicy, odorous, high fat (fried foods), acidic (tomato products) and sweet foods   Fluids that contain lemon (lemonade), mint (tea) or orange can usually be well tolerated   Snacks and meals that contain low-fat protein (lean meats, fish, poultry and eggs) along with eating easily digestible carbs (fruit, rice, toast, crackers and dry cereal) may be tolerated better   Foods with ginger may be well tolerated  May use ginger root powder, capsules or extract (up to 1000 mg per day)   Drink liquids in small amounts    If symptoms persist, please contact your provider  Tips for coping with constipation during pregnancy   Increase fiber and fluids   - Drink 8-10 cups of liquid, like water or low-sugar juice daily  - Keep urine pale with fluids (water, milk), fruit and vegetables   Eat a well-balanced diet that contains high fiber food (fruits, vegetables, whole grain breads and cereals, bran and dried beans)   Take a 30-minute walk daily   You may take a mild stool softener such as Colace®    If symptoms persist, please contact your provider  For any emergencies, PLEASE CALL THE OFFICE at [phone]  If the office is closed, the doctor on call will be paged by the answering service  Medications and Pregnancy- see Pregnancy Essentials guide online- page 9    Expected Weight Gain During Pregnancy  If you have a healthy BMI (18-25) prior to pregnancy:  The recommended weight gain is between 25-35 pounds  Approximate weight gain  in the first trimester is 1-4 5 pounds  An expected weight gain during the second and  third trimester is approximately one pound per week  If you have a BMI of less than 18 prior to pregnancy,  you are considered underweight:  The recommended weight gain is between 28-40 pounds  Approximate weight gain  in the first trimester is 1-4 5 pounds   An expected weight gain during the second and  third trimester is just over one pound per week  If your BMI is 25 to 29 9: you are considered overweight:  You should gain 1/2 to 2/3 pound during the second and third trimester, for a total  weight gain of 15 to 25 pounds  If you have a BMI of greater than 30 prior to pregnancy,  you are considered overweight:  The recommended weight gain is between 15-25 pounds  Approximate weight gain  in the first trimester is 1-4 5 pounds  An expected weight gain during the second  and third trimester is approximately 0 5 pound per week  Foods to avoid during pregnancy:   Unpasteurized milk, juice and cheese  - Soft cheeses like feta or brie (if made with UNPASTEURIZED milk)   Unheated deli meats like lunchmeat and hotdogs   Undercooked poultry, beef, pork, seafood including raw sushi    What fish is safe to eat during pregnancy? Eat 8 to 12 ounces of fish a week  Pick from this group frequently, especially if you follow  the American Heart Association’s recommendation to eat fish at least 2 times a week  AVOID HIGH MERCURY FISH  A single meal of the following fish can put  you over the Environmental Protection  Agency’s safe limit for the month  High mercury fish:  Shark  Swordfish  [employer]  Tile Fish    Caffeine and Pregnancy    The [employer] and [employer] of Obstetrics and Gynecologists (ACOG) urge pregnant  women to limit their caffeine consumption to no more than 200 milligrams (mg) per day  This is  comparable to having one 12-ounce cup of coffee a day  This level has been shown not to increase risk  of miscarriage, growth or  labor complications  Effects of higher levels are not known  Exercise During Pregnancy  A daily exercise program that consists of 30 minutes a day is recommended     Low impact exercises like walking and swimming are great exercises throughout  all of pregnancy   If you’re an avid strength  avoid lifting very heavy weights - nothing more  than 30 pounds    Drink plenty of fluids while exercising to stay hydrated  Be careful to avoid overheating  ACTIVITIES TO AVOID   Exercises that can make you lose your balance  Activities that can put your baby at risk i e  horseback riding, scuba diving, skiing  or snowboarding  Any other sport that puts you at risk for getting hit in the  abdominal area  Do not use saunas, steam rooms or hot tubs (that have a higher temperature  than 100F)   After the first trimester, avoid exercises that require you to lay flat on your back  Avoid exceeding a heart rate greater than 140 beats per minute   As long as you are  able to hold a conversation while exercising your heart rate is likely acceptable

## 2023-01-25 ENCOUNTER — INITIAL PRENATAL (OUTPATIENT)
Dept: OBGYN CLINIC | Facility: CLINIC | Age: 28
End: 2023-01-25

## 2023-01-25 ENCOUNTER — TELEPHONE (OUTPATIENT)
Dept: NEUROLOGY | Facility: CLINIC | Age: 28
End: 2023-01-25

## 2023-01-25 VITALS — HEIGHT: 65 IN | BODY MASS INDEX: 25.16 KG/M2 | WEIGHT: 151 LBS

## 2023-01-25 DIAGNOSIS — Z34.01 ENCOUNTER FOR SUPERVISION OF NORMAL FIRST PREGNANCY IN FIRST TRIMESTER: Primary | ICD-10-CM

## 2023-01-25 DIAGNOSIS — Z3A.09 9 WEEKS GESTATION OF PREGNANCY: ICD-10-CM

## 2023-01-25 NOTE — PATIENT INSTRUCTIONS
Congratulations!! Please review our Pregnancy Essential Guide and [facility] L&D Virtual tour from our [employer]  [facility] Pregnancy Essentials Guide  [facility] ([address] [facility])     [address] (Doris Li  [facility])

## 2023-01-25 NOTE — PROGRESS NOTES
OB INTAKE INTERVIEW  Patient is 27 y o y o  who presents for OB intake at 11wks  She is accompanied by: herself  The father of her baby Maria E Sourav) is involved in the pregnancy and is 28years old    Last Menstrual Period: 2022  Ultrasound: Measured 10 weeks 1 days on 2023 by Ganesh Muse  Estimated Date of Delivery: 2023 changed by 10 week US    Signs/Symptoms of Pregnancy  Current pregnancy symptoms: none  Constipation no  Headaches YES- magnesium and increase H20- using Excedrin with caffeine no ASA  Cramping/spotting no  PICA cravings no    Diabetes-  Body mass index is 25 13 kg/m²  If patient has 1 or more, please order early 1 hour GTT  History of GDM no  BMI >35 no  History of PCOS or current metformin use no  History of LGA/macrosomic infant (4000g/9lbs) no    If patient has 2 or more, please order early 1 hour GTT  BMI>30 no  AMA no  First degree relative with type 2 diabetes no  History of chronic HTN, hyperlipidemia, elevated A1C no  High risk race (, , ,  or ) no    Hypertension- if you answer yes, please order preeclampsia labs (cbc, comprehensive metabolic panel, urine protein creatinine ratio, uric acid)  History of of chronic HTN no  History of gestational HTN no  History of preeclampsia, eclampsia, or HELLP syndrome no  History of diabetes no  History of lupus, autoimmune disease, kidney disease no    Thyroid- if yes order TSH with reflex T4  History of thyroid disease no    Bleeding Disorder or Hx of DVT-patient or first degree relative with history of  Order the following if not done previously     (Factor V, antithrombin III, prothrombin gene mutation, protein C and S Ag, lupus anticoagulant, anticardiolipin, beta-2 glycoprotein)   no    OB/GYN-  History of abnormal pap smear no  History of HPV no  History of Herpes/HSV no  History of other STI (gonorrhea, chlamydia, trich) no  History of prior  no  History of prior  no  History of  delivery prior to 36 weeks 6 days no  History of blood transfusion no  Ok for blood transfusion YES    Substance screening- if yes outside of tobacco for her or anyone in her home-order urine drug screen  History of tobacco use no  Currently using tobacco no  Currently using alcohol no  Presently using drugs no  Past drug use  no  IV drug use-If yes add Hep C antibody to labs no  Partner drug use no  Parent/Family drug use no    MRSA Screening-   Does the pt have a hx of MRSA? no  If yes- please follow MRSA protocol and obtain a nasal swab for MRSA culture    Immunizations:  Influenza vaccine given this season NO  Discussed Tdap vaccine YES  Discussed COVID Vaccine YES    Genetic/MFM-  Do you or your partner have a history of any of the following in yourselves or first degree relatives? Cystic fibrosis no  Spinal muscular atrophy no  Hemoglobinopathy/Sickle Cell/Thalassemia no  Fragile X Intellectual Disability no    If yes, discuss carrier screening and recommend consultation with [facility]/genetic counseling  If no, discuss option for carrier screening and/or genetic testing with Nuchal Ultrasound  Patient interested YES  Appointment at [facility] made YES- NT is  and level 2 is 3/29    Interview education  [facility] Pregnancy Essentials Book reviewed and discussed YES    Nurse/Family Partnership- patient may qualify YES; referral placed NO    Prenatal lab work scripts YES  Extra labs ordered:  none    The patient has a history now or in prior pregnancy notable for:  none      Details that I feel the provider should be aware of: Chanda Dean are expecting baby #1! She is a PA for  [employer]  Doing well aside form her migraines acting up- only thing in her med hx  At end of intake pt asked what she needed to do if she wanted to transfer to [facility]- she said it is closer to her for her appts  She will cx upcoming appts if she decides to switch practices  PN1 visit scheduled   The patient was oriented to our practice, reviewed delivering physicians and [facility] for Delivery  All questions were answered      Interviewed by: Raffaele Galindo MA

## 2023-01-25 NOTE — TELEPHONE ENCOUNTER
Congratulations and completely agree with CORY Desouza  Please do reassure her though that it is likely safe during pregnancy, I would personally myself feel comfortable having botox during a pregnancy, but a good idea to make sure all subjects involved are agreeable to continuing

## 2023-01-25 NOTE — TELEPHONE ENCOUNTER
First of all, Botox is not FDA approved for use in pregnancy, however, the benefit does outweigh risk in various cases  Especially since the botox is not absorbed into the blood stream and would not pass through placenta to fetus  But patient, OB and we need to agree that she would benefit from the injections    Therefore, it depends on how bad her migraines are and how easily they break etc

## 2023-01-25 NOTE — TELEPHONE ENCOUNTER
Patient called and left vm  She is scheduled for botox with Lenice Peabody on 2/10 but is now pregnant   Her OB suggested reaching out to the office to see if she should still proceed    Please advise    Cb#: [phone]

## 2023-01-26 ENCOUNTER — APPOINTMENT (OUTPATIENT)
Dept: LAB | Facility: CLINIC | Age: 28
End: 2023-01-26

## 2023-01-26 DIAGNOSIS — Z34.01 ENCOUNTER FOR SUPERVISION OF NORMAL FIRST PREGNANCY IN FIRST TRIMESTER: ICD-10-CM

## 2023-01-26 LAB
ABO GROUP BLD: NORMAL
BACTERIA UR QL AUTO: ABNORMAL /HPF
BASOPHILS # BLD AUTO: 0.05 THOUSANDS/ÂΜL (ref 0–0.1)
BASOPHILS NFR BLD AUTO: 1 % (ref 0–1)
BILIRUB UR QL STRIP: NEGATIVE
BLD GP AB SCN SERPL QL: NEGATIVE
CLARITY UR: CLEAR
COLOR UR: COLORLESS
EOSINOPHIL # BLD AUTO: 0.11 THOUSAND/ÂΜL (ref 0–0.61)
EOSINOPHIL NFR BLD AUTO: 1 % (ref 0–6)
ERYTHROCYTE [DISTWIDTH] IN BLOOD BY AUTOMATED COUNT: 13.4 % (ref 11.6–15.1)
GLUCOSE UR STRIP-MCNC: ABNORMAL MG/DL
HBV SURFACE AG SER QL: NORMAL
HCT VFR BLD AUTO: 42.6 % (ref 34.8–46.1)
HCV AB SER QL: NORMAL
HGB BLD-MCNC: 14.3 G/DL (ref 11.5–15.4)
HGB UR QL STRIP.AUTO: NEGATIVE
IMM GRANULOCYTES # BLD AUTO: 0.04 THOUSAND/UL (ref 0–0.2)
IMM GRANULOCYTES NFR BLD AUTO: 0 % (ref 0–2)
KETONES UR STRIP-MCNC: ABNORMAL MG/DL
LEUKOCYTE ESTERASE UR QL STRIP: NEGATIVE
LYMPHOCYTES # BLD AUTO: 1.48 THOUSANDS/ÂΜL (ref 0.6–4.47)
LYMPHOCYTES NFR BLD AUTO: 15 % (ref 14–44)
MCH RBC QN AUTO: 29.4 PG (ref 26.8–34.3)
MCHC RBC AUTO-ENTMCNC: 33.6 G/DL (ref 31.4–37.4)
MCV RBC AUTO: 88 FL (ref 82–98)
MONOCYTES # BLD AUTO: 0.8 THOUSAND/ÂΜL (ref 0.17–1.22)
MONOCYTES NFR BLD AUTO: 8 % (ref 4–12)
NEUTROPHILS # BLD AUTO: 7.72 THOUSANDS/ÂΜL (ref 1.85–7.62)
NEUTS SEG NFR BLD AUTO: 75 % (ref 43–75)
NITRITE UR QL STRIP: NEGATIVE
NON-SQ EPI CELLS URNS QL MICRO: ABNORMAL /HPF
NRBC BLD AUTO-RTO: 0 /100 WBCS
PH UR STRIP.AUTO: 5.5 [PH]
PLATELET # BLD AUTO: 267 THOUSANDS/UL (ref 149–390)
PMV BLD AUTO: 9.2 FL (ref 8.9–12.7)
PROT UR STRIP-MCNC: NEGATIVE MG/DL
RBC # BLD AUTO: 4.87 MILLION/UL (ref 3.81–5.12)
RBC #/AREA URNS AUTO: ABNORMAL /HPF
RH BLD: POSITIVE
RUBV IGG SERPL IA-ACNC: 38.2 IU/ML
SP GR UR STRIP.AUTO: 1.01 (ref 1–1.03)
UROBILINOGEN UR STRIP-ACNC: <2 MG/DL
WBC # BLD AUTO: 10.2 THOUSAND/UL (ref 4.31–10.16)
WBC #/AREA URNS AUTO: ABNORMAL /HPF

## 2023-01-26 NOTE — TELEPHONE ENCOUNTER
Left detailed message per communication consent regarding belwo messages    Advised that I did not cancel botox appt yet and asked that she call back to let us know how she would like to proceed

## 2023-01-27 LAB
BACTERIA UR CULT: NORMAL
HIV 1+2 AB+HIV1 P24 AG SERPL QL IA: NORMAL
HIV 2 AB SERPL QL IA: NORMAL
HIV1 AB SERPL QL IA: NORMAL
HIV1 P24 AG SERPL QL IA: NORMAL
RPR SER QL: NORMAL

## 2023-02-05 NOTE — PROGRESS NOTES
Please refer to the [facility] ultrasound report in Ob Procedures for additional information regarding today's visit

## 2023-02-07 ENCOUNTER — ROUTINE PRENATAL (OUTPATIENT)
Facility: HOSPITAL | Age: 28
End: 2023-02-07

## 2023-02-07 VITALS
HEIGHT: 65 IN | WEIGHT: 154.2 LBS | SYSTOLIC BLOOD PRESSURE: 112 MMHG | BODY MASS INDEX: 25.69 KG/M2 | DIASTOLIC BLOOD PRESSURE: 62 MMHG | HEART RATE: 96 BPM

## 2023-02-07 DIAGNOSIS — Z3A.13 13 WEEKS GESTATION OF PREGNANCY: ICD-10-CM

## 2023-02-07 DIAGNOSIS — Z36.82 ENCOUNTER FOR ANTENATAL SCREENING FOR NUCHAL TRANSLUCENCY: Primary | ICD-10-CM

## 2023-02-07 DIAGNOSIS — O26.891 HEADACHE IN PREGNANCY, ANTEPARTUM, FIRST TRIMESTER: ICD-10-CM

## 2023-02-07 DIAGNOSIS — R51.9 HEADACHE IN PREGNANCY, ANTEPARTUM, FIRST TRIMESTER: ICD-10-CM

## 2023-02-07 NOTE — PROGRESS NOTES
Patient chose to have [facility] Non-invasive Prenatal Screen with fetal sex  Patient given brochure and is aware [facility] will contact their insurance and coordinate coverage  Patient made aware she will need to respond to text message or e-mail from [employer] within 2 business days or testing will be run through insurance  Patient informed text message will come from area code  "415"  Provided [employer] # [phone] and web site : [employer]    "[employer] your test online" card with barcode and test tube ID provided to patient  Reviewed [facility]'s web site states 5-7 business days for results via their portal    [employer] message will be sent to patient when MFM receives results /provider reviews  2 vials of blood drawn from RIGHT arm by Dallin Bailey NA/UC  Patient tolerated blood draw without difficulty  Specimens labeled with patient identifiers (name, date of birth, specimen collection date), order and specimen were verified with patient, packed and sent via [employer]  Copy of lab order scanned to Epic media  Maternal Fetal Medicine will have results in approximately 7-10 business days and will call patient or notify via [address]  Patient aware viewing lab result online will reveal fetal sex if ordered  Patient verbalized understanding of all instructions and no questions at this time

## 2023-02-07 NOTE — LETTER
February 7, 2023     [address] [place] [address]  [address]  [facility]    Patient: Alba Llanes   YOB: 1995   Date of Visit: 2/7/2023       Dear Dr Karina Arevalo:    Thank you for referring Alba Llanes to me for evaluation  Below are my notes for this consultation  If you have questions, please do not hesitate to call me  I look forward to following your patient along with you           Sincerely,        Tyrone Zarate MD        CC: No Recipients  Tyrone Zarate MD  2/5/2023 10:16 AM  Sign when Signing Visit  Please refer to the [facility] ultrasound report in Ob Procedures for additional information regarding today's visit

## 2023-02-08 ENCOUNTER — TELEPHONE (OUTPATIENT)
Dept: NEUROLOGY | Facility: CLINIC | Age: 28
End: 2023-02-08

## 2023-02-08 DIAGNOSIS — Z3A.13 13 WEEKS GESTATION OF PREGNANCY: Primary | ICD-10-CM

## 2023-02-08 NOTE — TELEPHONE ENCOUNTER
Called and spoke to patient to confirm their upcoming appointment with Dr Henry Jarquin  Informed patient about arriving in the [place] location 15 minutes prior to their appointment to get checked in and going over chart

## 2023-02-10 ENCOUNTER — PROCEDURE VISIT (OUTPATIENT)
Dept: NEUROLOGY | Facility: CLINIC | Age: 28
End: 2023-02-10

## 2023-02-10 VITALS — TEMPERATURE: 97.7 F | DIASTOLIC BLOOD PRESSURE: 73 MMHG | HEART RATE: 94 BPM | SYSTOLIC BLOOD PRESSURE: 138 MMHG

## 2023-02-10 DIAGNOSIS — G43.709 CHRONIC MIGRAINE WITHOUT AURA WITHOUT STATUS MIGRAINOSUS, NOT INTRACTABLE: Primary | ICD-10-CM

## 2023-02-10 NOTE — PROGRESS NOTES
Universal Protocol   Consent: Verbal consent obtained  Written consent obtained  Risks and benefits: risks, benefits and alternatives were discussed  Consent given by: patient  Time out: Immediately prior to procedure a "time out" was called to verify the correct patient, procedure, equipment, support staff and site/side marked as required  Patient understanding: patient states understanding of the procedure being performed  Patient consent: the patient's understanding of the procedure matches consent given  Procedure consent: procedure consent matches procedure scheduled  Relevant documents: relevant documents present and verified  Patient identity confirmed: verbally with patient        Chemodenervation     Date/Time 2/10/2023 1:40 PM     Performed by  Antoine Alatorre PA-C     Authorized by Antoine Alatorer PA-C        Pre-procedure details      Prepped With: Alcohol     Anesthesia  (see MAR for exact dosages):      Anesthesia method:  None   Procedure details     Position:  Upright   Botox     Botox Type:  Type A    Brand:  Botox    mL's of Botulinum Toxin:  155    Final Concentration per CC:  200 units and 50 units    Needle Gauge:  30 G 2 5 inch   Procedures     Botox Procedures: chronic headache      Indications: migraines     Injection Location      Head / Face:  L superior cervical paraspinal, R superior cervical paraspinal, L , R , L frontalis, R frontalis, L medial occipitalis, R medial occipitalis, procerus, R temporalis, L temporalis, L superior trapezius and R superior trapezius    L  injection amount:  5 unit(s)    R  injection amount:  5 unit(s)    L lateral frontalis:  5 unit(s)    R lateral frontalis:  5 unit(s)    L medial frontalis:  5 unit(s)    R medial frontalis:  5 unit(s)    L temporalis injection amount:  20 unit(s)    R temporalis injection amount:  20 unit(s)    Procerus injection amount:  5 unit(s)    L medial occipitalis injection amount:  15 unit(s)    R medial occipitalis injection amount:  15 unit(s)    L superior cervical paraspinal injection amount:  10 unit(s)    R superior cervical paraspinal injection amount:  10 unit(s)    L superior trapezius injection amount:  15 unit(s)    R superior trapezius injection amount:  15 unit(s)   Total Units     Total units used:  155    Total units discarded:  45   Post-procedure details      Chemodenervation:  Chronic migraine    Facial Nerve Location[de-identified]  Bilateral facial nerve    Patient tolerance of procedure:   Tolerated well, no immediate complications   Comments      All medically necessary

## 2023-02-14 ENCOUNTER — ROUTINE PRENATAL (OUTPATIENT)
Dept: OBGYN CLINIC | Facility: CLINIC | Age: 28
End: 2023-02-14

## 2023-02-14 VITALS — SYSTOLIC BLOOD PRESSURE: 120 MMHG | WEIGHT: 153.4 LBS | DIASTOLIC BLOOD PRESSURE: 82 MMHG | BODY MASS INDEX: 25.53 KG/M2

## 2023-02-14 DIAGNOSIS — Z3A.14 14 WEEKS GESTATION OF PREGNANCY: ICD-10-CM

## 2023-02-14 DIAGNOSIS — Z34.02 ENCOUNTER FOR SUPERVISION OF NORMAL FIRST PREGNANCY, SECOND TRIMESTER: Primary | ICD-10-CM

## 2023-02-14 LAB
C TRACH DNA SPEC QL NAA+PROBE: NEGATIVE
N GONORRHOEA DNA SPEC QL NAA+PROBE: NEGATIVE

## 2023-02-14 NOTE — PROGRESS NOTES
Routine prenatal, 14 weeks  Pt is well, has questions regarding sleep position and is tylenol okay for migraines? Pt will complete G/C today

## 2023-02-14 NOTE — PROGRESS NOTES
Patient is a 29 YO  female presenting to the office at 14 1 weeks for routine OB care  BP: 120/82  TWlb  Fetal Movement: none yet  Feeling OK today  C/o persistent headaches  Hx of migraines, worse now in pregnancy  Taking tylenol, declines fioricet at this time  Patient is a 14 week transfer from [facility]  Completed her NT  NIPT ordered and pending results  AFP ordered and patient aware of timing of test     28 y o  Mariola Jose Maria female at 14w1d (Estimated Date of Delivery: 23) for PNV  Pre- Vitals    Flowsheet Row Most Recent Value   Prenatal Assessment    Prenatal Vitals    Blood Pressure 120/82   Weight - Scale 69 6 kg (153 lb 6 4 oz)   Urine Albumin/Glucose    Dilation/Effacement/Station    Vaginal Drainage    Edema          kg (2 lb 6 4 oz)    Cramping: no  Bleeding: no  LOF: no  NT/13 week scan scheduled: yes  Anatomy scan scheduled   AFP ordered if indicated: yes  Prenatal labs complete (including Heb B, HIV): yes; date completed 2023  Pap collected: UTD  GC collected:yes  OK to transfuse and code  Oriented to practice/delivery location  Call for concerns  RTO 4 weeks      Preventative  Cyproheptadine 4mg at bedtime, can take in AM after breakfast as well as at bedtime  (preventative)  Riboflavin 400mg daily (preventative)  Magnesium 500mg daily (preventative)     Acute migraine  Tylenol PRN, max 3,000mg  Fioricet every 12 hrs x 2 doses with Reglan 10mg every 12 hrs, no more that twice a week

## 2023-03-06 ENCOUNTER — LAB (OUTPATIENT)
Dept: LAB | Facility: CLINIC | Age: 28
End: 2023-03-06

## 2023-03-06 DIAGNOSIS — Z3A.13 13 WEEKS GESTATION OF PREGNANCY: ICD-10-CM

## 2023-03-09 LAB
2ND TRIMESTER 4 SCREEN SERPL-IMP: NORMAL
AFP ADJ MOM SERPL: 1.38
AFP INTERP AMN-IMP: NORMAL
AFP INTERP SERPL-IMP: NORMAL
AFP INTERP SERPL-IMP: NORMAL
AFP SERPL-MCNC: 53.4 NG/ML
AGE AT DELIVERY: 28.8 YR
GA METHOD: NORMAL
GA: 17 WEEKS
IDDM PATIENT QL: NO
MULTIPLE PREGNANCY: NO
NEURAL TUBE DEFECT RISK FETUS: 3810 %

## 2023-03-10 NOTE — RESULT ENCOUNTER NOTE
Dae Hodges,      Your AFP screen is negative  Please contact the office at [phone] with any questions       Saman

## 2023-03-14 ENCOUNTER — ROUTINE PRENATAL (OUTPATIENT)
Dept: OBGYN CLINIC | Facility: CLINIC | Age: 28
End: 2023-03-14

## 2023-03-14 VITALS — DIASTOLIC BLOOD PRESSURE: 72 MMHG | BODY MASS INDEX: 25.96 KG/M2 | WEIGHT: 156 LBS | SYSTOLIC BLOOD PRESSURE: 112 MMHG

## 2023-03-14 DIAGNOSIS — G43.009 MIGRAINE WITHOUT AURA AND WITHOUT STATUS MIGRAINOSUS, NOT INTRACTABLE: Primary | ICD-10-CM

## 2023-03-14 DIAGNOSIS — Z3A.18 18 WEEKS GESTATION OF PREGNANCY: ICD-10-CM

## 2023-03-14 RX ORDER — RIBOFLAVIN (VITAMIN B2) 400 MG
1 TABLET ORAL DAILY
Qty: 30 TABLET | Refills: 5 | Status: SHIPPED | OUTPATIENT
Start: 2023-03-14 | End: 2023-04-13

## 2023-03-14 RX ORDER — CYPROHEPTADINE HYDROCHLORIDE 4 MG/1
4 TABLET ORAL
Qty: 30 TABLET | Refills: 5 | Status: SHIPPED | OUTPATIENT
Start: 2023-03-14 | End: 2023-04-13

## 2023-03-14 NOTE — PROGRESS NOTES
28 y o  Zaid Dean female at 18w1d (Estimated Date of Delivery: 23) for PNV  Pre-Maria Luz Vitals    Flowsheet Row Most Recent Value   Prenatal Assessment    Movement Absent   Prenatal Vitals    Blood Pressure 112/72   Weight - Scale 70 8 kg (156 lb)   Urine Albumin/Glucose    Dilation/Effacement/Station    Vaginal Drainage    Edema          kg (5 lb)  On-going issues with migraines  Had first botox treatment, next in may  Currently taking 1 excedrin and magnesium daily  Will Rx riboflavin and cyproheptadine  Scheduled for Level II u/s  AFP and NIPT are wnl

## 2023-03-29 ENCOUNTER — ROUTINE PRENATAL (OUTPATIENT)
Facility: HOSPITAL | Age: 28
End: 2023-03-29

## 2023-03-29 VITALS
BODY MASS INDEX: 26.76 KG/M2 | HEIGHT: 65 IN | HEART RATE: 86 BPM | WEIGHT: 160.6 LBS | SYSTOLIC BLOOD PRESSURE: 132 MMHG | DIASTOLIC BLOOD PRESSURE: 76 MMHG

## 2023-03-29 DIAGNOSIS — Z36.86 ENCOUNTER FOR ANTENATAL SCREENING FOR CERVICAL LENGTH: ICD-10-CM

## 2023-03-29 DIAGNOSIS — Z36.3 ENCOUNTER FOR ANTENATAL SCREENING FOR MALFORMATION: Primary | ICD-10-CM

## 2023-03-29 DIAGNOSIS — Z3A.20 20 WEEKS GESTATION OF PREGNANCY: ICD-10-CM

## 2023-03-29 PROBLEM — I10 ELEVATED BLOOD PRESSURE READING IN OFFICE WITH DIAGNOSIS OF HYPERTENSION: Status: ACTIVE | Noted: 2023-03-29

## 2023-03-29 NOTE — PROGRESS NOTES
"[address]: Ms Raj Whitman was seen today for anatomic survey and cervical length screening ultrasound  See ultrasound report under \"OB Procedures\" tab  The time spent on this established patient on the encounter date included 5 minutes previsit service time reviewing records and precharting, 5 minutes face-to-face service time counseling regarding results and coordinating care, and  5 minutes charting, totalling 15 minutes  Please don't hesitate to contact our office with any concerns or questions    -Ramirez Grossman MD      "

## 2023-04-12 PROBLEM — Z3A.22 22 WEEKS GESTATION OF PREGNANCY: Status: ACTIVE | Noted: 2023-01-13

## 2023-04-26 ENCOUNTER — TELEPHONE (OUTPATIENT)
Dept: NEUROLOGY | Facility: CLINIC | Age: 28
End: 2023-04-26

## 2023-04-26 NOTE — TELEPHONE ENCOUNTER
Submitted Re-Authorization request via fax to [facility] for:     Botox-200 units: [id number], [id number]  DX: [phone], Chronic Migraine  Awaiting approval/denial response  Will follow up on the status of pending authorization requested

## 2023-04-27 NOTE — TELEPHONE ENCOUNTER
Received fax from pt plan [facility] ([facility]) with approval details below  Approved   Botox 200 UNITS  Qty  1  Auth#[id number]  Valid:5/1/2023-11/1/2023  Visits: 2    Please use Stock

## 2023-05-10 ENCOUNTER — ROUTINE PRENATAL (OUTPATIENT)
Dept: OBGYN CLINIC | Facility: CLINIC | Age: 28
End: 2023-05-10

## 2023-05-10 ENCOUNTER — LAB (OUTPATIENT)
Dept: LAB | Facility: CLINIC | Age: 28
End: 2023-05-10

## 2023-05-10 VITALS — WEIGHT: 161.6 LBS | SYSTOLIC BLOOD PRESSURE: 120 MMHG | DIASTOLIC BLOOD PRESSURE: 70 MMHG | BODY MASS INDEX: 26.89 KG/M2

## 2023-05-10 DIAGNOSIS — Z34.92 PRENATAL CARE, SECOND TRIMESTER: Primary | ICD-10-CM

## 2023-05-10 DIAGNOSIS — Z3A.22 22 WEEKS GESTATION OF PREGNANCY: ICD-10-CM

## 2023-05-10 DIAGNOSIS — Z34.02 ENCOUNTER FOR SUPERVISION OF NORMAL FIRST PREGNANCY IN SECOND TRIMESTER: ICD-10-CM

## 2023-05-10 LAB
ERYTHROCYTE [DISTWIDTH] IN BLOOD BY AUTOMATED COUNT: 14.1 % (ref 11.6–15.1)
GLUCOSE 1H P 50 G GLC PO SERPL-MCNC: 95 MG/DL (ref 40–134)
HCT VFR BLD AUTO: 40 % (ref 34.8–46.1)
HGB BLD-MCNC: 12.8 G/DL (ref 11.5–15.4)
MCH RBC QN AUTO: 29.9 PG (ref 26.8–34.3)
MCHC RBC AUTO-ENTMCNC: 32 G/DL (ref 31.4–37.4)
MCV RBC AUTO: 94 FL (ref 82–98)
PLATELET # BLD AUTO: 213 THOUSANDS/UL (ref 149–390)
PMV BLD AUTO: 9.3 FL (ref 8.9–12.7)
RBC # BLD AUTO: 4.28 MILLION/UL (ref 3.81–5.12)
TREPONEMA PALLIDUM IGG+IGM AB [PRESENCE] IN SERUM OR PLASMA BY IMMUNOASSAY: NORMAL
WBC # BLD AUTO: 7.14 THOUSAND/UL (ref 4.31–10.16)

## 2023-05-10 NOTE — RESULT ENCOUNTER NOTE
Congratulations! You have passed your glucose test  Your blood count looks good  Please contact the office at [phone] with any questions

## 2023-05-10 NOTE — PROGRESS NOTES
Routine prenatal  Pt would like to discuss pelvic pressure  Denies vb, lo , and ctx   Urine dip test neg protein/ neg glucose

## 2023-05-10 NOTE — PROGRESS NOTES
28 y o  Lacey Comes female at 26w2d (Estimated Date of Delivery: 23) for PNV  Pre-Maria Luz Vitals    Flowsheet Row Most Recent Value   Prenatal Assessment    Fetal Heart Rate 140   Movement Present   Prenatal Vitals    Blood Pressure 120/70   Weight - Scale 73 3 kg (161 lb 9 6 oz)   Urine Albumin/Glucose    Dilation/Effacement/Station    Vaginal Drainage    Edema          kg (10 lb 9 6 oz)    Leakage of fluid: no  Vaginal bleeding: no  Contractions/Cramping: no  Fetal movement: yes  28 wk labs performed today  Normal results  GTT 95, Hgb 12 8, plts 213  Reviewed common discomforts of pregnancy including pelvic pressure  Reviewed possible interventions, like pelvic support belt and K tape to help  Discussed pelvic floor PT postpartum as well  RTO in 2 weeks

## 2023-05-12 ENCOUNTER — PROCEDURE VISIT (OUTPATIENT)
Dept: NEUROLOGY | Facility: CLINIC | Age: 28
End: 2023-05-12

## 2023-05-12 VITALS — HEART RATE: 95 BPM | SYSTOLIC BLOOD PRESSURE: 138 MMHG | DIASTOLIC BLOOD PRESSURE: 75 MMHG | TEMPERATURE: 98.4 F

## 2023-05-12 DIAGNOSIS — G43.709 CHRONIC MIGRAINE WITHOUT AURA WITHOUT STATUS MIGRAINOSUS, NOT INTRACTABLE: Primary | ICD-10-CM

## 2023-05-12 NOTE — PROGRESS NOTES
"  Universal Protocol   Consent: Verbal consent obtained  Written consent obtained  Risks and benefits: risks, benefits and alternatives were discussed  Consent given by: patient  Time out: Immediately prior to procedure a \"time out\" was called to verify the correct patient, procedure, equipment, support staff and site/side marked as required  Patient understanding: patient states understanding of the procedure being performed  Patient consent: the patient's understanding of the procedure matches consent given  Procedure consent: procedure consent matches procedure scheduled  Relevant documents: relevant documents present and verified  Patient identity confirmed: verbally with patient        Chemodenervation     Date/Time 5/12/2023 2:15 PM     Performed by  Radha Holden PA-C     Authorized by Radha Holden PA-C        Pre-procedure details      Prepped With: Alcohol     Anesthesia  (see MAR for exact dosages):      Anesthesia method:  None   Procedure details     Position:  Upright   Botox     Botox Type:  Type A    Brand:  Botox    mL's of Botulinum Toxin:  200    Final Concentration per CC:  50 units    Needle Gauge:  30 G 2 5 inch   Procedures     Botox Procedures: chronic headache      Indications: migraines     Injection Location      Head / Face:  L superior cervical paraspinal, R superior cervical paraspinal, L , R , L frontalis, R frontalis, L medial occipitalis, R medial occipitalis, procerus, R temporalis, L temporalis, R superior trapezius and L superior trapezius    L  injection amount:  5 unit(s)    R  injection amount:  5 unit(s)    L lateral frontalis:  5 unit(s)    R lateral frontalis:  5 unit(s)    L medial frontalis:  5 unit(s)    R medial frontalis:  5 unit(s)    L temporalis injection amount:  20 unit(s)    R temporalis injection amount:  20 unit(s)    Procerus injection amount:  5 unit(s)    L medial occipitalis injection amount:  15 unit(s)    R medial " occipitalis injection amount:  15 unit(s)    L superior cervical paraspinal injection amount:  10 unit(s)    R superior cervical paraspinal injection amount:  10 unit(s)    L superior trapezius injection amount:  15 unit(s)    R superior trapezius injection amount:  15 unit(s)   Total Units     Total units used:  200    Total units discarded:  0   Post-procedure details      Chemodenervation:  Chronic migraine    Facial Nerve Location[de-identified]  Bilateral facial nerve    Patient tolerance of procedure:   Tolerated well, no immediate complications   Comments      45 units frontalis  All medically necessary

## 2023-05-22 ENCOUNTER — ROUTINE PRENATAL (OUTPATIENT)
Dept: OBGYN CLINIC | Facility: CLINIC | Age: 28
End: 2023-05-22

## 2023-05-22 VITALS — BODY MASS INDEX: 27.32 KG/M2 | DIASTOLIC BLOOD PRESSURE: 62 MMHG | WEIGHT: 164.2 LBS | SYSTOLIC BLOOD PRESSURE: 104 MMHG

## 2023-05-22 DIAGNOSIS — Z23 NEED FOR TDAP VACCINATION: ICD-10-CM

## 2023-05-22 DIAGNOSIS — Z34.03 ENCOUNTER FOR SUPERVISION OF NORMAL FIRST PREGNANCY IN THIRD TRIMESTER: Primary | ICD-10-CM

## 2023-05-22 DIAGNOSIS — Z3A.28 28 WEEKS GESTATION OF PREGNANCY: ICD-10-CM

## 2023-05-22 NOTE — PROGRESS NOTES
Routine prenatal 28 weeks, red folder, tdap, and breast pump  Pt is well, states there are no concerns  Red folder reviewed, will bring paperwork next visit

## 2023-05-22 NOTE — PROGRESS NOTES
This is a 28 y o  [place] Needs at 28w0d who presents for return OB visit  No complaints  Denies contractions, leakage, bleeding  Endorses fetal movement  BP: 104/62 TWlb   28 wk labs reviewed  TDAP offered and accepted  Rhogam not indicated  [address] reviewed  Skin to skin, rooming in, delayed cord clamp,  pain management in labor discussed  Consent signed  Full code   OK with blood transfusion   F/up 2 wk

## 2023-05-24 ENCOUNTER — APPOINTMENT (OUTPATIENT)
Dept: LAB | Facility: CLINIC | Age: 28
End: 2023-05-24

## 2023-05-24 DIAGNOSIS — Z00.8 HEALTH EXAMINATION IN POPULATION SURVEY: ICD-10-CM

## 2023-05-24 LAB
CHOLEST SERPL-MCNC: 254 MG/DL
DME PARACHUTE DELIVERY DATE ACTUAL: NORMAL
DME PARACHUTE DELIVERY DATE REQUESTED: NORMAL
DME PARACHUTE ITEM DESCRIPTION: NORMAL
DME PARACHUTE ORDER STATUS: NORMAL
DME PARACHUTE SUPPLIER NAME: NORMAL
DME PARACHUTE SUPPLIER PHONE: NORMAL
EST. AVERAGE GLUCOSE BLD GHB EST-MCNC: 80 MG/DL
HBA1C MFR BLD: 4.4 %
HDLC SERPL-MCNC: 100 MG/DL
LDLC SERPL CALC-MCNC: 116 MG/DL (ref 0–100)
NONHDLC SERPL-MCNC: 154 MG/DL
TRIGL SERPL-MCNC: 190 MG/DL

## 2023-06-03 ENCOUNTER — OFFICE VISIT (OUTPATIENT)
Dept: URGENT CARE | Facility: CLINIC | Age: 28
End: 2023-06-03

## 2023-06-03 VITALS
OXYGEN SATURATION: 97 % | DIASTOLIC BLOOD PRESSURE: 88 MMHG | RESPIRATION RATE: 20 BRPM | HEART RATE: 101 BPM | TEMPERATURE: 99.1 F | SYSTOLIC BLOOD PRESSURE: 138 MMHG

## 2023-06-03 DIAGNOSIS — Z11.52 ENCOUNTER FOR SCREENING FOR COVID-19: ICD-10-CM

## 2023-06-03 DIAGNOSIS — J06.9 VIRAL UPPER RESPIRATORY TRACT INFECTION: Primary | ICD-10-CM

## 2023-06-03 NOTE — PATIENT INSTRUCTIONS
--Rest, drink plenty of fluids  --COVID and flu testing sent  Will call with results  Average turn around time is 24-48 hours  --Advise self-quarantining until you hear back from us regarding test results (at the earliest)  This involves not leaving your house, wearing a mask at all times while outside your immediate living area, and disinfecting all commonly used surfaces and personal items  If your COVID test results are positive, you will need to self-quarantine at home for at least 5 days from the onset of your symptoms, until you are feeling better, and until you are without a fever for at least 24 hours  --For nasal/sinus congestion, helpful measures include steam, warm compresses, an OTC saline nasal spray   --For cough, you can use a cool mist humidifier (with or without Vicks) in the bedroom at night  --For sore throat, you can take OTC lozenges, use warm gargles (salt water or apple cider vinegar and honey), herbal teas, or an OTC throat spray (Chloraseptic)  --You should contact your primary care provider and/or go to the ER if your symptoms are not improved or get worse over the next 7 days  This includes new onset fever, localized ear pain, sinus pain, as well as worsening cough, chest pain, shortness of breath, or significant weakness/fatigue

## 2023-06-03 NOTE — PROGRESS NOTES
[facility]        NAME: Gretchen Jara is a 28 y o  female  : 1995    MRN: [id number]  DATE: Macy 3, 2023  TIME: 2:42 PM    Assessment and Plan   Viral upper respiratory tract infection [J06 9]  1  Viral upper respiratory tract infection        2  Encounter for screening for COVID-19  Covid19 and INFLUENZA A/B PCR        --Advised to avoid all OTC cold medications unless OK'd by her OB-GYN (other than per below)    Patient Instructions     --Rest, drink plenty of fluids  --COVID and flu testing sent  Will call with results  Average turn around time is 24-48 hours  --Advise self-quarantining until you hear back from us regarding test results (at the earliest)  This involves not leaving your house, wearing a mask at all times while outside your immediate living area, and disinfecting all commonly used surfaces and personal items  If your COVID test results are positive, you will need to self-quarantine at home for at least 5 days from the onset of your symptoms, until you are feeling better, and until you are without a fever for at least 24 hours  --For nasal/sinus congestion, helpful measures include steam, warm compresses, an OTC saline nasal spray   --For cough, you can use a cool mist humidifier (with or without Vicks) in the bedroom at night  --For sore throat, you can take OTC lozenges, use warm gargles (salt water or apple cider vinegar and honey), herbal teas, or an OTC throat spray (Chloraseptic)  --You should contact your primary care provider and/or go to the ER if your symptoms are not improved or get worse over the next 7 days  This includes new onset fever, localized ear pain, sinus pain, as well as worsening cough, chest pain, shortness of breath, or significant weakness/fatigue           Chief Complaint     Chief Complaint   Patient presents with   • Cough   • Generalized Body Aches       Pt presents with fevers that started last night and generalized body aches, pt also with mostly dry cough  History of Present Illness       Here with complaints of nasal congestion, rhinorrhea, sore throat, mildly productive cough, body aches since yesterday  Initial low grade fever (101)  No dyspnea  No ear pain  No abdominal pain, N/V/D  Taking Tylenol  No known sick contacts  Currently 30 weeks pregnant  Review of Systems   Review of Systems   Constitutional: Positive for fever  HENT: Positive for rhinorrhea and sore throat  Negative for ear pain  Respiratory: Positive for cough  Gastrointestinal: Negative for abdominal pain, nausea and vomiting  Musculoskeletal: Positive for myalgias  Neurological: Negative for headaches  Current Medications       Current Outpatient Medications:   •  Acetaminophen-Caffeine (EXCEDRIN TENSION HEADACHE PO), Take by mouth, Disp: , Rfl:   •  Prenatal MV-Min-Fe Fum-FA-DHA (PRENATAL+DHA PO), Take by mouth, Disp: , Rfl:     Current Allergies     Allergies as of 06/03/2023 - Reviewed 06/03/2023   Allergen Reaction Noted   • Lactose intolerance (gi) - food allergy GI Intolerance 10/12/2015            The following portions of the patient's history were reviewed and updated as appropriate: allergies, current medications, past family history, past medical history, past social history, past surgical history and problem list      Past Medical History:   Diagnosis Date   • Migraine        Past Surgical History:   Procedure Laterality Date   • ANKLE SURGERY     • WISDOM TOOTH EXTRACTION         Family History   Problem Relation Age of Onset   • Arrhythmia Mother    • Hyperlipidemia Father    • No Known Problems Sister    • No Known Problems Maternal Grandmother    • Alzheimer's disease Maternal Grandfather    • Breast cancer Paternal Grandmother    • Dementia Paternal Grandfather    • Colon cancer Maternal Uncle          Medications have been verified          Objective   /88   Pulse 101   Temp 99 1 °F (37 3 °C) Resp 20   LMP 11/07/2022   SpO2 97%   Patient's last menstrual period was 11/07/2022  Physical Exam     Physical Exam  Constitutional:       General: She is not in acute distress  Appearance: Normal appearance  She is well-developed  She is not ill-appearing, toxic-appearing or diaphoretic  HENT:      Head: Normocephalic  Right Ear: Tympanic membrane, ear canal and external ear normal       Left Ear: Tympanic membrane, ear canal and external ear normal       Nose: Congestion and rhinorrhea present  Comments: No sinus tenderness  Mouth/Throat:      Pharynx: Posterior oropharyngeal erythema present  No oropharyngeal exudate  Comments: Tonsils 1+, erythematous, without exudate  Eyes:      General:         Right eye: No discharge  Left eye: No discharge  Cardiovascular:      Rate and Rhythm: Normal rate and regular rhythm  Heart sounds: Normal heart sounds  No murmur heard  Pulmonary:      Effort: Pulmonary effort is normal  No respiratory distress  Breath sounds: Normal breath sounds  No stridor  No wheezing, rhonchi or rales  Chest:      Chest wall: No tenderness  Abdominal:      Tenderness: There is no abdominal tenderness  Musculoskeletal:      Cervical back: Neck supple  Lymphadenopathy:      Cervical: No cervical adenopathy  Skin:     General: Skin is warm and dry  Neurological:      Mental Status: She is alert and oriented to person, place, and time  Deep Tendon Reflexes: Reflexes are normal and symmetric     Psychiatric:         Mood and Affect: Mood normal

## 2023-06-05 ENCOUNTER — CLINICAL SUPPORT (OUTPATIENT)
Dept: POSTPARTUM | Facility: CLINIC | Age: 28
End: 2023-06-05

## 2023-06-05 DIAGNOSIS — Z32.2 ENCOUNTER FOR CHILDBIRTH INSTRUCTION: Primary | ICD-10-CM

## 2023-06-05 LAB
FLUAV RNA RESP QL NAA+PROBE: NEGATIVE
FLUBV RNA RESP QL NAA+PROBE: NEGATIVE
SARS-COV-2 RNA RESP QL NAA+PROBE: POSITIVE

## 2023-06-06 ENCOUNTER — ROUTINE PRENATAL (OUTPATIENT)
Dept: OBGYN CLINIC | Facility: CLINIC | Age: 28
End: 2023-06-06

## 2023-06-06 VITALS — DIASTOLIC BLOOD PRESSURE: 62 MMHG | SYSTOLIC BLOOD PRESSURE: 110 MMHG | BODY MASS INDEX: 26.79 KG/M2 | WEIGHT: 161 LBS

## 2023-06-06 DIAGNOSIS — O98.513 COVID-19 AFFECTING PREGNANCY IN THIRD TRIMESTER: Primary | ICD-10-CM

## 2023-06-06 DIAGNOSIS — U07.1 COVID-19 AFFECTING PREGNANCY IN THIRD TRIMESTER: Primary | ICD-10-CM

## 2023-06-06 DIAGNOSIS — Z3A.30 30 WEEKS GESTATION OF PREGNANCY: ICD-10-CM

## 2023-06-06 PROCEDURE — PNV: Performed by: OBSTETRICS & GYNECOLOGY

## 2023-06-06 NOTE — PROGRESS NOTES
28 y o    female at 30 3 wga for PNV  BP : 110/62  TWG: 10  Doing OK    COVID positive - just fatigue  Mild pain on uterus - likely position of baby  Reviewed PTL precautions and FKCs  Growth at 36  F/u 2 weeks

## 2023-06-07 ENCOUNTER — CLINICAL SUPPORT (OUTPATIENT)
Dept: POSTPARTUM | Facility: CLINIC | Age: 28
End: 2023-06-07

## 2023-06-07 DIAGNOSIS — Z32.2 ENCOUNTER FOR CHILDBIRTH INSTRUCTION: Primary | ICD-10-CM

## 2023-06-10 ENCOUNTER — CLINICAL SUPPORT (OUTPATIENT)
Age: 28
End: 2023-06-10

## 2023-06-10 DIAGNOSIS — Z32.2 ENCOUNTER FOR CHILDBIRTH INSTRUCTION: Primary | ICD-10-CM

## 2023-06-14 ENCOUNTER — CLINICAL SUPPORT (OUTPATIENT)
Dept: POSTPARTUM | Facility: CLINIC | Age: 28
End: 2023-06-14

## 2023-06-14 ENCOUNTER — ULTRASOUND (OUTPATIENT)
Facility: HOSPITAL | Age: 28
End: 2023-06-14
Payer: COMMERCIAL

## 2023-06-14 VITALS
HEART RATE: 82 BPM | DIASTOLIC BLOOD PRESSURE: 78 MMHG | BODY MASS INDEX: 27.39 KG/M2 | HEIGHT: 65 IN | SYSTOLIC BLOOD PRESSURE: 128 MMHG | WEIGHT: 164.4 LBS

## 2023-06-14 DIAGNOSIS — Z36.2 ENCOUNTER FOR FOLLOW-UP ULTRASOUND OF FETAL ANATOMY: ICD-10-CM

## 2023-06-14 DIAGNOSIS — U07.1 COVID-19 AFFECTING PREGNANCY IN THIRD TRIMESTER: ICD-10-CM

## 2023-06-14 DIAGNOSIS — Z32.2 ENCOUNTER FOR CHILDBIRTH INSTRUCTION: Primary | ICD-10-CM

## 2023-06-14 DIAGNOSIS — Z3A.31 31 WEEKS GESTATION OF PREGNANCY: Primary | ICD-10-CM

## 2023-06-14 DIAGNOSIS — O98.513 COVID-19 AFFECTING PREGNANCY IN THIRD TRIMESTER: ICD-10-CM

## 2023-06-14 DIAGNOSIS — Z36.89 ENCOUNTER FOR ULTRASOUND TO CHECK FETAL GROWTH: ICD-10-CM

## 2023-06-14 PROCEDURE — 76816 OB US FOLLOW-UP PER FETUS: CPT | Performed by: STUDENT IN AN ORGANIZED HEALTH CARE EDUCATION/TRAINING PROGRAM

## 2023-06-14 NOTE — PROGRESS NOTES
"[address]: Ms Emma Hager was seen today for fetal growth and followup missed anatomy ultrasound  See ultrasound report under \"OB Procedures\" tab  Please don't hesitate to contact our office with any concerns or questions    -Leticia Hernandes MD      "

## 2023-06-17 ENCOUNTER — CLINICAL SUPPORT (OUTPATIENT)
Age: 28
End: 2023-06-17

## 2023-06-17 DIAGNOSIS — Z32.2 ENCOUNTER FOR CHILDBIRTH INSTRUCTION: Primary | ICD-10-CM

## 2023-06-21 ENCOUNTER — ROUTINE PRENATAL (OUTPATIENT)
Dept: OBGYN CLINIC | Facility: CLINIC | Age: 28
End: 2023-06-21

## 2023-06-21 VITALS — SYSTOLIC BLOOD PRESSURE: 110 MMHG | WEIGHT: 165 LBS | BODY MASS INDEX: 27.46 KG/M2 | DIASTOLIC BLOOD PRESSURE: 74 MMHG

## 2023-06-21 DIAGNOSIS — Z3A.32 32 WEEKS GESTATION OF PREGNANCY: ICD-10-CM

## 2023-06-21 DIAGNOSIS — U07.1 COVID-19 AFFECTING PREGNANCY IN THIRD TRIMESTER: Primary | ICD-10-CM

## 2023-06-21 DIAGNOSIS — O98.513 COVID-19 AFFECTING PREGNANCY IN THIRD TRIMESTER: Primary | ICD-10-CM

## 2023-06-21 PROCEDURE — PNV: Performed by: OBSTETRICS & GYNECOLOGY

## 2023-06-21 NOTE — PROGRESS NOTES
28 y o  Leonela Florence female at [place] [address] (Estimated Date of Delivery: 23) for PNV  Pre-Maria Luz Vitals    Flowsheet Row Most Recent Value   Prenatal Assessment    Fetal Heart Rate 130   Movement Present   Prenatal Vitals    Blood Pressure 110/74   Weight - Scale 74 8 kg (165 lb)   Urine Albumin/Glucose    Dilation/Effacement/Station    Vaginal Drainage    Edema         TW 35 kg (14 lb)    Leakage of fluid: no  Vaginal bleeding: no  Contractions/Cramping: no  Occasional pressure  Fetal movement: yes  Recovering from COVID, symptoms improving  Having pain on insertion with intercourse, discussed topical coconut oil and aquaphor that may help with her discomfort  Discussed perineal massage recommendations and provided instructions in AVS    RTO in 2 weeks

## 2023-06-21 NOTE — PROGRESS NOTES
Pt is here for her 32w prenatal  Pt denies any swelling,leakage,bleeding, and contractions  Pt states she has some random pressure  Pt doing wel no concerns

## 2023-06-27 ENCOUNTER — CLINICAL SUPPORT (OUTPATIENT)
Age: 28
End: 2023-06-27

## 2023-06-27 DIAGNOSIS — Z32.2 ENCOUNTER FOR CHILDBIRTH INSTRUCTION: Primary | ICD-10-CM

## 2023-07-05 ENCOUNTER — ROUTINE PRENATAL (OUTPATIENT)
Dept: OBGYN CLINIC | Facility: CLINIC | Age: 28
End: 2023-07-05

## 2023-07-05 VITALS — DIASTOLIC BLOOD PRESSURE: 72 MMHG | BODY MASS INDEX: 28.12 KG/M2 | WEIGHT: 169 LBS | SYSTOLIC BLOOD PRESSURE: 118 MMHG

## 2023-07-05 DIAGNOSIS — Z34.03 ENCOUNTER FOR SUPERVISION OF NORMAL FIRST PREGNANCY, THIRD TRIMESTER: Primary | ICD-10-CM

## 2023-07-05 DIAGNOSIS — Z3A.34 34 WEEKS GESTATION OF PREGNANCY: ICD-10-CM

## 2023-07-05 PROCEDURE — PNV: Performed by: PHYSICIAN ASSISTANT

## 2023-07-05 NOTE — PROGRESS NOTES
Patient is a 27 YO  female presenting to the office at 34.0 weeks for routine OB care.    BP: 118/72  TWlb  Fetal Movement: yes good movement  Feeling well today  Denies LOF, CTX, VB  Discussed gbs and gc next visit  Reviewed precautions  Call for concerns  RTO 2 weeks

## 2023-07-05 NOTE — PROGRESS NOTES
Pt is here for her 34w prenatal. Pt denies any swelling,leakage,bleeding and contractions. Pt has some random pressure and some mild cramps. Pt doing well no concerns.

## 2023-07-19 ENCOUNTER — ROUTINE PRENATAL (OUTPATIENT)
Dept: OBGYN CLINIC | Facility: CLINIC | Age: 28
End: 2023-07-19

## 2023-07-19 VITALS — SYSTOLIC BLOOD PRESSURE: 116 MMHG | BODY MASS INDEX: 28.29 KG/M2 | WEIGHT: 170 LBS | DIASTOLIC BLOOD PRESSURE: 64 MMHG

## 2023-07-19 DIAGNOSIS — Z34.03 ENCOUNTER FOR SUPERVISION OF NORMAL FIRST PREGNANCY, THIRD TRIMESTER: Primary | ICD-10-CM

## 2023-07-19 DIAGNOSIS — Z3A.36 36 WEEKS GESTATION OF PREGNANCY: ICD-10-CM

## 2023-07-19 PROCEDURE — 87150 DNA/RNA AMPLIFIED PROBE: CPT | Performed by: OBSTETRICS & GYNECOLOGY

## 2023-07-19 PROCEDURE — 87591 N.GONORRHOEAE DNA AMP PROB: CPT | Performed by: OBSTETRICS & GYNECOLOGY

## 2023-07-19 PROCEDURE — 87491 CHLMYD TRACH DNA AMP PROBE: CPT | Performed by: OBSTETRICS & GYNECOLOGY

## 2023-07-19 PROCEDURE — PNV: Performed by: OBSTETRICS & GYNECOLOGY

## 2023-07-19 NOTE — PROGRESS NOTES
28 y.o. Pratibha Walsh female at 41w1d (Estimated Date of Delivery: 23) for PNV. Pre- Vitals    Flowsheet Row Most Recent Value   Prenatal Assessment    Fetal Heart Rate 140   Movement Present   Presentation Vertex   Prenatal Vitals    Blood Pressure 116/64   Weight - Scale 77.1 kg (170 lb)   Urine Albumin/Glucose    Dilation/Effacement/Station    Cervical Dilation 0   Cervical Effacement 50   Fetal Station -2   Vaginal Drainage    Edema         TW.618 kg (19 lb)    Leakage of fluid: no  Vaginal bleeding: no  Contractions/Cramping: no  Fetal movement: yes    GBS done: Yes  PCN allergy: No  Chlamydia/gonorrhea swab done: yes  Delivery plan: IOL after [facility]    Labor precautions given. 606/706 [address]adsen reviewed. RTO in 1 weeks.

## 2023-07-20 LAB
C TRACH DNA SPEC QL NAA+PROBE: NEGATIVE
N GONORRHOEA DNA SPEC QL NAA+PROBE: NEGATIVE

## 2023-07-21 LAB — GP B STREP DNA SPEC QL NAA+PROBE: NEGATIVE

## 2023-07-22 NOTE — RESULT ENCOUNTER NOTE
Ann-Marie Simon    Your GBS swab is negative, therefore you do not need antibiotics in labor. Your chlamydia and gonorrhea testing is negative. Please contact the office with any questions.      Saman

## 2023-07-28 ENCOUNTER — ROUTINE PRENATAL (OUTPATIENT)
Dept: OBGYN CLINIC | Facility: CLINIC | Age: 28
End: 2023-07-28

## 2023-07-28 VITALS — DIASTOLIC BLOOD PRESSURE: 74 MMHG | BODY MASS INDEX: 28.29 KG/M2 | SYSTOLIC BLOOD PRESSURE: 130 MMHG | WEIGHT: 170 LBS

## 2023-07-28 DIAGNOSIS — Z3A.37 37 WEEKS GESTATION OF PREGNANCY: ICD-10-CM

## 2023-07-28 DIAGNOSIS — Z34.03 ENCOUNTER FOR SUPERVISION OF NORMAL FIRST PREGNANCY, THIRD TRIMESTER: Primary | ICD-10-CM

## 2023-07-28 PROCEDURE — PNV: Performed by: OBSTETRICS & GYNECOLOGY

## 2023-07-28 NOTE — PROGRESS NOTES
This is a 28 y.o. Lona Boas at 37w4d who presents for return OB visit. No complaints. Denies contractions, leakage, bleeding. Endorses fetal movement   BP: 130/74 TWlb    GBS negative  NST performed as auscultating FHR was concerning for decelerations vs large accelerations. NST reactive - baseline 130 with accelerations to the 170s, moderate variability, no decelerations.  Contractions noted on toco not apparent to the patient   Reviewed s/sx labor  F/up 1 wk

## 2023-07-31 ENCOUNTER — ROUTINE PRENATAL (OUTPATIENT)
Dept: OBGYN CLINIC | Facility: CLINIC | Age: 28
End: 2023-07-31

## 2023-07-31 VITALS — BODY MASS INDEX: 28.29 KG/M2 | WEIGHT: 170 LBS | DIASTOLIC BLOOD PRESSURE: 82 MMHG | SYSTOLIC BLOOD PRESSURE: 110 MMHG

## 2023-07-31 DIAGNOSIS — L23.7 POISON IVY: ICD-10-CM

## 2023-07-31 DIAGNOSIS — Z3A.38 38 WEEKS GESTATION OF PREGNANCY: ICD-10-CM

## 2023-07-31 DIAGNOSIS — Z34.03 ENCOUNTER FOR SUPERVISION OF NORMAL FIRST PREGNANCY, THIRD TRIMESTER: Primary | ICD-10-CM

## 2023-07-31 PROCEDURE — PNV: Performed by: OBSTETRICS & GYNECOLOGY

## 2023-07-31 RX ORDER — METHYLPREDNISOLONE 4 MG/1
TABLET ORAL
Qty: 1 EACH | Refills: 0 | Status: SHIPPED | OUTPATIENT
Start: 2023-07-31 | End: 2023-08-09

## 2023-07-31 NOTE — PROGRESS NOTES
28 y.o.   female at 45 wga for PNV. BP : 110/82. TW  Was in the garden yesterday and woke up with poison ivy.   She often gets this and usu needs oral steroids  - will start hydrocortisone cream, with calamine lotion, and antihistamine  - rx medrol dose pack sent to use if topicals do not help  Reviewed labor precautions and FK  Prefers to await spontaneous labor until [facility]  F/u 1 week Provider responded to 12/27 MyChart encounter.

## 2023-07-31 NOTE — PATIENT INSTRUCTIONS
Hydrocortisone cream  Calamine lotion  Non sedating antihistamine - Zyrtec, Claritin    Benadryl - sedating

## 2023-08-09 ENCOUNTER — ROUTINE PRENATAL (OUTPATIENT)
Dept: OBGYN CLINIC | Facility: CLINIC | Age: 28
End: 2023-08-09

## 2023-08-09 VITALS — SYSTOLIC BLOOD PRESSURE: 124 MMHG | DIASTOLIC BLOOD PRESSURE: 82 MMHG | BODY MASS INDEX: 28.46 KG/M2 | WEIGHT: 171 LBS

## 2023-08-09 DIAGNOSIS — Z3A.39 39 WEEKS GESTATION OF PREGNANCY: ICD-10-CM

## 2023-08-09 DIAGNOSIS — Z34.03 ENCOUNTER FOR SUPERVISION OF NORMAL FIRST PREGNANCY, THIRD TRIMESTER: Primary | ICD-10-CM

## 2023-08-09 PROCEDURE — PNV: Performed by: OBSTETRICS & GYNECOLOGY

## 2023-08-09 NOTE — PROGRESS NOTES
28 y.o. Pratibha Walsh female at 39w2d (Estimated Date of Delivery: 23) for PNV. Pre- Vitals    Flowsheet Row Most Recent Value   Prenatal Assessment    Fetal Heart Rate 127   Movement Present   Presentation Vertex   Prenatal Vitals    Blood Pressure 124/82   Weight - Scale 77.6 kg (171 lb)   Urine Albumin/Glucose    Dilation/Effacement/Station    Cervical Dilation 0   Cervical Effacement 50   Fetal Station -2   Vaginal Drainage    Edema         TW.072 kg (20 lb)    Leakage of fluid: no  Vaginal bleeding: no  Contractions/Cramping: no  Fetal movement: yes  Undressed for a cervical check : , soft, mid. Scheduled for IOL  8pm, discussed goncalves/cyto/arom. Post partum visit scheduled.

## 2023-08-09 NOTE — PATIENT INSTRUCTIONS
Induction of Labor is defined as “having labor started (induced) before the labor begins on its  own. “Indicated Induction” - If the labor is started due to a medical condition you or the baby have, it is an indicated induction. This may be done if the continuing of the pregnancy is of more risk than the delivering of the baby. “Elective Induction” - If the labor is started for convenience, it is an elective induction. When can you have an elective induction? Your healthcare provider must be sure of your due date. Your are at least 39 weeks pregnant. When your cervix (the opening to the birth canal) shows signs of being ready for labor (your healthcare provider will discuss this with you). Your healthcare provider must confirm you have not had a previous caesarean section or major surgery on your uterus. Risks of elective induction:  Baby's lungs may not be mature and baby may need to go to the  Intensive Care Unit. Breastfeeding may be delayed. Your baby may have trouble maintaining his/her body temperature. You may have a longer labor. You have an increased risk of having a caesarean section. How is labor induced? There are several methods to induce labor. Your doctor will chose the method that is best for you:  Stripping of membranes: The doctor examines your cervix and uses the tip of a finger to separate the amniotic membrane from the wall of your uterus.  this membrane causes your body to release hormones call prostaglandins. Prostaglandins help the cervix become soft and thin so it is prepared for labor. Breaking your water: This can help to bring to labor. The doctor uses a plastic hook to make a small opening in the bag of water. Cervical ripening agents: These are synthetic agents similar to what your body makes to ripen your cervix or start your labor.   Prostaglandins which are used to thin and soften the cervix to prepare it for labor include: How is labor induced… cont'd…  Cervidil - this is a vaginal insert which must be kept in the vagina for up to 12 hours. Cytotec - small tablets placed in the vagina near the cervix. Prostaglandin gel - this is a gel which is applied with a syringe and small catheter to the area around the cervix. The hormone Oxytocin (Pitocin) - this medication is given through an IV and is regulated with the assistance of an IV pump. The medication is started in a small dose and increased slowly with until labor is progressing. All of the above require close monitoring of both you and your baby. You will have a fetal monitor for your baby,  and your vital signs will be taken often. Fetal monitoring intervals as well as your vital signs will be based on the method and the doctor orders. Not all inductions result in labor, and if after hours or days of trying, it is possible a woman may end up having a  section. Medically Indicated Inductions:  Medically indicated inductions may be done because: Your water broke but labor has not started. You are at least one week past your due date. You have an infection in your uterus. You have a pregnancy complicated by risk factors. Examples include:  Gestational diabetes. High blood pressure. There is a problem with your placenta or with the growth of your baby. Not reassuring fetal heart rate pattern. Decrease in the fluid around your baby. Augmentation of Labor:  If your labor is not progressing (cervical changes dilating and thinning) at a normal rate, your doctor may decide to augment (help a long) your labor by breaking your water or using the IV medication Oxytocin (Pitocin). Risks of Labor Inductions:  Stripping of membranes - infection, bleeding, accidental rupture of membranes. Amniotomy  (rupture of membranes by the doctor) infection, prolapse of the umbilical cord, changes in the baby's position.   Cervical ripening agent - uterine contractions that are too strong or too long and may cause the baby to be stressed and may result in a caesarean section. Pitocin IV Medication - contractions that are too strong, too frequent or too long resulting in the baby being stressed and may result in a caesarean section, uterine rupture, or water intoxication (a disturbance in your electrolytes).

## 2023-08-11 ENCOUNTER — PROCEDURE VISIT (OUTPATIENT)
Dept: NEUROLOGY | Facility: CLINIC | Age: 28
End: 2023-08-11

## 2023-08-11 VITALS — DIASTOLIC BLOOD PRESSURE: 68 MMHG | TEMPERATURE: 98.2 F | HEART RATE: 67 BPM | SYSTOLIC BLOOD PRESSURE: 133 MMHG

## 2023-08-11 DIAGNOSIS — G43.709 CHRONIC MIGRAINE WITHOUT AURA WITHOUT STATUS MIGRAINOSUS, NOT INTRACTABLE: Primary | ICD-10-CM

## 2023-08-11 NOTE — PROGRESS NOTES
Universal Protocol   Consent: Verbal consent obtained. Written consent obtained. Risks and benefits: risks, benefits and alternatives were discussed  Consent given by: patient  Time out: Immediately prior to procedure a "time out" was called to verify the correct patient, procedure, equipment, support staff and site/side marked as required. Patient understanding: patient states understanding of the procedure being performed  Patient consent: the patient's understanding of the procedure matches consent given  Procedure consent: procedure consent matches procedure scheduled  Relevant documents: relevant documents present and verified  Patient identity confirmed: verbally with patient        Chemodenervation     Date/Time 8/11/2023 1:15 PM     Performed by  Lashanda Hernandez PA-C   Authorized by Lashanda Hernandez PA-C       Pre-procedure details      Prepped With: Alcohol     Anesthesia  (see MAR for exact dosages):      Anesthesia method:  None   Procedure details     Position:  Upright   Botox     Botox Type:  Type A    Brand:  Botox    mL's of Botulinum Toxin:  200    Final Concentration per CC:  50 units    Needle Gauge:  30 G 2.5 inch   Procedures     Botox Procedures: chronic headache      Indications: migraines     Injection Location      Head / Face:  L superior cervical paraspinal, R superior cervical paraspinal, L , R , L frontalis, R frontalis, L medial occipitalis, R medial occipitalis, procerus, R temporalis, L temporalis, R superior trapezius and L superior trapezius    L  injection amount:  5 unit(s)    R  injection amount:  5 unit(s)    L lateral frontalis:  5 unit(s)    R lateral frontalis:  5 unit(s)    L medial frontalis:  5 unit(s)    R medial frontalis:  5 unit(s)    L temporalis injection amount:  20 unit(s)    R temporalis injection amount:  20 unit(s)    Procerus injection amount:  5 unit(s)    L medial occipitalis injection amount:  15 unit(s)    R medial occipitalis injection amount:  15 unit(s)    L superior cervical paraspinal injection amount:  10 unit(s)    R superior cervical paraspinal injection amount:  10 unit(s)    L superior trapezius injection amount:  15 unit(s)    R superior trapezius injection amount:  15 unit(s)   Total Units     Total units used:  200    Total units discarded:  0   Post-procedure details      Chemodenervation:  Chronic migraine    Facial Nerve Location[de-identified]  Bilateral facial nerve    Patient tolerance of procedure:   Tolerated well, no immediate complications   Comments      45 units frontalis  All medically necessary

## 2023-08-12 ENCOUNTER — ANESTHESIA EVENT (EMERGENCY)
Dept: ANESTHESIOLOGY | Facility: HOSPITAL | Age: 28
End: 2023-08-12

## 2023-08-12 ENCOUNTER — HOSPITAL ENCOUNTER (INPATIENT)
Facility: HOSPITAL | Age: 28
LOS: 2 days | Discharge: HOME | End: 2023-08-14
Attending: OBSTETRICS & GYNECOLOGY | Admitting: OBSTETRICS & GYNECOLOGY
Payer: COMMERCIAL

## 2023-08-12 ENCOUNTER — NURSE TRIAGE (OUTPATIENT)
Dept: OTHER | Facility: OTHER | Age: 28
End: 2023-08-12

## 2023-08-12 ENCOUNTER — ANESTHESIA (EMERGENCY)
Dept: ANESTHESIOLOGY | Facility: HOSPITAL | Age: 28
End: 2023-08-12

## 2023-08-12 LAB
ABO GROUP BLD: NORMAL
BASE EXCESS BLDCOV CALC-SCNC: -4.5 MMOL/L (ref 1–9)
BASOPHILS # BLD AUTO: 0.03 THOUSANDS/ÂΜL (ref 0–0.1)
BASOPHILS NFR BLD AUTO: 0 % (ref 0–1)
BLD GP AB SCN SERPL QL: NEGATIVE
EOSINOPHIL # BLD AUTO: 0.27 THOUSAND/ÂΜL (ref 0–0.61)
EOSINOPHIL NFR BLD AUTO: 2 % (ref 0–6)
ERYTHROCYTE [DISTWIDTH] IN BLOOD BY AUTOMATED COUNT: 13.8 % (ref 11.6–15.1)
HCO3 BLDCOV-SCNC: 21.4 MMOL/L (ref 12.2–28.6)
HCT VFR BLD AUTO: 40.3 % (ref 34.8–46.1)
HGB BLD-MCNC: 13.8 G/DL (ref 11.5–15.4)
HOLD SPECIMEN: NORMAL
IMM GRANULOCYTES # BLD AUTO: 0.07 THOUSAND/UL (ref 0–0.2)
IMM GRANULOCYTES NFR BLD AUTO: 1 % (ref 0–2)
LYMPHOCYTES # BLD AUTO: 1.65 THOUSANDS/ÂΜL (ref 0.6–4.47)
LYMPHOCYTES NFR BLD AUTO: 14 % (ref 14–44)
MCH RBC QN AUTO: 30.9 PG (ref 26.8–34.3)
MCHC RBC AUTO-ENTMCNC: 34.2 G/DL (ref 31.4–37.4)
MCV RBC AUTO: 90 FL (ref 82–98)
MONOCYTES # BLD AUTO: 0.93 THOUSAND/ÂΜL (ref 0.17–1.22)
MONOCYTES NFR BLD AUTO: 8 % (ref 4–12)
NEUTROPHILS # BLD AUTO: 8.55 THOUSANDS/ÂΜL (ref 1.85–7.62)
NEUTS SEG NFR BLD AUTO: 75 % (ref 43–75)
NRBC BLD AUTO-RTO: 0 /100 WBCS
OXYHGB MFR BLDCOV: 45.3 %
PCO2 BLDCOV: 42.4 MM HG (ref 27–43)
PH BLDCOV: 7.32 [PH] (ref 7.19–7.49)
PLATELET # BLD AUTO: 164 THOUSANDS/UL (ref 149–390)
PMV BLD AUTO: 9.2 FL (ref 8.9–12.7)
PO2 BLDCOV: 20.8 MM HG (ref 15–45)
RBC # BLD AUTO: 4.46 MILLION/UL (ref 3.81–5.12)
RH BLD: POSITIVE
SAO2 % BLDCOV: 9.6 ML/DL
SPECIMEN EXPIRATION DATE: NORMAL
TREPONEMA PALLIDUM IGG+IGM AB [PRESENCE] IN SERUM OR PLASMA BY IMMUNOASSAY: NORMAL
WBC # BLD AUTO: 11.5 THOUSAND/UL (ref 4.31–10.16)

## 2023-08-12 PROCEDURE — 86850 RBC ANTIBODY SCREEN: CPT | Performed by: OBSTETRICS & GYNECOLOGY

## 2023-08-12 PROCEDURE — NC001 PR NO CHARGE: Performed by: OBSTETRICS & GYNECOLOGY

## 2023-08-12 PROCEDURE — 86780 TREPONEMA PALLIDUM: CPT | Performed by: OBSTETRICS & GYNECOLOGY

## 2023-08-12 PROCEDURE — 59400 OBSTETRICAL CARE: CPT | Performed by: OBSTETRICS & GYNECOLOGY

## 2023-08-12 PROCEDURE — 82805 BLOOD GASES W/O2 SATURATION: CPT | Performed by: OBSTETRICS & GYNECOLOGY

## 2023-08-12 PROCEDURE — 86901 BLOOD TYPING SEROLOGIC RH(D): CPT | Performed by: OBSTETRICS & GYNECOLOGY

## 2023-08-12 PROCEDURE — 0UQGXZZ REPAIR VAGINA, EXTERNAL APPROACH: ICD-10-PCS | Performed by: OBSTETRICS & GYNECOLOGY

## 2023-08-12 PROCEDURE — 85025 COMPLETE CBC W/AUTO DIFF WBC: CPT | Performed by: OBSTETRICS & GYNECOLOGY

## 2023-08-12 PROCEDURE — 99214 OFFICE O/P EST MOD 30 MIN: CPT

## 2023-08-12 PROCEDURE — 10H073Z INSERTION OF MONITORING ELECTRODE INTO PRODUCTS OF CONCEPTION, VIA NATURAL OR ARTIFICIAL OPENING: ICD-10-PCS | Performed by: OBSTETRICS & GYNECOLOGY

## 2023-08-12 PROCEDURE — 10907ZC DRAINAGE OF AMNIOTIC FLUID, THERAPEUTIC FROM PRODUCTS OF CONCEPTION, VIA NATURAL OR ARTIFICIAL OPENING: ICD-10-PCS | Performed by: OBSTETRICS & GYNECOLOGY

## 2023-08-12 PROCEDURE — 86900 BLOOD TYPING SEROLOGIC ABO: CPT | Performed by: OBSTETRICS & GYNECOLOGY

## 2023-08-12 PROCEDURE — 4A1H7CZ MONITORING OF PRODUCTS OF CONCEPTION, CARDIAC RATE, VIA NATURAL OR ARTIFICIAL OPENING: ICD-10-PCS | Performed by: OBSTETRICS & GYNECOLOGY

## 2023-08-12 RX ORDER — LIDOCAINE HCL/EPINEPHRINE/PF 2%-1:200K
VIAL (ML) INJECTION AS NEEDED
Status: DISCONTINUED | OUTPATIENT
Start: 2023-08-12 | End: 2023-08-12

## 2023-08-12 RX ORDER — DOCUSATE SODIUM 100 MG/1
100 CAPSULE, LIQUID FILLED ORAL 2 TIMES DAILY PRN
Status: DISCONTINUED | OUTPATIENT
Start: 2023-08-12 | End: 2023-08-14 | Stop reason: HOSPADM

## 2023-08-12 RX ORDER — LIDOCAINE HYDROCHLORIDE 10 MG/ML
INJECTION, SOLUTION EPIDURAL; INFILTRATION; INTRACAUDAL; PERINEURAL AS NEEDED
Status: DISCONTINUED | OUTPATIENT
Start: 2023-08-12 | End: 2023-08-21 | Stop reason: HOSPADM

## 2023-08-12 RX ORDER — CLONIDINE 100 UG/ML
INJECTION, SOLUTION EPIDURAL AS NEEDED
Status: DISCONTINUED | OUTPATIENT
Start: 2023-08-12 | End: 2023-08-21 | Stop reason: HOSPADM

## 2023-08-12 RX ORDER — TERBUTALINE SULFATE 1 MG/ML
INJECTION, SOLUTION SUBCUTANEOUS
Status: DISPENSED
Start: 2023-08-12 | End: 2023-08-12

## 2023-08-12 RX ORDER — SODIUM CHLORIDE, SODIUM LACTATE, POTASSIUM CHLORIDE, CALCIUM CHLORIDE 600; 310; 30; 20 MG/100ML; MG/100ML; MG/100ML; MG/100ML
125 INJECTION, SOLUTION INTRAVENOUS CONTINUOUS
Status: DISCONTINUED | OUTPATIENT
Start: 2023-08-12 | End: 2023-08-12

## 2023-08-12 RX ORDER — OXYTOCIN/RINGER'S LACTATE 30/500 ML
PLASTIC BAG, INJECTION (ML) INTRAVENOUS
Status: COMPLETED
Start: 2023-08-12 | End: 2023-08-12

## 2023-08-12 RX ORDER — BUPIVACAINE HYDROCHLORIDE 2.5 MG/ML
30 INJECTION, SOLUTION EPIDURAL; INFILTRATION; INTRACAUDAL ONCE AS NEEDED
Status: DISCONTINUED | OUTPATIENT
Start: 2023-08-12 | End: 2023-08-12

## 2023-08-12 RX ORDER — LIDOCAINE HYDROCHLORIDE AND EPINEPHRINE 15; 5 MG/ML; UG/ML
INJECTION, SOLUTION EPIDURAL AS NEEDED
Status: DISCONTINUED | OUTPATIENT
Start: 2023-08-12 | End: 2023-08-21 | Stop reason: HOSPADM

## 2023-08-12 RX ORDER — ONDANSETRON 2 MG/ML
4 INJECTION INTRAMUSCULAR; INTRAVENOUS EVERY 6 HOURS PRN
Status: DISCONTINUED | OUTPATIENT
Start: 2023-08-12 | End: 2023-08-12

## 2023-08-12 RX ORDER — OXYTOCIN/RINGER'S LACTATE 30/500 ML
1-30 PLASTIC BAG, INJECTION (ML) INTRAVENOUS
Status: DISCONTINUED | OUTPATIENT
Start: 2023-08-12 | End: 2023-08-12

## 2023-08-12 RX ORDER — IBUPROFEN 600 MG/1
600 TABLET ORAL EVERY 6 HOURS PRN
Status: DISCONTINUED | OUTPATIENT
Start: 2023-08-12 | End: 2023-08-14 | Stop reason: HOSPADM

## 2023-08-12 RX ORDER — DIAPER,BRIEF,INFANT-TODD,DISP
1 EACH MISCELLANEOUS DAILY PRN
Status: DISCONTINUED | OUTPATIENT
Start: 2023-08-12 | End: 2023-08-14 | Stop reason: HOSPADM

## 2023-08-12 RX ORDER — CALCIUM CARBONATE 500 MG/1
1000 TABLET, CHEWABLE ORAL 2 TIMES DAILY PRN
Status: DISCONTINUED | OUTPATIENT
Start: 2023-08-12 | End: 2023-08-12

## 2023-08-12 RX ORDER — LIDOCAINE HCL/EPINEPHRINE/PF 2%-1:200K
VIAL (ML) INJECTION AS NEEDED
Status: DISCONTINUED | OUTPATIENT
Start: 2023-08-12 | End: 2023-08-21 | Stop reason: HOSPADM

## 2023-08-12 RX ORDER — ACETAMINOPHEN 325 MG/1
650 TABLET ORAL EVERY 4 HOURS PRN
Status: DISCONTINUED | OUTPATIENT
Start: 2023-08-12 | End: 2023-08-14 | Stop reason: HOSPADM

## 2023-08-12 RX ORDER — DIPHENHYDRAMINE HCL 25 MG
25 TABLET ORAL EVERY 6 HOURS PRN
Status: DISCONTINUED | OUTPATIENT
Start: 2023-08-12 | End: 2023-08-14 | Stop reason: HOSPADM

## 2023-08-12 RX ORDER — FENTANYL CITRATE 50 UG/ML
INJECTION, SOLUTION INTRAMUSCULAR; INTRAVENOUS AS NEEDED
Status: DISCONTINUED | OUTPATIENT
Start: 2023-08-12 | End: 2023-08-21 | Stop reason: HOSPADM

## 2023-08-12 RX ADMIN — SODIUM CHLORIDE, SODIUM LACTATE, POTASSIUM CHLORIDE, AND CALCIUM CHLORIDE 125 ML/HR: .6; .31; .03; .02 INJECTION, SOLUTION INTRAVENOUS at 03:23

## 2023-08-12 RX ADMIN — SODIUM CHLORIDE, SODIUM LACTATE, POTASSIUM CHLORIDE, AND CALCIUM CHLORIDE 125 ML/HR: .6; .31; .03; .02 INJECTION, SOLUTION INTRAVENOUS at 09:52

## 2023-08-12 RX ADMIN — ROPIVACAINE HYDROCHLORIDE: 2 INJECTION, SOLUTION EPIDURAL; INFILTRATION at 19:15

## 2023-08-12 RX ADMIN — ROPIVACAINE HYDROCHLORIDE: 2 INJECTION, SOLUTION EPIDURAL; INFILTRATION at 09:21

## 2023-08-12 RX ADMIN — CLONIDINE HYDROCHLORIDE 100 MCG: 0.1 INJECTION, SOLUTION EPIDURAL at 14:25

## 2023-08-12 RX ADMIN — LIDOCAINE HYDROCHLORIDE 10 ML: 10 INJECTION, SOLUTION EPIDURAL; INFILTRATION; INTRACAUDAL; PERINEURAL at 14:25

## 2023-08-12 RX ADMIN — ROPIVACAINE HYDROCHLORIDE: 2 INJECTION, SOLUTION EPIDURAL; INFILTRATION at 14:03

## 2023-08-12 RX ADMIN — CALCIUM CARBONATE (ANTACID) CHEW TAB 500 MG 1000 MG: 500 CHEW TAB at 03:41

## 2023-08-12 RX ADMIN — IBUPROFEN 600 MG: 600 TABLET ORAL at 23:47

## 2023-08-12 RX ADMIN — ROPIVACAINE HYDROCHLORIDE: 2 INJECTION, SOLUTION EPIDURAL; INFILTRATION at 02:42

## 2023-08-12 RX ADMIN — LIDOCAINE HYDROCHLORIDE,EPINEPHRINE BITARTRATE 5 ML: 20; .005 INJECTION, SOLUTION EPIDURAL; INFILTRATION; INTRACAUDAL; PERINEURAL at 16:18

## 2023-08-12 RX ADMIN — SODIUM CHLORIDE, SODIUM LACTATE, POTASSIUM CHLORIDE, AND CALCIUM CHLORIDE 125 ML/HR: .6; .31; .03; .02 INJECTION, SOLUTION INTRAVENOUS at 02:21

## 2023-08-12 RX ADMIN — SODIUM CHLORIDE, SODIUM LACTATE, POTASSIUM CHLORIDE, AND CALCIUM CHLORIDE 999 ML/HR: .6; .31; .03; .02 INJECTION, SOLUTION INTRAVENOUS at 01:56

## 2023-08-12 RX ADMIN — FENTANYL CITRATE 100 MCG: 50 INJECTION INTRAMUSCULAR; INTRAVENOUS at 16:18

## 2023-08-12 RX ADMIN — Medication 2 MILLI-UNITS/MIN: at 08:22

## 2023-08-12 RX ADMIN — LIDOCAINE HYDROCHLORIDE AND EPINEPHRINE 5 ML: 15; 5 INJECTION, SOLUTION EPIDURAL at 02:38

## 2023-08-12 RX ADMIN — CALCIUM CARBONATE (ANTACID) CHEW TAB 500 MG 1000 MG: 500 CHEW TAB at 10:02

## 2023-08-12 NOTE — TELEPHONE ENCOUNTER
. 39W5D. Contraction Q5 mins for last hour. Scant spotting. . Called while en route to [facility]. States she is a few mins away. FYI     Reason for Disposition  • [1] First baby (primipara) AND [2] contractions > 5 minutes apart, or for < 2 hours    Answer Assessment - Initial Assessment Questions  1. ONSET: "When did the symptoms begin?"         >1 hour  2. CONTRACTIONS: "Describe the contractions that you are having." (e.g., duration, frequency, regularity, severity)    Q5 for last hour  3. GIANNI: "What date are you expecting to deliver?"        4. PARITY: "Have you had a baby before?" If Yes, ask: "How long did the labor last?"        5. FETAL MOVEMENT: "Has the baby's movement decreased or changed significantly from normal?"    baseline  6.  OTHER SYMPTOMS: "Do you have any other symptoms?" (e.g., leaking fluid from vagina, vaginal bleeding, fever, hand/facial swelling)      spotting    Protocols used: PREGNANCY - LABOR-ADULT-

## 2023-08-12 NOTE — OB LABOR/OXYTOCIN SAFETY PROGRESS
Labor progress- Jessayuni Brohard 28 y.o. female MRN: [id number]    Unit/Bed#: -01 Encounter: [id number]       Contraction Frequency (minutes): 1-4  Contraction Quality: Moderate  Tachysystole: No   Cervical Dilation: 3        Cervical Effacement: 50  Fetal Station: -2  Baseline Rate: 130 bpm  Fetal Heart Rate: 131 BPM  FHR Category: Category I               Vital Signs:   Vitals:    08/12/23 0433   BP: 118/73   Pulse: 97   Resp:    Temp:    SpO2:        Notes/comments:   Pt resting comfortably on exam, has epidural in place. SVE unchanged from prior, FHR category I. Continue expectant management, consider starting pitocin at next check if unchanged.         Zander Xie MD 8/12/2023 4:52 AM

## 2023-08-12 NOTE — OB LABOR/OXYTOCIN SAFETY PROGRESS
Oxytocin Safety Progress Check Note - Elizabet Iniguez 28 y.o. female MRN: [id number]    Unit/Bed#: -01 Encounter: [id number]    Dose (juan manuel-units/min) Oxytocin: 4 juan manuel-units/min  Contraction Frequency (minutes): 2-3  Contraction Quality: Moderate  Tachysystole: No   Cervical Dilation: Lip/rim (Comment)        Cervical Effacement: 100  Fetal Station: -1  Baseline Rate: 120 bpm  Fetal Heart Rate: 116 BPM  FHR Category: Category I  Oxytocin Safety Progress Check: Safety check completed            Vital Signs:   Vitals:    08/12/23 1623   BP: 117/73   Pulse: 82   Resp:    Temp:    SpO2:        Notes/comments:   Comfortable.   Will continue pitocin     Mary Canales MD 8/12/2023 4:56 PM

## 2023-08-12 NOTE — H&P
H&P Exam - Obstetrics   Lita Villagomez 28 y.o. female MRN: [id number]  Unit/Bed#: LD PACU-01 Encounter: [id number]    >2 Midnights    INPATIENT     History of Present Illness   Chief Complaint: contractions    HPI:  Lita Villagomez is a 28 y.o.  female with an GIANNI of 2023, by Last Menstrual Period at 39w5d weeks gestation who is being admitted for Active labor. Her current obstetrical history has been uncomplicated. Contractions: Frequency: Every 2-4 minutes. Leakage of fluid: None. Bleeding: None. Fetal movement: present. Pregnancy complications: none. Review of Systems    Historical Information   OB History    Para Term  AB Living   1 0 0 0 0 0   SAB IAB Ectopic Multiple Live Births   0 0 0 0 0      # Outcome Date GA Lbr Wilson/2nd Weight Sex Delivery Anes PTL Lv   1 Current              Baby complications/comments: none  Past Medical History:   Diagnosis Date   • Migraine      Past Surgical History:   Procedure Laterality Date   • ANKLE SURGERY     • WISDOM TOOTH EXTRACTION       Social History   Social History     Substance and Sexual Activity   Alcohol Use Not Currently    Comment: socially--none since confirmed pregnancy     Social History     Substance and Sexual Activity   Drug Use No     Social History     Tobacco Use   Smoking Status Never   Smokeless Tobacco Never     Family History: non-contributory    Meds/Allergies   {  Facility-Administered Medications Prior to Admission   Medication   • [COMPLETED] Botulinum Toxin Type A SOLR 200 Units     Medications Prior to Admission   Medication   • Prenatal MV-Min-Fe Fum-FA-DHA (PRENATAL+DHA PO)     Allergies   Allergen Reactions   • Lactose Intolerance (Gi) - Food Allergy GI Intolerance       Objective   Vitals: Blood pressure 140/69, pulse 82, temperature 98.8 °F (37.1 °C), temperature source Oral, resp.  rate 18, height 5' 5" (1.651 m), weight 77.6 kg (171 lb), last menstrual period 2022, SpO2 97 %, not currently breastfeeding. Body mass index is 28.46 kg/m². Invasive Devices     None                 Physical Exam  Vaginal Exam  Fluid: n/a  Consistency: soft  Position: mid  Presentation: cephalic,   Dilation: 3,   Effacement: 80,   Station: -1,    Fetal heart rate  moderate  Baseline: 120 bpm    Estimated Fetal Weight: 7    Prenatal Labs: I have personally reviewed pertinent reports.   , Blood Type:   Lab Results   Component Value Date/Time    ABO Grouping O 01/26/2023 01:07 PM     , D (Rh type):   Lab Results   Component Value Date/Time    Rh Factor Positive 01/26/2023 01:07 PM     , Antibody Screen: No results found for: "ANTIBODYSCR" , HCT/HGB:   Lab Results   Component Value Date/Time    Hematocrit 40.0 05/10/2023 08:00 AM    Hematocrit 39.0 09/11/2015 09:35 AM    Hemoglobin 12.8 05/10/2023 08:00 AM    Hemoglobin 12.6 09/11/2015 09:35 AM      , MCV:   Lab Results   Component Value Date/Time    MCV 94 05/10/2023 08:00 AM    MCV 86 09/11/2015 09:35 AM      , Platelets:   Lab Results   Component Value Date/Time    Platelets 481 34/73/6670 08:00 AM    Platelets 853 86/02/6755 09:35 AM      , 1 hour Glucola:   Lab Results   Component Value Date/Time    Glucose 95 05/10/2023 08:00 AM   , 3 hour GTT: No results found for: "IEQRZZJ7SL", Varicella:   Lab Results   Component Value Date/Time    Varicella IgG IMMUNE 12/22/2017 10:40 AM       , Rubella:   Lab Results   Component Value Date/Time    Rubella IgG Quant 38.2 01/26/2023 01:07 PM        , VDRL/RPR:   Lab Results   Component Value Date/Time    RPR Non-Reactive 01/26/2023 01:07 PM      , Urine Culture/Screen:   Lab Results   Component Value Date/Time    Urine Culture No Growth <1000 cfu/mL 01/26/2023 02:20 PM       , Urine Drug Screen: No results found for: "AMPHETUR", "BARBTUR", "BDZUR", "THCUR", "COCAINEUR", "Irene Estelle", "OPIATEUR", "PCPUR", "MTHAMUR", "ECSTASYUR", "TRICYCLICSUR", Hep B:   Lab Results   Component Value Date/Time    Hepatitis B Surface Ag Non-reactive 2023 01:07 PM     , Hep C: No components found for: "HEPCSAG", "EXTHEPCSAG"   , HIV: No results found for: "HIVAGAB"  , Chlamydia: No results found for: "EXTCHLAMYDIA"      Imaging, EKG, Pathology, and Other Studies: I have personally reviewed pertinent reports.       Assessment/Plan     Assessment:  29y  at 39.5wga in labor  Plan:  Admit, IVF, labs, epidural

## 2023-08-12 NOTE — ANESTHESIA PROCEDURE NOTES
Epidural Block    Patient location during procedure: OB  Start time: 8/12/2023 2:37 AM  Reason for block: procedure for pain and at surgeon's request  Staffing  Performed by:  Alisa Guerra CRNA  Authorized by: Rick Shabazz MD    Preanesthetic Checklist  Completed: patient identified, IV checked, site marked, risks and benefits discussed, surgical consent, monitors and equipment checked, pre-op evaluation and timeout performed  Epidural  Patient position: sitting  Prep: ChloraPrep  Patient monitoring: cardiac monitor and frequent blood pressure checks  Approach: midline  Location: lumbar  Injection technique: ROOPA saline  Needle  Needle type: Tuohy   Needle gauge: 18 G  Catheter type: side hole  Catheter size: 20 G  Catheter at skin depth: 12 cm  Catheter securement method: stabilization device  Test dose: negative  Assessment  Number of attempts: 1negative aspiration for CSF, negative aspiration for heme and no paresthesia on injection  patient tolerated the procedure well with no immediate complications

## 2023-08-12 NOTE — PLAN OF CARE
Problem: Knowledge Deficit  Goal: Verbalizes understanding of labor plan  Description: Assess patient/family/caregiver's baseline knowledge level and ability to understand information. Provide education via patient/family/caregiver's preferred learning method at appropriate level of understanding. 1. Provide teaching at level of understanding. 2. Provide teaching via preferred learning method(s). Outcome: Progressing  Goal: Patient/family/caregiver demonstrates understanding of disease process, treatment plan, medications, and discharge instructions  Description: Complete learning assessment and assess knowledge base. Interventions:  - Provide teaching at level of understanding  - Provide teaching via preferred learning methods  Outcome: Progressing     Problem: Labor & Delivery  Goal: Manages discomfort  Description: Assess and monitor for signs and symptoms of discomfort. Assess patient's pain level regularly and per hospital policy. Administer medications as ordered. Support use of nonpharmacological methods to help control pain such as distraction, imagery, relaxation, and application of heat and cold. Collaborate with interdisciplinary team and patient to determine appropriate pain management plan. 1. Include patient in decisions related to comfort. 2. Offer non-pharmacological pain management interventions. 3. Report ineffective pain management to physician. Outcome: Progressing  Goal: Patient vital signs are stable  Description: 1. Assess vital signs - vaginal delivery.   Outcome: Progressing     Problem: PAIN - ADULT  Goal: Verbalizes/displays adequate comfort level or baseline comfort level  Description: Interventions:  - Encourage patient to monitor pain and request assistance  - Assess pain using appropriate pain scale  - Administer analgesics based on type and severity of pain and evaluate response  - Implement non-pharmacological measures as appropriate and evaluate response  - Consider cultural and social influences on pain and pain management  - Notify physician/advanced practitioner if interventions unsuccessful or patient reports new pain  Outcome: Progressing     Problem: INFECTION - ADULT  Goal: Absence or prevention of progression during hospitalization  Description: INTERVENTIONS:  - Assess and monitor for signs and symptoms of infection  - Monitor lab/diagnostic results  - Monitor all insertion sites, i.e. indwelling lines, tubes, and drains  - Monitor endotracheal if appropriate and nasal secretions for changes in amount and color  - [place] appropriate cooling/warming therapies per order  - Administer medications as ordered  - Instruct and encourage patient and family to use good hand hygiene technique  - Identify and instruct in appropriate isolation precautions for identified infection/condition  Outcome: Progressing  Goal: Absence of fever/infection during neutropenic period  Description: INTERVENTIONS:  - Monitor WBC    Outcome: Progressing     Problem: SAFETY ADULT  Goal: Patient will remain free of falls  Description: INTERVENTIONS:  - Educate patient/family on patient safety including physical limitations  - Instruct patient to call for assistance with activity   - Consult OT/PT to assist with strengthening/mobility   - Keep Call bell within reach  - Keep bed low and locked with side rails adjusted as appropriate  - Keep care items and personal belongings within reach  - Initiate and maintain comfort rounds  - Make Fall Risk Sign visible to staff  - Outcome: Progressing  Goal: Maintain or return to baseline ADL function  Description: INTERVENTIONS:  -  Assess patient's ability to carry out ADLs; assess patient's baseline for ADL function and identify physical deficits which impact ability to perform ADLs (bathing, care of mouth/teeth, toileting, grooming, dressing, etc.)  - Assess/evaluate cause of self-care deficits   - Assess range of motion  - Assess patient's mobility; develop plan if impaired  - Assess patient's need for assistive devices and provide as appropriate  - Encourage maximum independence but intervene and supervise when necessary  - Involve family in performance of ADLs  - Assess for home care needs following discharge   - Consider OT consult to assist with ADL evaluation and planning for discharge  - Provide patient education as appropriate  Outcome: Progressing  Goal: Maintains/Returns to pre admission functional level  Description: INTERVENTIONS:  - Perform BMAT or MOVE assessment daily.   - Set and communicate daily mobility goal to care team and patient/family/caregiver.    - Collaborate with rehabilitation services on mobility goals if consulted  - Outcome: Progressing     Problem: DISCHARGE PLANNING  Goal: Discharge to home or other facility with appropriate resources  Description: INTERVENTIONS:  - Identify barriers to discharge w/patient and caregiver  - Arrange for needed discharge resources and transportation as appropriate  - Identify discharge learning needs (meds, wound care, etc.)  - Arrange for interpretive services to assist at discharge as needed  - Refer to Case Management Department for coordinating discharge planning if the patient needs post-hospital services based on physician/advanced practitioner order or complex needs related to functional status, cognitive ability, or social support system  Outcome: Progressing

## 2023-08-12 NOTE — OB LABOR/OXYTOCIN SAFETY PROGRESS
Oxytocin Safety Progress Check Note - Milind Shabazz 28 y.o. female MRN: [id number]    Unit/Bed#: -01 Encounter: [id number]    Dose (juan manuel-units/min) Oxytocin: 4 juan manuel-units/min  Contraction Frequency (minutes): 1-4  Contraction Quality: Moderate  Tachysystole: No   Cervical Dilation: 8        Cervical Effacement: 90  Fetal Station: 0  Baseline Rate: 135 bpm  Fetal Heart Rate: 135 BPM  FHR Category: Category I               Vital Signs:   Vitals:    08/12/23 1523   BP: 123/73   Pulse: 78   Resp:    Temp:    SpO2:        Notes/comments:   Patient is resting comfortably in bed on her back. She just received extra medication in her epidural and is very comfortable. SVE 8/90/0. FHT category 1. Dr. Gina Segura is aware.      Yvan Hodges MD 8/12/2023 3:52 PM

## 2023-08-12 NOTE — OB LABOR/OXYTOCIN SAFETY PROGRESS
Oxytocin Safety Progress Check Note - Laxmi Vansant 28 y.o. female MRN: [id number]    Unit/Bed#: -01 Encounter: [id number]    Dose (juan manuel-units/min) Oxytocin: 4 juan manuel-units/min  Contraction Frequency (minutes): 2-4  Contraction Quality: Moderate  Tachysystole: No   Cervical Dilation: 4        Cervical Effacement: 80  Fetal Station: -1  Baseline Rate: 125 bpm  Fetal Heart Rate: 131 BPM  FHR Category: Category I               Vital Signs:   Vitals:    08/12/23 0908   BP: 98/51   Pulse: 65   Resp:    Temp:    SpO2:        Notes/comments:   Patient resting comfortably in bed on her left side. SVE 4/80/-1. Pitocin started at 0822. FHT category I. Dr. Lazo  aware.       Nusrat Parra MD 8/12/2023 9:36 AM

## 2023-08-12 NOTE — ANESTHESIA PREPROCEDURE EVALUATION
Procedure:  LABOR ANALGESIA    Relevant Problems   CARDIO   (+) Migraine, unspecified, not intractable, without status migrainosus      GYN   (+) 39 weeks gestation of pregnancy   (+) Encounter for supervision of normal first pregnancy, third trimester      NEURO/PSYCH   (+) Migraine, unspecified, not intractable, without status migrainosus        Physical Exam    Airway    Mallampati score: I  TM Distance: >3 FB  Neck ROM: full     Dental   No notable dental hx     Cardiovascular      Pulmonary      Other Findings        Anesthesia Plan  ASA Score- 2     Anesthesia Type- epidural with ASA Monitors. Additional Monitors:   Airway Plan:     Comment: Patient examined, history reviewed. Labor epidural explained, risks and benefits discussed. Consent has been signed. .       Plan Factors-Exercise tolerance (METS): >4 METS. Chart reviewed. Existing labs reviewed. Patient summary reviewed. Induction-     Postoperative Plan-     Informed Consent- Anesthetic plan and risks discussed with patient. I personally reviewed this patient with the CRNA. Discussed and agreed on the Anesthesia Plan with the CRNA. Bk Benjamin

## 2023-08-12 NOTE — TELEPHONE ENCOUNTER
. 39W5D. Contraction Q5 mins for last hour. Scant spotting. . Called while en route to [facility]. States she is a few mins away. FYI     On call provider contacted and informed of patient’s concerns and status. L&D charge nurse notified.

## 2023-08-12 NOTE — OB LABOR/OXYTOCIN SAFETY PROGRESS
Oxytocin Safety Progress Check Note - Lettyjerome Cooper 28 y.o. female MRN: [id number]    Unit/Bed#: -01 Encounter: [id number]    Dose (juan manuel-units/min) Oxytocin: 0 juan manuel-units/min  Contraction Frequency (minutes): 2-4  Contraction Quality: Moderate  Tachysystole: No   Cervical Dilation: 4        Cervical Effacement: 80  Fetal Station: -1  Baseline Rate: 120 bpm  Fetal Heart Rate: 131 BPM  FHR Category: Category II               Vital Signs:   Vitals:    08/12/23 0938   BP: 133/62   Pulse: 78   Resp:    Temp:    SpO2:        Notes/comments:   Prolonged deceleration for 5 minutes. SVE unchanged. FSE applied. Patient repositioned, given oxygen, and increased IV fluids with return to baseline. Deceleration occurred in the setting of 6 back-to-back contractions. Terbutaline brought in the room but not given. Dr. Mcdonald [place] aware.      Dexter Sterling MD 8/12/2023 9:52 AM

## 2023-08-12 NOTE — OB LABOR/OXYTOCIN SAFETY PROGRESS
Oxytocin Safety Progress Check Note - Elizabet Iniguez 28 y.o. female MRN: [id number]    Unit/Bed#: -01 Encounter: [id number]    Dose (juan manuel-units/min) Oxytocin: 2 juan manuel-units/min  Contraction Frequency (minutes): 2-3  Contraction Quality: Moderate  Tachysystole: No   Cervical Dilation: 5-6        Cervical Effacement: 80  Fetal Station: -1  Baseline Rate: 130 bpm  Fetal Heart Rate: 131 BPM  FHR Category: Category I               Vital Signs:   Vitals:    08/12/23 1208   BP: 133/67   Pulse: 81   Resp:    Temp:    SpO2:        Notes/comments:     Pt remains comfortable with epidural. Cat I tracing. SVE making progress. Continue titrating pitocin.  D/w Dr Solange Lynn MD 8/12/2023 12:57 PM

## 2023-08-13 PROBLEM — O13.9 GESTATIONAL HYPERTENSION: Status: ACTIVE | Noted: 2023-08-13

## 2023-08-13 LAB
ALBUMIN SERPL BCP-MCNC: 2.9 G/DL (ref 3.5–5)
ALP SERPL-CCNC: 90 U/L (ref 34–104)
ALT SERPL W P-5'-P-CCNC: 19 U/L (ref 7–52)
ANION GAP SERPL CALCULATED.3IONS-SCNC: 6 MMOL/L
AST SERPL W P-5'-P-CCNC: 34 U/L (ref 13–39)
BASOPHILS # BLD AUTO: 0.06 THOUSANDS/ÂΜL (ref 0–0.1)
BASOPHILS NFR BLD AUTO: 0 % (ref 0–1)
BILIRUB SERPL-MCNC: 1.3 MG/DL (ref 0.2–1)
BUN SERPL-MCNC: 9 MG/DL (ref 5–25)
CALCIUM ALBUM COR SERPL-MCNC: 9.4 MG/DL (ref 8.3–10.1)
CALCIUM SERPL-MCNC: 8.5 MG/DL (ref 8.4–10.2)
CHLORIDE SERPL-SCNC: 106 MMOL/L (ref 96–108)
CO2 SERPL-SCNC: 24 MMOL/L (ref 21–32)
CREAT SERPL-MCNC: 0.71 MG/DL (ref 0.6–1.3)
EOSINOPHIL # BLD AUTO: 0.09 THOUSAND/ÂΜL (ref 0–0.61)
EOSINOPHIL NFR BLD AUTO: 1 % (ref 0–6)
ERYTHROCYTE [DISTWIDTH] IN BLOOD BY AUTOMATED COUNT: 14.2 % (ref 11.6–15.1)
GFR SERPL CREATININE-BSD FRML MDRD: 116 ML/MIN/1.73SQ M
GLUCOSE SERPL-MCNC: 91 MG/DL (ref 65–140)
HCT VFR BLD AUTO: 34.1 % (ref 34.8–46.1)
HGB BLD-MCNC: 11.7 G/DL (ref 11.5–15.4)
IMM GRANULOCYTES # BLD AUTO: 0.12 THOUSAND/UL (ref 0–0.2)
IMM GRANULOCYTES NFR BLD AUTO: 1 % (ref 0–2)
LYMPHOCYTES # BLD AUTO: 1.33 THOUSANDS/ÂΜL (ref 0.6–4.47)
LYMPHOCYTES NFR BLD AUTO: 8 % (ref 14–44)
MCH RBC QN AUTO: 31.6 PG (ref 26.8–34.3)
MCHC RBC AUTO-ENTMCNC: 34.3 G/DL (ref 31.4–37.4)
MCV RBC AUTO: 92 FL (ref 82–98)
MONOCYTES # BLD AUTO: 1.27 THOUSAND/ÂΜL (ref 0.17–1.22)
MONOCYTES NFR BLD AUTO: 7 % (ref 4–12)
NEUTROPHILS # BLD AUTO: 14.19 THOUSANDS/ÂΜL (ref 1.85–7.62)
NEUTS SEG NFR BLD AUTO: 83 % (ref 43–75)
NRBC BLD AUTO-RTO: 0 /100 WBCS
PLATELET # BLD AUTO: 136 THOUSANDS/UL (ref 149–390)
PLATELET BLD QL SMEAR: ADEQUATE
PMV BLD AUTO: 9.2 FL (ref 8.9–12.7)
POTASSIUM SERPL-SCNC: 3.7 MMOL/L (ref 3.5–5.3)
PROT SERPL-MCNC: 4.8 G/DL (ref 6.4–8.4)
RBC # BLD AUTO: 3.7 MILLION/UL (ref 3.81–5.12)
RBC MORPH BLD: NORMAL
SODIUM SERPL-SCNC: 136 MMOL/L (ref 135–147)
WBC # BLD AUTO: 17.06 THOUSAND/UL (ref 4.31–10.16)

## 2023-08-13 PROCEDURE — 99024 POSTOP FOLLOW-UP VISIT: CPT | Performed by: OBSTETRICS & GYNECOLOGY

## 2023-08-13 PROCEDURE — 80053 COMPREHEN METABOLIC PANEL: CPT

## 2023-08-13 PROCEDURE — 85025 COMPLETE CBC W/AUTO DIFF WBC: CPT

## 2023-08-13 RX ADMIN — ACETAMINOPHEN 650 MG: 325 TABLET, FILM COATED ORAL at 16:10

## 2023-08-13 RX ADMIN — DOCUSATE SODIUM 100 MG: 100 CAPSULE, LIQUID FILLED ORAL at 17:31

## 2023-08-13 RX ADMIN — HYDROCORTISONE 1 APPLICATION: 1 CREAM TOPICAL at 00:07

## 2023-08-13 RX ADMIN — ACETAMINOPHEN 650 MG: 325 TABLET, FILM COATED ORAL at 00:28

## 2023-08-13 RX ADMIN — IBUPROFEN 600 MG: 600 TABLET ORAL at 05:56

## 2023-08-13 RX ADMIN — IBUPROFEN 600 MG: 600 TABLET ORAL at 19:59

## 2023-08-13 RX ADMIN — ACETAMINOPHEN 650 MG: 325 TABLET, FILM COATED ORAL at 08:47

## 2023-08-13 RX ADMIN — BENZOCAINE AND LEVOMENTHOL 1 APPLICATION: 200; 5 SPRAY TOPICAL at 00:07

## 2023-08-13 RX ADMIN — DOCUSATE SODIUM 100 MG: 100 CAPSULE, LIQUID FILLED ORAL at 08:47

## 2023-08-13 RX ADMIN — WITCH HAZEL 1 PAD: 500 SOLUTION RECTAL; TOPICAL at 00:07

## 2023-08-13 RX ADMIN — IBUPROFEN 600 MG: 600 TABLET ORAL at 12:27

## 2023-08-13 NOTE — PROGRESS NOTES
Progress Note - OB/GYN  Naye Lawson 28 y.o. female MRN: [id number]  Unit/Bed#: -01 Encounter: [id number]    Assessment and Plan     Naye Lawson is a patient of: [facility]. She is PPD# 1 s/p  spontaneous vaginal delivery  Recovering well and is stable        (spontaneous vaginal delivery)  Assessment & Plan  Lochia WNL   Recovering well   Appropriate bowel and bladder function   Pain well controlled   Tolerating diet   Breastfeeding  Ambulating without issues   No lower extremity tenderness  GBS neg  Rh pos   Contraception: undecided, wants to follow up with obgyn outpatient       * Gestational hypertension  Assessment & Plan  Met criteria intrapartum   CBC, CMP pending   Asymptomatic, no severe range pressures requiring treatment      Disposition    - Anticipate discharge home on PPD# 2      Subjective/Objective     Chief Complaint: Postpartum State     Subjective:    Naye Lawson is PPD#1 s/p  spontaneous vaginal delivery. She has no current complaints. Pain is well controlled. Patient is currently voiding. She is ambulating. Patient is currently passing flatus and has had no bowel movement. She is tolerating PO, and denies nausea or vomitting. Patient denies fever, chills, chest pain, shortness of breath, or calf tenderness. Lochia is normal. She is  Breastfeeding. She is recovering well and is stable.        Vitals:   /55   Pulse 69   Temp 98.7 °F (37.1 °C) (Oral)   Resp 18   Ht 5' 5" (1.651 m)   Wt 77.6 kg (171 lb)   LMP 2022   SpO2 98%   Breastfeeding Yes   BMI 28.46 kg/m²       Intake/Output Summary (Last 24 hours) at 2023 0737  Last data filed at 2023 0201  Gross per 24 hour   Intake 385.5 ml   Output 2541 ml   Net -2155.5 ml       Invasive Devices     Peripheral Intravenous Line  Duration           Peripheral IV 23 Left;Ventral (anterior) Forearm 1 day                Physical Exam:   GEN: Naye Lawson appears well, alert and oriented x 3, pleasant and cooperative   CARDIO: RRR, no murmurs or rubs  RESP:  CTAB, no wheezes or rales  ABDOMEN: soft, no tenderness, no distention, fundus @ U  EXTREMITIES: SCDs on, non tender, no erythema, b/l Maximino's sign negative      Labs:     Hemoglobin   Date Value Ref Range Status   08/13/2023 11.7 11.5 - 15.4 g/dL Final   08/12/2023 13.8 11.5 - 15.4 g/dL Final   09/11/2015 12.6 11.5 - 15.4 g/dL Final   12/22/2014 12.4 11.5 - 15.4 g/dL Final     WBC   Date Value Ref Range Status   08/13/2023 17.06 (H) 4.31 - 10.16 Thousand/uL Final   08/12/2023 11.50 (H) 4.31 - 10.16 Thousand/uL Final   09/11/2015 8.40 4.31 - 10.16 Thousand/uL Final   12/22/2014 6.13 4.31 - 10.16 Thousand/uL Final     Platelets   Date Value Ref Range Status   08/13/2023 136 (L) 149 - 390 Thousands/uL Final   08/12/2023 164 149 - 390 Thousands/uL Final   09/11/2015 254 149 - 390 Thousand/uL Final   12/22/2014 333 149 - 390 Thousand/uL Final     Creatinine   Date Value Ref Range Status   08/13/2023 0.71 0.60 - 1.30 mg/dL Final     Comment:     Standardized to IDMS reference method   11/21/2018 0.57 (L) 0.60 - 1.30 mg/dL Final     Comment:     Standardized to IDMS reference method   09/11/2015 0.66 0.60 - 1.30 mg/dL Final     Comment:     Standardized to IDMS reference method   12/22/2014 0.74 0.60 - 1.30 mg/dL Final     Comment:     Standardized to IDMS reference method     AST   Date Value Ref Range Status   08/13/2023 34 13 - 39 U/L Final   09/11/2015 14 5 - 45 U/L Final   12/22/2014 17 0 - 45 U/L Final     ALT   Date Value Ref Range Status   08/13/2023 19 7 - 52 U/L Final     Comment:     Specimen collection should occur prior to Sulfasalazine administration due to the potential for falsely depressed results.     09/11/2015 20 12 - 78 U/L Final   12/22/2014 17 14 - 59 U/L Final          Cesilia Hoang MD  8/13/2023  8:28 AM

## 2023-08-13 NOTE — PLAN OF CARE
Problem: PAIN - ADULT  Goal: Verbalizes/displays adequate comfort level or baseline comfort level  Description: Interventions:  - Encourage patient to monitor pain and request assistance  - Assess pain using appropriate pain scale  - Administer analgesics based on type and severity of pain and evaluate response  - Implement non-pharmacological measures as appropriate and evaluate response  - Consider cultural and social influences on pain and pain management  - Notify physician/advanced practitioner if interventions unsuccessful or patient reports new pain  Outcome: Progressing     Problem: INFECTION - ADULT  Goal: Absence or prevention of progression during hospitalization  Description: INTERVENTIONS:  - Assess and monitor for signs and symptoms of infection  - Monitor lab/diagnostic results  - Monitor all insertion sites, i.e. indwelling lines, tubes, and drains  - Monitor endotracheal if appropriate and nasal secretions for changes in amount and color  - [place] appropriate cooling/warming therapies per order  - Administer medications as ordered  - Instruct and encourage patient and family to use good hand hygiene technique  - Identify and instruct in appropriate isolation precautions for identified infection/condition  Outcome: Progressing  Goal: Absence of fever/infection during neutropenic period  Description: INTERVENTIONS:  - Monitor WBC    Outcome: Progressing     Problem: SAFETY ADULT  Goal: Patient will remain free of falls  Description: INTERVENTIONS:  - Educate patient/family on patient safety including physical limitations  - Instruct patient to call for assistance with activity   - Consult OT/PT to assist with strengthening/mobility   - Keep Call bell within reach  - Keep bed low and locked with side rails adjusted as appropriate  - Keep care items and personal belongings within reach  - Initiate and maintain comfort rounds  - Make Fall Risk Sign visible to staff  - Consider moving patient to room near nurses station  Outcome: Progressing  Goal: Maintain or return to baseline ADL function  Description: INTERVENTIONS:  -  Assess patient's ability to carry out ADLs; assess patient's baseline for ADL function and identify physical deficits which impact ability to perform ADLs (bathing, care of mouth/teeth, toileting, grooming, dressing, etc.)  - Assess/evaluate cause of self-care deficits   - Assess range of motion  - Assess patient's mobility; develop plan if impaired  - Assess patient's need for assistive devices and provide as appropriate  - Encourage maximum independence but intervene and supervise when necessary  - Involve family in performance of ADLs  - Assess for home care needs following discharge   - Consider OT consult to assist with ADL evaluation and planning for discharge  - Provide patient education as appropriate  Outcome: Progressing  Goal: Maintains/Returns to pre admission functional level  Description: INTERVENTIONS:  - Perform BMAT or MOVE assessment daily.   - Set and communicate daily mobility goal to care team and patient/family/caregiver.    - Collaborate with rehabilitation services on mobility goals if consulted  - Out of bed for toileting  - Record patient progress and toleration of activity level   Outcome: Progressing     Problem: DISCHARGE PLANNING  Goal: Discharge to home or other facility with appropriate resources  Description: INTERVENTIONS:  - Identify barriers to discharge w/patient and caregiver  - Arrange for needed discharge resources and transportation as appropriate  - Identify discharge learning needs (meds, wound care, etc.)  - Arrange for interpretive services to assist at discharge as needed  - Refer to Case Management Department for coordinating discharge planning if the patient needs post-hospital services based on physician/advanced practitioner order or complex needs related to functional status, cognitive ability, or social support system  Outcome: Progressing Problem: POSTPARTUM  Goal: Experiences normal postpartum course  Description: INTERVENTIONS:  - Monitor maternal vital signs  - Assess uterine involution and lochia  Outcome: Progressing  Goal: Appropriate maternal -  bonding  Description: INTERVENTIONS:  - Identify family support  - Assess for appropriate maternal/infant bonding   -Encourage maternal/infant bonding opportunities  - Referral to  or  as needed  Outcome: Progressing  Goal: Establishment of infant feeding pattern  Description: INTERVENTIONS:  - Assess breast/bottle feeding  - Refer to lactation as needed  Outcome: Progressing  Goal: Incision(s), wounds(s) or drain site(s) healing without S/S of infection  Description: INTERVENTIONS  - Assess and document dressing, incision, wound bed, drain sites and surrounding tissue  - Provide patient and family education

## 2023-08-13 NOTE — DISCHARGE SUMMARY
Discharge Summary - OB/GYN  Oralia De Paz 28 y.o. female MRN: [id number]  Unit/Bed#: -01 Encounter: [id number]    Admission Date: 2023     Discharge Date: 23    Admitting Attending: Lance De La Garza, *    Delivering Attending: Dr. Elzbieta Cowan     Discharging Attending: Dr. Elzbieta Cowan    Principal Diagnosis:   Pregnancy at 39w5d      Procedures: spontaneous vaginal delivery    Anesthesia: epidural    Hospital course: Ms. Oralia De Paz is a 28 y.o.  at 39.5wks who presented to labor and delivery in labor. She was initially 3/90/-1. She was admitted. She received an epidural.  She was then artificially ruptured for clear fluid. She was unchanged after rupture membranes so was started on Pitocin for augmentation. She made steady cervical change and was found to be complete at 1821 and started pushing at [address]    She delivered a viable male  on 2023 at 2216. Weight 8lbs 2.7oz via normal spontaneous vaginal delivery. She sustained bilateral vaginal lacerations during delivery which was adequately repaired. Apgars were 8 (1 min) and 9 (5 min).  was transferred to  nursery. Patient tolerated the procedure well. Her post-delivery course was complicated by meeting criteria for gestational hypertension. Her blood pressures were well-controlled on day of discharge. Her postpartum pain was well controlled with oral analgesics. On day of discharge, she was ambulating and able to reasonably perform all ADLs. She was voiding and had appropriate bowel function. Pain was well controlled. She was discharged home on postpartum day #2 without complications. Patient was instructed to follow up with her OB as an outpatient and was given appropriate warnings to call provider if she develops signs of infection or uncontrolled pain.     Complications: none apparent    Condition at discharge: good     Discharge instructions/Information to patient and family:   See after visit summary for information provided to patient and family. Provisions for Follow-Up Care:  See after visit summary for information related to follow-up care and any pertinent home health orders. Disposition: See After Visit Summary for discharge disposition information. Planned Readmission: No    Discharge medications and instructions:   Please see AVS for full list of medications upon discharge.       Raven Bourgeois DO PGY-1  08/14/23  6:19 AM

## 2023-08-13 NOTE — L&D DELIVERY NOTE
Vaginal Delivery Summary - OB/GYN   Mariela Schreiber 28 y.o. female MRN: [id number]  Unit/Bed#: -01 Encounter: [id number]          Predelivery Diagnosis:  1. Pregnancy at 39.5 wks  2. labor    Postdelivery Diagnosis:  1. Same as above  2. Delivery of term     Procedure: normal spontaneous vaginal delivery    Attending: Mayank Borges    Resident: Mary Shelley    Anesthesia: Epidural    QBL: 001TU    Complications: none apparent    Specimens: cord blood, arterial and venous cord blood gasses, placenta (to storage), segment of cord    Findings:   1. Viable male at 2216, with APGARS of 8 and 9 at 1 and 5 minutes respectively, Weight not available at time of dictation  2. Placenta spontaneous at 2222  3. Bilateral vaginal laceration repaired with 2-0 vicyrl  4. Arterial Blood gas - pending  5. Venous Blood gas 7.321. BE -4.5    Disposition:  Patient tolerated the procedure well and was recovering in labor and delivery room     Brief history and labor course:  Ms. Mariela Schreiber is a 28 y.o.  at 39.5wks who presented to labor and delivery in labor. She was initially 3/90/-1. She was admitted. She received an epidural.  She was then artificially ruptured for clear fluid. She was unchanged after rupture membranes so was started on Pitocin for augmentation. She made steady cervical change and was found to be complete at 1821 and started pushing at [facility]    Description of procedure    After pushing for 3h and 44 minutes, at 2216 patient delivered a viable male , apgars of 8 (1 min) and 9 (5 min). The fetal vertex delivered spontaneously. There was no evidence for nuchal cord. The anterior should delivered atraumatically with maternal expulsive forces and the assistance of downward traction. The posterior shoulder delivered with maternal expulsive forces and the assistance of upward traction. The remainder of the fetus delivered spontaneously.      Upon delivery, the infant was placed on the mothers abdomen and the cord was clamped and cut. Awaiting nurse resuscitators evaluated the . Arterial and venous cord blood gases and cord blood was collected for analysis. These were promptly sent to the lab. In the immediate post-partum, 30 units of IV pitocin was administered, and the uterus was noted to contract down well with massage and pitocin. The placenta delivered spontaneously at 2222 and was noted to have a centrally inserted 3 vessel cord. The uterus was massaged until firm and the lower uterine segment was cleared of all clot and debris. The vagina, cervix, perineum, and rectum were inspected and there was noted to be a bilateral vaginal lacerations that were repaired with 3-0 Vicryl. At the conclusion of the procedure, all needle, sponge, and instrument counts were noted to be correct. Patient tolerated the procedure well. I was present and participated in all key portions of the case.

## 2023-08-13 NOTE — PLAN OF CARE
Problem: PAIN - ADULT  Goal: Verbalizes/displays adequate comfort level or baseline comfort level  Description: Interventions:  - Encourage patient to monitor pain and request assistance  - Assess pain using appropriate pain scale  - Administer analgesics based on type and severity of pain and evaluate response  - Implement non-pharmacological measures as appropriate and evaluate response  - Consider cultural and social influences on pain and pain management  - Notify physician/advanced practitioner if interventions unsuccessful or patient reports new pain  Outcome: Progressing     Problem: INFECTION - ADULT  Goal: Absence or prevention of progression during hospitalization  Description: INTERVENTIONS:  - Assess and monitor for signs and symptoms of infection  - Monitor lab/diagnostic results  - Monitor all insertion sites, i.e. indwelling lines, tubes, and drains  - Monitor endotracheal if appropriate and nasal secretions for changes in amount and color  - [place] appropriate cooling/warming therapies per order  - Administer medications as ordered  - Instruct and encourage patient and family to use good hand hygiene technique  - Identify and instruct in appropriate isolation precautions for identified infection/condition  Outcome: Progressing  Goal: Absence of fever/infection during neutropenic period  Description: INTERVENTIONS:  - Monitor WBC    Outcome: Progressing     Problem: SAFETY ADULT  Goal: Patient will remain free of falls  Description: INTERVENTIONS:  - Educate patient/family on patient safety including physical limitations  - Instruct patient to call for assistance with activity   - Consult OT/PT to assist with strengthening/mobility   - Keep Call bell within reach  - Keep bed low and locked with side rails adjusted as appropriate  - Keep care items and personal belongings within reach  - Initiate and maintain comfort rounds  Outcome: Progressing  Goal: Maintain or return to baseline ADL function  Description: INTERVENTIONS:  -  Assess patient's ability to carry out ADLs; assess patient's baseline for ADL function and identify physical deficits which impact ability to perform ADLs (bathing, care of mouth/teeth, toileting, grooming, dressing, etc.)  - Assess/evaluate cause of self-care deficits   - Assess range of motion  - Assess patient's mobility; develop plan if impaired  - Assess patient's need for assistive devices and provide as appropriate  - Encourage maximum independence but intervene and supervise when necessary  - Involve family in performance of ADLs  - Assess for home care needs following discharge   - Consider OT consult to assist with ADL evaluation and planning for discharge  - Provide patient education as appropriate  Outcome: Progressing  Goal: Maintains/Returns to pre admission functional level  Description: INTERVENTIONS:  - Perform BMAT or MOVE assessment daily.   - Set and communicate daily mobility goal to care team and patient/family/caregiver.    - Collaborate with rehabilitation services on mobility goals if consultedel   Outcome: Progressing    Problem: DISCHARGE PLANNING  Goal: Discharge to home or other facility with appropriate resources  Description: INTERVENTIONS:  - Identify barriers to discharge w/patient and caregiver  - Arrange for needed discharge resources and transportation as appropriate  - Identify discharge learning needs (meds, wound care, etc.)  - Arrange for interpretive services to assist at discharge as needed  - Refer to Case Management Department for coordinating discharge planning if the patient needs post-hospital services based on physician/advanced practitioner order or complex needs related to functional status, cognitive ability, or social support system  Outcome: Progressing     Problem: POSTPARTUM  Goal: Experiences normal postpartum course  Description: INTERVENTIONS:  - Monitor maternal vital signs  - Assess uterine involution and lochia  Outcome: Progressing  Goal: Appropriate maternal -  bonding  Description: INTERVENTIONS:  - Identify family support  - Assess for appropriate maternal/infant bonding   -Encourage maternal/infant bonding opportunities  - Referral to  or  as needed  Outcome: Progressing  Goal: Establishment of infant feeding pattern  Description: INTERVENTIONS:  - Assess breast/bottle feeding  - Refer to lactation as needed  Outcome: Progressing

## 2023-08-13 NOTE — PLAN OF CARE
Problem: PAIN - ADULT  Goal: Verbalizes/displays adequate comfort level or baseline comfort level  Description: Interventions:  - Encourage patient to monitor pain and request assistance  - Assess pain using appropriate pain scale  - Administer analgesics based on type and severity of pain and evaluate response  - Implement non-pharmacological measures as appropriate and evaluate response  - Consider cultural and social influences on pain and pain management  - Notify physician/advanced practitioner if interventions unsuccessful or patient reports new pain  Outcome: Progressing     Problem: INFECTION - ADULT  Goal: Absence or prevention of progression during hospitalization  Description: INTERVENTIONS:  - Assess and monitor for signs and symptoms of infection  - Monitor lab/diagnostic results  - Monitor all insertion sites, i.e. indwelling lines, tubes, and drains  - Monitor endotracheal if appropriate and nasal secretions for changes in amount and color  - [place] appropriate cooling/warming therapies per order  - Administer medications as ordered  - Instruct and encourage patient and family to use good hand hygiene technique  - Identify and instruct in appropriate isolation precautions for identified infection/condition  Outcome: Progressing  Goal: Absence of fever/infection during neutropenic period  Description: INTERVENTIONS:  - Monitor WBC    Outcome: Progressing     Problem: SAFETY ADULT  Goal: Patient will remain free of falls  Description: INTERVENTIONS:  - Educate patient/family on patient safety including physical limitations  - Instruct patient to call for assistance with activity   - Consult OT/PT to assist with strengthening/mobility   - Keep Call bell within reach  - Keep bed low and locked with side rails adjusted as appropriate  - Keep care items and personal belongings within reach  - Initiate and maintain comfort rounds  - Make Fall Risk Sign visible to staff  - Offer Toileting every  Hours, in advance of need  - Initiate/Maintain alarm  - Obtain necessary fall risk management equipment:   - Apply yellow socks and bracelet for high fall risk patients  - Consider moving patient to room near nurses station  Outcome: Progressing  Goal: Maintain or return to baseline ADL function  Description: INTERVENTIONS:  -  Assess patient's ability to carry out ADLs; assess patient's baseline for ADL function and identify physical deficits which impact ability to perform ADLs (bathing, care of mouth/teeth, toileting, grooming, dressing, etc.)  - Assess/evaluate cause of self-care deficits   - Assess range of motion  - Assess patient's mobility; develop plan if impaired  - Assess patient's need for assistive devices and provide as appropriate  - Encourage maximum independence but intervene and supervise when necessary  - Involve family in performance of ADLs  - Assess for home care needs following discharge   - Consider OT consult to assist with ADL evaluation and planning for discharge  - Provide patient education as appropriate  Outcome: Progressing  Goal: Maintains/Returns to pre admission functional level  Description: INTERVENTIONS:  - Perform BMAT or MOVE assessment daily.   - Set and communicate daily mobility goal to care team and patient/family/caregiver. - Collaborate with rehabilitation services on mobility goals if consulted  - Perform Range of Motion  times a day. - Reposition patient every  hours.   - Dangle patient  times a day  - Stand patient  times a day  - Ambulate patient  times a day  - Out of bed to chair  times a day   - Out of bed for meals  times a day  - Out of bed for toileting  - Record patient progress and toleration of activity level   Outcome: Progressing     Problem: DISCHARGE PLANNING  Goal: Discharge to home or other facility with appropriate resources  Description: INTERVENTIONS:  - Identify barriers to discharge w/patient and caregiver  - Arrange for needed discharge resources and transportation as appropriate  - Identify discharge learning needs (meds, wound care, etc.)  - Arrange for interpretive services to assist at discharge as needed  - Refer to Case Management Department for coordinating discharge planning if the patient needs post-hospital services based on physician/advanced practitioner order or complex needs related to functional status, cognitive ability, or social support system  Outcome: Progressing     Problem: POSTPARTUM  Goal: Experiences normal postpartum course  Description: INTERVENTIONS:  - Monitor maternal vital signs  - Assess uterine involution and lochia  Outcome: Progressing  Goal: Appropriate maternal -  bonding  Description: INTERVENTIONS:  - Identify family support  - Assess for appropriate maternal/infant bonding   -Encourage maternal/infant bonding opportunities  - Referral to  or  as needed  Outcome: Progressing  Goal: Establishment of infant feeding pattern  Description: INTERVENTIONS:  - Assess breast/bottle feeding  - Refer to lactation as needed  Outcome: Progressing  Goal: Incision(s), wounds(s) or drain site(s) healing without S/S of infection  Description: INTERVENTIONS  - Assess and document dressing, incision, wound bed, drain sites and surrounding tissue  - Provide patient and family education  - Perform skin care/dressing changes every   Outcome: Progressing     Problem: Knowledge Deficit  Goal: Verbalizes understanding of labor plan  Description: Assess patient/family/caregiver's baseline knowledge level and ability to understand information. Provide education via patient/family/caregiver's preferred learning method at appropriate level of understanding. 1. Provide teaching at level of understanding. 2. Provide teaching via preferred learning method(s).   Outcome: Completed  Goal: Patient/family/caregiver demonstrates understanding of disease process, treatment plan, medications, and discharge instructions  Description: Complete learning assessment and assess knowledge base. Interventions:  - Provide teaching at level of understanding  - Provide teaching via preferred learning methods  Outcome: Completed     Problem: Labor & Delivery  Goal: Manages discomfort  Description: Assess and monitor for signs and symptoms of discomfort. Assess patient's pain level regularly and per hospital policy. Administer medications as ordered. Support use of nonpharmacological methods to help control pain such as distraction, imagery, relaxation, and application of heat and cold. Collaborate with interdisciplinary team and patient to determine appropriate pain management plan. 1. Include patient in decisions related to comfort. 2. Offer non-pharmacological pain management interventions. 3. Report ineffective pain management to physician. Outcome: Completed  Goal: Patient vital signs are stable  Description: 1. Assess vital signs - vaginal delivery.   Outcome: Completed

## 2023-08-13 NOTE — ASSESSMENT & PLAN NOTE
Lochia WNL   Recovering well   Appropriate bowel and bladder function   Pain well controlled   Tolerating diet   Breastfeeding  Ambulating without issues   No lower extremity tenderness  GBS neg  Rh pos   Contraception: undecided, wants to follow up with obgyn outpatient
Met criteria intrapartum   CBC, CMP WNL  Asymptomatic, no severe range pressures requiring treatment
no

## 2023-08-13 NOTE — ANESTHESIA POSTPROCEDURE EVALUATION
Post-Op Assessment Note    CV Status:  Stable  Pain Score: 0    Pain management: adequate     Mental Status:  Alert and awake   Hydration Status:  Euvolemic   PONV Controlled:  Controlled   Airway Patency:  Patent      Post Op Vitals Reviewed: Yes      Staff: CRNA     Post-op block assessment: catheter intact, site cleaned and no complications      No notable events documented.     /55 (08/13/23 0817)    Temp 98.7 °F (37.1 °C) (08/13/23 0817)    Pulse 69 (08/13/23 0817)   Resp 18 (08/13/23 0817)    SpO2

## 2023-08-14 VITALS
SYSTOLIC BLOOD PRESSURE: 110 MMHG | OXYGEN SATURATION: 97 % | HEART RATE: 67 BPM | TEMPERATURE: 98.3 F | DIASTOLIC BLOOD PRESSURE: 59 MMHG | WEIGHT: 171 LBS | BODY MASS INDEX: 28.49 KG/M2 | RESPIRATION RATE: 17 BRPM | HEIGHT: 65 IN

## 2023-08-14 PROBLEM — O13.9 GESTATIONAL HYPERTENSION: Status: RESOLVED | Noted: 2023-08-13 | Resolved: 2023-08-14

## 2023-08-14 PROCEDURE — 99024 POSTOP FOLLOW-UP VISIT: CPT | Performed by: OBSTETRICS & GYNECOLOGY

## 2023-08-14 RX ORDER — ACETAMINOPHEN 325 MG/1
650 TABLET ORAL EVERY 6 HOURS PRN
Refills: 0
Start: 2023-08-14

## 2023-08-14 RX ORDER — IBUPROFEN 600 MG/1
600 TABLET ORAL EVERY 6 HOURS PRN
Qty: 30 TABLET | Refills: 0
Start: 2023-08-14

## 2023-08-14 RX ADMIN — BENZOCAINE AND LEVOMENTHOL 1 APPLICATION: 200; 5 SPRAY TOPICAL at 09:03

## 2023-08-14 RX ADMIN — IBUPROFEN 600 MG: 600 TABLET ORAL at 06:00

## 2023-08-14 RX ADMIN — DOCUSATE SODIUM 100 MG: 100 CAPSULE, LIQUID FILLED ORAL at 08:49

## 2023-08-14 NOTE — PROGRESS NOTES
Progress Note - OB/GYN   Carmelina Medina 28 y.o. female MRN: [id number]  Unit/Bed#: -01 Encounter: [id number]      Assessment/Plan    Carmelina Medina is a 28 y.o. Tana Nam who is PPD 2 s/p  at 39w5d      (spontaneous vaginal delivery)  Assessment & Plan  Lochia WNL   Recovering well   Appropriate bowel and bladder function   Pain well controlled   Tolerating diet   Breastfeeding  Ambulating without issues   No lower extremity tenderness  GBS neg  Rh pos   Contraception: undecided, wants to follow up with obgyn outpatient       * Gestational hypertension  Assessment & Plan  Met criteria intrapartum   CBC, CMP WNL  Asymptomatic, no severe range pressures requiring treatment         Disposition: Anticipate discharge home postpartum Day #2  Barriers to discharge: none     Subjective/Objective     Subjective: Postpartum state    Pain: no  Tolerating PO: yes  Voiding: yes  Flatus: yes  Ambulating: yes  Breastfeeding: Breastfeeding  Chest pain: no  Shortness of breath: no  Leg pain: no  Lochia: wnl    Objective:     Vitals:  Vitals:    23 1215 23 1600 23 2109 23 0026   BP: 117/58 109/58 119/60 117/56   BP Location:  Left arm Left arm Right arm   Pulse: 83 70 72 69   Resp: 18 18 18 18   Temp: 97.9 °F (36.6 °C) 97.7 °F (36.5 °C) 97.8 °F (36.6 °C) 98 °F (36.7 °C)   TempSrc: Oral Oral Oral Oral   SpO2:   98% 97%   Weight:       Height:           Physical Exam:   GEN: appears well, alert and oriented x 3, pleasant and cooperative   CV: Regular rate  RESP: non labored breathing  ABDOMEN: soft, no tenderness, no distention, Uterine fundus firm and non-tender, -1 cm below the umbilicus  EXTREMITIES: non-tender  NEURO Alert and oriented to person, place, and time.        Lab Results   Component Value Date    WBC 17.06 (H) 2023    HGB 11.7 2023    HCT 34.1 (L) 2023    MCV 92 2023     (L) 2023         Ifeoma Del Cid, DO  Obstetrics & Gynecology  08/14/23

## 2023-08-14 NOTE — PLAN OF CARE
Problem: PAIN - ADULT  Goal: Verbalizes/displays adequate comfort level or baseline comfort level  Description: Interventions:  - Encourage patient to monitor pain and request assistance  - Assess pain using appropriate pain scale  - Administer analgesics based on type and severity of pain and evaluate response  - Implement non-pharmacological measures as appropriate and evaluate response  - Consider cultural and social influences on pain and pain management  - Notify physician/advanced practitioner if interventions unsuccessful or patient reports new pain  Outcome: Progressing     Problem: INFECTION - ADULT  Goal: Absence or prevention of progression during hospitalization  Description: INTERVENTIONS:  - Assess and monitor for signs and symptoms of infection  - Monitor lab/diagnostic results  - Monitor all insertion sites, i.e. indwelling lines, tubes, and drains  - Monitor endotracheal if appropriate and nasal secretions for changes in amount and color  - [place] appropriate cooling/warming therapies per order  - Administer medications as ordered  - Instruct and encourage patient and family to use good hand hygiene technique  - Identify and instruct in appropriate isolation precautions for identified infection/condition  Outcome: Progressing  Goal: Absence of fever/infection during neutropenic period  Description: INTERVENTIONS:  - Monitor WBC    Outcome: Progressing     Problem: SAFETY ADULT  Goal: Patient will remain free of falls  Description: INTERVENTIONS:  - Educate patient/family on patient safety including physical limitations  - Instruct patient to call for assistance with activity   - Consult OT/PT to assist with strengthening/mobility   - Keep Call bell within reach  - Keep bed low and locked with side rails adjusted as appropriate  - Keep care items and personal belongings within reach  - Initiate and maintain comfort rounds  - Make Fall Risk Sign visible to staff  - Apply yellow socks and bracelet for high fall risk patients  - Consider moving patient to room near nurses station  Outcome: Progressing  Goal: Maintain or return to baseline ADL function  Description: INTERVENTIONS:  -  Assess patient's ability to carry out ADLs; assess patient's baseline for ADL function and identify physical deficits which impact ability to perform ADLs (bathing, care of mouth/teeth, toileting, grooming, dressing, etc.)  - Assess/evaluate cause of self-care deficits   - Assess range of motion  - Assess patient's mobility; develop plan if impaired  - Assess patient's need for assistive devices and provide as appropriate  - Encourage maximum independence but intervene and supervise when necessary  - Involve family in performance of ADLs  - Assess for home care needs following discharge   - Consider OT consult to assist with ADL evaluation and planning for discharge  - Provide patient education as appropriate  Outcome: Progressing  Goal: Maintains/Returns to pre admission functional level  Description: INTERVENTIONS:  - Perform BMAT or MOVE assessment daily.   - Set and communicate daily mobility goal to care team and patient/family/caregiver.    - Collaborate with rehabilitation services on mobility goals if consulted  - Out of bed for toileting  - Record patient progress and toleration of activity level   Outcome: Progressing     Problem: DISCHARGE PLANNING  Goal: Discharge to home or other facility with appropriate resources  Description: INTERVENTIONS:  - Identify barriers to discharge w/patient and caregiver  - Arrange for needed discharge resources and transportation as appropriate  - Identify discharge learning needs (meds, wound care, etc.)  - Arrange for interpretive services to assist at discharge as needed  - Refer to Case Management Department for coordinating discharge planning if the patient needs post-hospital services based on physician/advanced practitioner order or complex needs related to functional status, cognitive ability, or social support system  Outcome: Progressing     Problem: POSTPARTUM  Goal: Experiences normal postpartum course  Description: INTERVENTIONS:  - Monitor maternal vital signs  - Assess uterine involution and lochia  Outcome: Progressing  Goal: Appropriate maternal -  bonding  Description: INTERVENTIONS:  - Identify family support  - Assess for appropriate maternal/infant bonding   -Encourage maternal/infant bonding opportunities  - Referral to  or  as needed  Outcome: Progressing  Goal: Establishment of infant feeding pattern  Description: INTERVENTIONS:  - Assess breast/bottle feeding  - Refer to lactation as needed  Outcome: Progressing  Goal: Incision(s), wounds(s) or drain site(s) healing without S/S of infection  Description: INTERVENTIONS  - Assess and document dressing, incision, wound bed, drain sites and surrounding tissue  - Provide patient and family education  Outcome: Progressing

## 2023-08-14 NOTE — LACTATION NOTE
This note was copied from a baby's chart. CONSULT - LACTATION  Baby Boy Drew Paradaolreshma Liz 2 days male MRN: [id number]    [address] NURSERY Room / Bed: (N)/(N) Encounter: [id number]    Maternal Information     MOTHER:  Tracie Liz  Maternal Age: 28 y.o.   OB History: # 1 - Date: 23, Sex: Male, Weight: 3705 g (8 lb 2.7 oz), GA: 39w5d, Delivery: Vaginal, Spontaneous, Apgar1: 8, Apgar5: 9, Living: Living, Birth Comments: None   Previouse breast reduction surgery? No    Lactation history:   Has patient previously breast fed: No   How long had patient previously breast fed:     Previous breast feeding complications:       Past Surgical History:   Procedure Laterality Date   • ANKLE SURGERY     • WISDOM TOOTH EXTRACTION          Birth information:  YOB: 2024   Time of birth: 10:16 PM   Sex: male   Delivery type: Vaginal, Spontaneous   Birth Weight: 3705 g (8 lb 2.7 oz)   Percent of Weight Change: -4%     Gestational Age: 38w8d   [unfilled]    Assessment     Breast and nipple assessment: symmetrical, med. round breasts with dark areolas and everted, round nipples. Beginnings of red cracks at the base of the nipple from 4-7 bilaterallly     Assessment: slight receded chin - no oral assessment    Feeding assessment: slight pain with latch; may be due to positioning. LATCH:  Latch: Grasps breast, tongue down, lips flanged, rhythmic sucking   Audible Swallowing: Spontaneous and intermittent (24 hours old)   Type of Nipple: Everted (After stimulation)   Comfort (Breast/Nipple): Filling, red/small blisters/bruises, mild/moderate discomfort   Hold (Positioning): Partial assist, teach one side, mother does other, staff holds   [facility] Score: 8          Feeding recommendations:  breast feed on demand. Mom requested help with latching. Mom states when baby is latching mom is feeling pain throughout the feeding.  Mom states baby mainly at the tip of the nipple and throughout the shank of the nipple. Demonstration and teach-back of hand expression, positioning at the breast. Mom prefers a cross cradle hold. Deeper latch achieved with chin deep into the breast tissue, U shape hold of the breast and bringing the baby to the breast, not breast to baby. Mom latched baby to the right breast in cross cradle. Active, coordinated sucks. Ed. On snug hold on baby to support the oral motor muscles. Mom denies any pain with positioning techniques    Demonstration and teach back of breast compressions during the feeding, timing of feeds and signs of satiation. Teach back of positioning techniques on the left breast.     Mom has an S1 - demonstration and teach back of when and how to use    Ed. On cluster feeding and 2nd night syndrome. Handouts and RSB/DC reviewed    Baby and me appt provided for reassurance    Mom has begun wet wound care - reviewed education    Enc.t o call lactation to assist with positions prior to d/c      Education on positioning and alignment. Mom is encouraged to:     - Bring baby up to the breast (use of pillows to elevate so baby's torso is against mom's breasts)   - Skin to skin for feedings with top hand exposed to show signs of satiation   - Chin deep into breast tissue (make baby look up to the nipple)   - nose aligned to the nipple   -Wait for wide gape, drag chin on the breast so nipple is aimed at the upper, back palate  - Cheek should be touching breast   - Deep, firm hold of baby with ear, shoulder, hip alignment    Provided demonstration, education and support of deep latch to breast by placing the nipple to the nose, dragging down to chin to achieve a wide latch. Bring baby to the breast, not breast to baby. Move your shoulders down and away from your ears. Look for ear, shoulder, hip alignment.  Baby's upper and lower lip should be flanged on the breast.    Education on creating a snug hold of your infant to the breast by verifying the infant's cheek is touching the breast, your infant's chin is deep into the breast tissue, your infant's arms are "hugging" the breast, and your infant's lips are flanged on the areola. Bring infant to the breast, not your breast to the infant. Latch should feel like a tugging sensation on the nipple. Demonstrated with teach back breast compressions during a feeding to increase milk transfer and stimulate suckling after a breathing/muscle break. - Start feedings on breast that last feeding ended   - allow no more than 3 hours between breast feeding sessions   - time between feedings is counted from the beginning of the first feed to the beginning of the next feeding session    Reviewed early signs of hunger, including tensing of hands and shoulders - no need to wait for open eyes. Crying is a late hunger sign. If baby is crying, soothe baby first and then attempt to latch. Reviewed normal sucking patterns: transition from stimulation to nutritive to release or non-nutritive. The goal is to see and hear lots of swallowing. Reviewed normal nursing pattern: infant could latch on one breast up to 30 minutes or until releases on own. Signs of satiation is open hand with fingers that do not grab your finger. Discussed difference in sensation of non-nutritive v nutritive sucking    To help your nipples heal, in addition to paying close attention to latch and positioning, apply protective ointment after feeding or pumping and cover with an occlusive dressing. [employer] Education on turning on the pump, press the 3 wavy lines to place pump on stimulation mode (high cycle, low vacuum) Set vacuum to comfort with light suction. After 3 min, press 3 wavy lines and change setting to Expression mode (low Cycle, High vacuum) Vacuum setting should not pinch, only tug the nipple. Now pump is set.  Next time mom pumps, will only need to turn on pump and press 3 wavy line button to change cycle three times in a pumping session. Information on hand expression given. Discussed benefits of knowing how to manually express breast including stimulating milk supply, softening nipple for latch and evacuating breast in the event of engorgement. Mom is encouraged to place baby skin to skin for feedings. Skin to skin education provided for baby placement on mother's chest, baby only in diaper, blankets below shoulders on baby's back. Skin to skin is encouraged to continue at home for feedings and between feedings. Worked on positioning infant up at chest level and starting to feed infant with nose arriving at the nipple. Then, using areolar compression to achieve a deep latch that is comfortable and exchanges optimum amounts of milk. - Start feedings on breast that last feeding ended   - allow no more than 3 hours between breast feeding sessions   - time between feedings is counted from the beginning of the first feed to the beginning of the next feeding session    Reviewed early signs of hunger, including tensing of hands and shoulders - no need to wait for open eyes. Crying is a late hunger sign. If baby is crying, soothe baby first and then attempt to latch. Reviewed normal sucking patterns: transition from stimulation to nutritive to release or non-nutritive. The goal is to see and hear lots of swallowing. Reviewed normal nursing pattern: infant could latch on one breast up to 30 minutes or until releases on own. Signs of satiation is open hand with fingers that do not grab your finger. Discussed difference in sensation of non-nutritive v nutritive sucking    Met with mother. Provided mother with Ready, Set, Baby booklet. Discussed Skin to Skin contact an benefits to mom and baby. Talked about the delay of the first bath until baby has adjusted. Spoke about the benefits of rooming in. Feeding on cue and what that means for recognizing infant's hunger.  Avoidance of pacifiers for the first month discussed. Talked about exclusive breastfeeding for the first 6 months. Positioning and latch reviewed as well as showing images of other feeding positions. Discussed the properties of a good latch in any position. Reviewed hand/manual expression. Discussed s/s that baby is getting enough milk and some s/s that breastfeeding dyad may need further help. Gave information on common concerns, what to expect the first few weeks after delivery, preparing for other caregivers, and how partners can help. Resources for support also provided. Encouraged parents to call for assistance, questions, and concerns about breastfeeding. Extension provided. Provided education on growth spurts, when to introduce bottles; paced bottle feeding, and non-nutritive suck at the breast. Provided education on Signs of satiation. Encouraged to call lactation to observe a latch prior to discharge for reassurance. Encouraged to call baby and me with any questions and closely monitor output.         Jaswinder, [address] [facility] 8/14/2023 9:48 AM

## 2023-08-15 ENCOUNTER — TRANSITIONAL CARE MANAGEMENT (OUTPATIENT)
Dept: INTERNAL MEDICINE CLINIC | Facility: CLINIC | Age: 28
End: 2023-08-15

## 2023-08-15 PROCEDURE — TCMXX

## 2023-08-15 NOTE — UTILIZATION REVIEW
NOTIFICATION OF INPATIENT ADMISSION   MATERNITY/DELIVERY AUTHORIZATION REQUEST   SERVICING FACILITY:   [address] [place] [address] - L&D, , NICU  [address] [place] [address]. [address]  Tax ID: [id number]  NPI: [id number]   ATTENDING PROVIDER:  Attending Name and NPI#: Mary Cameron [[id number]]  Address: [address] [place] [address]. [facility], [address] [place] [address]  Phone: [phone]   ADMISSION INFORMATION:  Place of Service: Inpatient [address] [facility]  Place of Service Code: 21  Inpatient Admission Date/Time: 23  1:20 AM  Discharge Date/Time: 2023 12:45 PM  Admitting Diagnosis Code/Description:  Uterine contractions at greater than 20 weeks of gestation [O47.9]  Encounter for full-term uncomplicated delivery [H42]     Mother: Norlene Cranker 1995 Estimated Date of Delivery: 23  Delivering clinician: Leia Maher    OB History        1    Para   1    Term   1       0    AB   0    Living   1       SAB   0    IAB   0    Ectopic   0    Multiple   0    Live Births   1               Sumner Name & MRN:   Information for the patient's :  Key Gomez Eng) [[id number]]      Delivery Information:  Sex: male  Delivered 2023 10:16 PM by Vaginal, Spontaneous; Gestational Age: 38w8d     Measurements:  Weight: 8 lb 2.7 oz (3705 g); Height: 21.5"    APGAR 1 minute 5 minutes 10 minutes   Totals: 8 9      Sumner Birth Information: 28 y.o. female MRN: [id number] Unit/Bed#: -01   Birthweight: No birth weight on file. Gestational Age: <None> Delivery Type:    APGARS Totals:        UTILIZATION REVIEW CONTACT:  Nitish Summers, Utilization   Network Utilization Review Department  Phone: [phone]  Fax [phone]  Email: [email or web address]  Contact for approvals/pending authorizations, clinical reviews, and discharge.      PHYSICIAN ADVISORY SERVICES:  Medical Necessity Denial & Qamx-oe-Bzso Review  Phone: [phone]  Fax: [phone]  Email: [email or web address]. Safia Rae MD  Physician  OB/GYN  Discharge Summary     Addendum  Date of Service:  2023  6:18 AM       Discharge Summary - OB/GYN  Mariela Schreiber 28 y.o. female MRN: [id number]  Unit/Bed#: -01 Encounter: [id number]     Admission Date: 2023      Discharge Date: 23     Admitting Attending: Domi Mccann, *     Delivering Attending: Dr. Mayank Borges      Discharging Attending: Dr. Mayank Borges     Principal Diagnosis:   Pregnancy at 39w5d        Procedures: spontaneous vaginal delivery     Anesthesia: epidural     Hospital course: Ms. Tracie Liz is a 28 y. V. C9N6 FZ 39.5wks who presented to labor and delivery in labor. Víctor Sanches was initially 3//-1. Víctor Sanches was admitted. Víctor Sanches received an epidural.  She was then artificially ruptured for clear fluid.  She was unchanged after rupture membranes so was started on Pitocin for augmentation.  She made steady cervical change and was found to be complete at 1821 and started pushing at 1822     She delivered a viable male  on 2023 at 2216. Weight 8lbs 2.7oz via normal spontaneous vaginal delivery. She sustained bilateral vaginal lacerations during delivery which was adequately repaired. Apgars were 8 (1 min) and 9 (5 min).  was transferred to  nursery. Patient tolerated the procedure well.      Her post-delivery course was complicated by meeting criteria for gestational hypertension. Her blood pressures were well-controlled on day of discharge. Her postpartum pain was well controlled with oral analgesics.     On day of discharge, she was ambulating and able to reasonably perform all ADLs. She was voiding and had appropriate bowel function. Pain was well controlled. She was discharged home on postpartum day #2 without complications.  Patient was instructed to follow up with her OB as an outpatient and was given appropriate warnings to call provider if she develops signs of infection or uncontrolled pain.     Complications: none apparent     Condition at discharge: good      Discharge instructions/Information to patient and family:   See after visit summary for information provided to patient and family.       Provisions for Follow-Up Care:  See after visit summary for information related to follow-up care and any pertinent home health orders.       Disposition: See After Visit Summary for discharge disposition information.     Planned Readmission: No     Discharge medications and instructions:   Please see AVS for full list of medications upon discharge.        Danielle Campos DO PGY-1  08/14/23  6:19 AM                          Revision History

## 2023-08-17 ENCOUNTER — OFFICE VISIT (OUTPATIENT)
Dept: POSTPARTUM | Facility: CLINIC | Age: 28
End: 2023-08-17
Payer: COMMERCIAL

## 2023-08-17 VITALS — DIASTOLIC BLOOD PRESSURE: 72 MMHG | SYSTOLIC BLOOD PRESSURE: 108 MMHG

## 2023-08-17 DIAGNOSIS — O92.79 PAIN AGGRAVATED BY BREAST FEEDING: ICD-10-CM

## 2023-08-17 DIAGNOSIS — R52 PAIN AGGRAVATED BY BREAST FEEDING: ICD-10-CM

## 2023-08-17 PROCEDURE — 99403 PREV MED CNSL INDIV APPRX 45: CPT | Performed by: PEDIATRICS

## 2023-08-17 RX ORDER — DOCUSATE SODIUM 100 MG/1
100 CAPSULE, LIQUID FILLED ORAL 2 TIMES DAILY
COMMUNITY

## 2023-08-17 NOTE — PROGRESS NOTES
INITIAL BREAST FEEDING EVALUATION    Informant/Relationship: Daquan Garcia and Mary Manzo    Discussion of General Lactation Issues: Hung Rodarte is frequently fussy the last few days. The the only time he is calm is when he is nursing. Occasionally just being held will calm him. Daquan Garcia feels that Hung Rodarte is latching well and feedings are more comfortable than they were the first few days. She has observed that he will pull on the nipple or pop off the breast at times. Infant is 11days old today.  History:  Fertility Problem:no  Breast changes:yes - breasts were méndez and tender, areolas were larger and darker, prominent veining  : yes - labor augmented with pitocin, had an epidural  Full term:yes - 44 5/7 weeks   labor:no  First nursing/attempt < 1 hour after birth:yes - baby latched in the delivery room  Skin to skin following delivery:yes - in the delivery room  Breast changes after delivery:yes - breasts are méndez, firmer. Milk is in   [address] [place] [address] in (infant in room with mother with exception of procedures, eg. Circumcision: yes  Blood sugar issues:yes  NICU stay:no  Jaundice:yes  Phototherapy:yes - at home after discharge  Supplement given: (list supplement and method used as well as reason(s):no    Past Medical History:   Diagnosis Date   • Migraine          Current Outpatient Medications:   •  acetaminophen (TYLENOL) 325 mg tablet, Take 2 tablets (650 mg total) by mouth every 6 (six) hours as needed for mild pain, Disp: , Rfl: 0  •  ibuprofen (MOTRIN) 600 mg tablet, Take 1 tablet (600 mg total) by mouth every 6 (six) hours as needed for mild pain, Disp: 30 tablet, Rfl: 0  •  Prenatal MV-Min-Fe Fum-FA-DHA (PRENATAL+DHA PO), Take by mouth, Disp: , Rfl:   No current facility-administered medications for this visit.     Facility-Administered Medications Ordered in Other Visits:   •  cloNIDine (DURACLON) injection, , Epidural, PRN, Torrie Denise MD, 100 mcg at 23 1425  •  fentanyl citrate (PF) 100 MCG/2ML, , Epidural, PRN, Spero Chantel CRNA, 100 mcg at 08/12/23 1618  •  lidocaine (PF) (XYLOCAINE-MPF) 1 % injection, , Intravenous, PRN, Robyn Quinn MD, 10 mL at 08/12/23 1425  •  lidocaine-epinephrine (XYLOCAINE-MPF/EPINEPHRINE) 1.5 %-1:200,000 injection, , Epidural, PRN, Star Hernandez CRNA, 5 mL at 08/12/23 0238  •  lidocaine-epinephrine (XYLOCAINE-MPF/EPINEPHRINE) 2 %-1:200,000 injection, , Epidural, PRN, Spero Cunas, CRNA, 5 mL at 08/12/23 1618    Allergies   Allergen Reactions   • Lactose Intolerance (Gi) - Food Allergy GI Intolerance       Social History     Substance and Sexual Activity   Drug Use No       Social History Never a smoker    Interval Breastfeeding History:  Frequency of breast feeding: Every 2 hours or more frequently most of the time. Has slept up to 3 hours at times  Does mother feel breastfeeding is effective: Yes, but feedings are long (up to an hour)  Does infant appear satisfied after nursing:No, Cleopatra Holstein continues to give feeding cues after feeding  Stooling pattern normal: Stooling has decreased in frequency in the last 24 hours  Urinating frequently:Yes  Using shield or shells: No    Alternative/Artificial Feedings:   Bottle: No  Cup: No  Syringe/Finger: No           Formula Type: none                     Amount: n/a            Breast Milk:                      Amount: none            Frequency Q 2 Hr between feedings  Elimination Problems: Yes      Equipment:  Nipple Shield             Type: none             Size: n/a             Frequency of Use: n/a  Pump            Type: Spectra S1            Frequency of Use: none  Shells            Type: none            Frequency of use: n/a    Equipment Problems: no    Mom:  Breast: Medium sized slightly asymmetrical breasts  L>R  Rounded shape. Closely spaced. Firm and full  Nipple Assessment in General: Normal: elongated/eraser, no discoloration and no damage noted.   Mother's Awareness of Feeding Cues Recognizes: Jeanette Huizar admits that she is unsure at times                  Verbalizes: Jeanette Huizar admits that she is unsure at times. Support System: FOB, extended family  History of Breastfeeding: none  Changes/Stressors/Violence: Jeanette Huizar is concerned that feedings are still painful at times and that Marc Landers does not appear content after feeding  Concerns/Goals: Jeanette Huizar desires that Marc Landers gets everything he needs. Problems with Mom: attachment related pain. Physical Exam  Constitutional:       Appearance: Normal appearance. HENT:      Head: Normocephalic and atraumatic. Cardiovascular:      Rate and Rhythm: Normal rate and regular rhythm. Pulses: Normal pulses. Heart sounds: Normal heart sounds. Pulmonary:      Effort: Pulmonary effort is normal.      Breath sounds: Normal breath sounds. Musculoskeletal:         General: Normal range of motion. Cervical back: Normal range of motion and neck supple. Neurological:      Mental Status: She is alert and oriented to person, place, and time. Skin:     General: Skin is warm and dry. Psychiatric:         Mood and Affect: Mood normal.         Behavior: Behavior normal.         Thought Content: Thought content normal.         Judgment: Judgment normal.         Infant:  Behaviors: Alert  Color: Pink  Birth weight: 3705gram  Current weight: 3570gram    Problems with infant: tugs on the nipple while feeding, feeds for long periods and does not appear content after feeding      General Appearance:  Alert, active, no distress                             Head:  Normocephalic, large anterior fontanelle, sutures                              Eyes:  Conjunctiva clear, no drainage                              Ears:  Normally placed, no anomolies                             Nose:  no drainage or erythema                           Mouth:  No lesions. High narrow palate.   Tongue extends just barely to the lower lip, lateralizes and elevates fairly well but there is distortion of the tip of the tongue with movement. Moderate cupping of my finger noted as he sucked but very little effective peristalsis. Just compression. The tongue retracted, exposing the lower alveolar ridge frequently. The lingual frenulum is thin, attached just posterior to the tip of the tongue and on the lower alveolar ridge. Neck:  Supple, symmetrical, trachea midline                 Respiratory:  No grunting, flaring, retractions, breath sounds clear and equal            Cardiovascular:  Regular rate and rhythm. No murmur. Adequate perfusion/capillary refill. Femoral pulse present                    Abdomen:   Soft, non-tender, no masses, bowel sounds present, no HSM             Genitourinary:  Normal male, testes descended, no discharge, swelling, or pain, anus patent                          Spine:   No abnormalities noted        Musculoskeletal:  Full range of motion          Skin/Hair/Nails:   Skin warm, dry, and intact, no rashes, jaundice to nipple line                Neurologic:   No abnormal movement, tone appropriate for gestational age     Latch:  Efficiency:               Lips Flanged: Yes              Depth of latch: deep              Audible Swallow: Yes, but no sustained SSB. Peggi Cranker asleep quickly              Visible Milk: Yes              Wide Open/ Asymmetrical: Yes              Suck Swallow Cycle: Breathing: unlabored, Coordinated: yes  Nipple Assessment after latch: Flat   Latch Problems: Kevin Perez latched fairly deeply and his gape was wide but Sofia Doyle felt "pinching" as he sucked. No sustained effective suckling was seen and Kevin Perez was quickly and repeatedly asleep at the breast.    Position:  Infant's Ergonomics/Body               Body Alignment: Yes               Head Supported: Yes               Close to Mom's body/ Lifted/ Supported: Yes               Mom's Ergonomics/Body: Yes                           Supported:  Yes Sitting Back: Yes                           Brings Baby to her breast: Yes  Positioning Problems: None. Shira Spine effectively positioned Marky Elias in [facility] with a pillow for support. Handouts:   Paced bottle feeding, Hands on pumping, Hand expression, Latch Check List and Tongue Tie    Education:  Reviewed Latch: Demonstrated how to gently compress the breast and align the baby so that his nose is just above the nipple with his lower lip and chin touching the breast to encourage the deepest, widest, off-center latch. Discussed why Marky Elias is having trouble latching and feeding effectively at the breast  Reviewed Positioning for Dyad: Demonstrated cradle hold to give Shira Spine options for feeding  Reviewed Frequency/Supply & Demand: Discussed how milk production is established and maintained. Reviewed Infant:Cues and varied States of Awareness  Reviewed Alternative/Artificial Feedings: Discussed and demonstrated paced bottle feeding. Reviewed Equipment: Discussed and demonstrated the use and features of the Spectra S1 and how to determine what size flange to use. Plan:   Reassurance and support given. I acknowledged Tracie's pain and her commitment to breastfeeding. We worked on positioning to improve Tracie's confidence with positioning and support the most effective latch possible. I discussed with Shira Cedillo that Marky Elias has restricted tongue movement which is negatively impacting her comfort and his ability to transfer milk at the breast.  I suggested that she continue to directly breastfeed but reassured her that it would be OK to express milk and feed it to Marky Elias if at any time she needs a break from painful or lengthy feedings. She was taught effective use of her pump. An appointment was scheduled with Dr Leanne Mills for more evaluation and support. I encouraged her to call with any questions or concerns.     I have spent 90 minutes with Patient and family today in which greater than 50% of this time was spent in counseling/coordination of care regarding Instructions for management, Patient and family education and Counseling / Coordination of care.

## 2023-08-17 NOTE — PATIENT INSTRUCTIONS
When positioning Ana Ramirez for breastfeeding, gently compress the breast and align the baby so that his nose is just above the nipple with his lower lip and chin touching the breast to encourage the deepest, widest, off-center latch. The following video will help you determine if Ana Ramirez is latching and feeding effectively at the breast:  [address] Video    [email or web address]   Pump as needed for comfort or if a feeding at the breast is missed. When pumping, cycle your pump through stimulation and expression mode several times in a session to stimulate several let downs until you have expressed enough milk to feed the baby and to achieve breast comfort. There is no need to "empty" the breast completely. Use gentle hands on pumping and hand expression   Maintain your pump as recommended. Use flange that fits comfortably and allows the breast to empty effectively. Follow up with Dr Sara Olivas as scheduled. Please call with any questions or concerns.

## 2023-08-19 LAB — PLACENTA IN STORAGE: NORMAL

## 2023-08-19 NOTE — PROGRESS NOTES
I have reviewed the notes, assessments, and/or procedures performed by Delia Aguilar, RN, IBCLC, I concur with her/his documentation of Addison Hernandez MD 08/19/23

## 2023-08-23 ENCOUNTER — OFFICE VISIT (OUTPATIENT)
Dept: POSTPARTUM | Facility: CLINIC | Age: 28
End: 2023-08-23
Payer: COMMERCIAL

## 2023-08-23 VITALS — SYSTOLIC BLOOD PRESSURE: 116 MMHG | DIASTOLIC BLOOD PRESSURE: 78 MMHG

## 2023-08-23 PROCEDURE — 99215 OFFICE O/P EST HI 40 MIN: CPT | Performed by: PEDIATRICS

## 2023-08-23 NOTE — PROGRESS NOTES
BREAST FEEDING FOLLOW UP VISIT    Informant/Relationship: Shaggy Henry and Ganesh/mom and dad    Discussion of General Lactation Issues: Shaggy Henry has started pumping and [facility] gets bottles overnight. This has helped. [facility] still takes a long time to nurse and doesn't always look satisfied after nursing. Shaggy Henry says that attachment is typically painful, but sometimes the pain decreases. She does not have any consistent pain between feedings. She has not noted any nipple damage. Infant is 6days old today. Interval Breastfeeding History:    Frequency of breast feeding: during the day, about every 3 hours, sometimes he cues more frequently  Does mother feel breastfeeding is effective: She knows he is getting milk, but feels like he is not getting enough. Does infant appear satisfied after nursing:Yes, but it doesn't always last; typically about 2 hours, but can spend up to an hour at the breast only actively for 15-20 minutes  Stooling pattern normal:Yes  Urinating frequently:Yes  Using shield or shells:No    Alternative/Artificial Feedings:   Bottle: Yes, paced feeding  Cup: No  Syringe/Finger: No           Formula Type: n/a                     Amount: n/a            Breast Milk:                      Amount: 3 oz            Frequency Q 3 Hr between feedings; last night slept about 4 hours  Elimination Problems: No      Equipment:  Nipple Shield             Type: n/a             Size: n/a             Frequency of Use: n/a  Pump            Type: Spectra S1            Frequency of Use: one to two times per day; at night in place of feedings and occasionally during the day when offering a bottle; has collected 3-5 oz/pumping session  Shells            Type: n/a            Frequency of use: n/a    Equipment Problems: no      Mom:  Breast: Closely spaced, rounded shape, Full and firm  Nipple Assessment in General: Normal: elongated/eraser, no discoloration and no damage noted.   Mother's Awareness of Feeding Cues Recognizes: Yes                  Verbalizes: Yes  Support System: FOB, both grandparents live close by  History of Breastfeeding: None  Changes/Stressors/Violence: concerns about nursing and milk transfer, nipple pain, length of nursing sessions  Concerns/Goals: Eric Gee wishes to breastfeed Arturo Ferguson more effectively and keep him satisfied longer and know that he is getting enough. She wishes to have him get enough from the breast and be able to feed him directly. Problems with Mom: Nipple pain associated with nursing. Physical Exam  Constitutional:       Appearance: Normal appearance. She is well-developed and normal weight. HENT:      Head: Normocephalic and atraumatic. Eyes:      Extraocular Movements: Extraocular movements intact. Neck:      Thyroid: No thyromegaly. Cardiovascular:      Rate and Rhythm: Normal rate and regular rhythm. Heart sounds: Normal heart sounds. No murmur heard. Pulmonary:      Effort: Pulmonary effort is normal.      Breath sounds: Normal breath sounds. Musculoskeletal:         General: No swelling or tenderness. Normal range of motion. Cervical back: Normal range of motion and neck supple. Right lower leg: No edema. Left lower leg: No edema. Lymphadenopathy:      Cervical: No cervical adenopathy. Upper Body:      Right upper body: No pectoral adenopathy. Left upper body: No pectoral adenopathy. Neurological:      General: No focal deficit present. Mental Status: She is alert and oriented to person, place, and time. Psychiatric:         Mood and Affect: Mood normal.         Behavior: Behavior normal.         Thought Content: Thought content normal.         Judgment: Judgment normal.   Vitals and nursing note reviewed.          Infant:  Behaviors: Alert  Color: Healthy  Birth weight: 3.705 kg  Current weight: 3.827 kg    Problems with infant: Tongue tie      General Appearance:  Alert, active, no distress Head:  Normocephalic, AFOF, sutures opposed                             Eyes:  Conjunctiva clear, no drainage                              Ears:  Normally placed, no anomolies                             Nose:  Septum intact, no drainage or erythema                           Mouth:  No lesions; tongue extend so the lower lip, lateralizes well, and lifts about 30%; there is some dimpling in the tip of the tongue with movement; some cupping of the examiner's finger but no peristalsis just up and down movement of the tongue in conjunction with the jaw movement; tongue also pulled back leading to biting with the lower alveolar ridge; frenulum is thin, short, and has almost no elasticity; frenulum is attached near the tip of the tongue to the top of the inferior alveolar ridge                    Neck:  Supple, symmetrical, trachea midline, no adenopathy; thyroid: no enlargement, symmetric, no tenderness/mass/nodules                 Respiratory:  No grunting, flaring, retractions, breath sounds clear and equal            Cardiovascular:  Regular rate and rhythm. No murmur. Adequate perfusion/capillary refill.  Femoral pulse present                    Abdomen:   Soft, non-tender, no masses, bowel sounds present, no HSM             Genitourinary:  Normal male, testes descended, no discharge, swelling, or pain, anus patent                          Spine:   No abnormalities noted        Musculoskeletal:  Full range of motion          Skin/Hair/Nails:   Skin warm, dry, and intact, no rashes or abnormal dyspigmentation or lesions                Neurologic:   No abnormal movement, tone appropriate for gestational age    [place] Latch:  Efficiency:               Lips Flanged: Yes, after frenotomy              Depth of latch: Very good, after frenotomy              Audible Swallow: Yes, after frenotomy              Visible Milk: Yes, after frenotomy              Wide Open/ Asymmetrical: Yes, after frenotomy              Suck Swallow Cycle: Breathing: Unlabored, Coordinated: Yes  Nipple Assessment after latch: Normal: elongated/eraser, no discoloration and no damage noted. Latch Problems: After the frenotomy, Olya Eye is easily able to assist Terri Palmer to a wide, deep, asymmetrical and comfortable. Terri Palmer quickly attains a sustained SSB and nurses until content. Position:  Infant's Ergonomics/Body               Body Alignment: Yes               Head Supported: Yes               Close to Mom's body/ Lifted/ Supported: Yes               Mom's Ergonomics/Body: Yes                           Supported: Yes                           Sitting Back: Yes                           Brings Baby to her breast: Yes  Positioning Problems: None      Education:  Reviewed Latch: Reviewed how to gently compress the breast as if offering a sandwich to facilitate a deeper latch. Reviewed Positioning for Dyad: Reviewed how to bring baby to the breast so that his lower lip and chin touch the breast with his nose just above the nipple to encourage a wider, more asymmetric latch. Reviewed Frequency/Supply & Demand: Recommended feeding on demand: when the baby gives hunger cues, when the breasts feel full, every 3 hours during the day and every 5 hours at night counting from the beginning of one feeding to the beginning of the next; whichever comes first.   Reviewed Alternative/Artificial Feedings: Paced bottle feeding  Reviewed Mom/Breast care: Pump whenever Terri Palmer is not fed at the breast and as needed for comfort; pump or nurse every 3 hours during the day with no more than a 5-6 hour break at night        Plan:  Discussed history and physical exams with Olya García. Support given for her commitment to providing breast milk for her baby. Discussed the findings on the baby's exam consistent with tongue tie and reviewed how this may be the cause of nipple trauma, nipple pain, nipple damage, poor milk transfer, blocked ducts, mastitis, and loss of milk production. Discussed the science that supports performing the frenotomy to improve latch. I have counseled regarding Prognosis, Risks and benefits of tx options, Instructions for management, Patient and family education, Impressions, Counseling / Coordination of care, Documenting in the medical record, Reviewing / ordering tests, medicine, procedures   and Obtaining or reviewing history  .

## 2023-08-23 NOTE — PATIENT INSTRUCTIONS
Continue to nurse or pump at least every 3 hours during the day with no more than a 5-6 hour break at night. Pump whenever Terri Palmer is not fed at the breast and as needed, for comfort.

## 2023-08-31 ENCOUNTER — OFFICE VISIT (OUTPATIENT)
Dept: POSTPARTUM | Facility: CLINIC | Age: 28
End: 2023-08-31

## 2023-08-31 NOTE — PATIENT INSTRUCTIONS
Continue pumping when a feeding at the breast is missed to maintain milk production as long as desired. Please call with any questions or concerns.

## 2023-08-31 NOTE — PROGRESS NOTES
BREAST FEEDING FOLLOW UP VISIT    Informant/Relationship: Alina Thompson    Discussion of General Lactation Issues: Alina Thompson is having less pain when breastfeeding. Feedings are still long but she feels that breast compressions help. She is feeding expressed milk at times when she needs a break. Infant is 3weeks old today. Interval Breastfeeding History:  Frequency of breast feeding: Every 3 hours during the day and up to 4 hours at night  Does mother feel breastfeeding is effective: Yes, but feedings are long (up to an hour)  Does infant appear satisfied after nursing:Yes  Stooling pattern normal: Yes  Urinating frequently:Yes  Using shield or shells: No     Alternative/Artificial Feedings:   Bottle: Yes, at night instead of direct breastfeeding. Cup: No  Syringe/Finger: No           Formula Type: none                     Amount: n/a            Breast Milk:                      Amount: 3 ounces sometimes up to 4.5 ounces            Frequency Q 3-4 Hr between feedings  Elimination Problems: No        Equipment:  Nipple Shield             Type: none             Size: n/a             Frequency of Use: n/a  Pump            Type: Spectra S1            Frequency of Use: When a feeding at the breast is missed. Expresses 5 ounces each session. Shells            Type: none            Frequency of use: n/a     Equipment Problems: no     Mom:  Breast: Medium sized slightly asymmetrical breasts  L>R  Rounded shape. Closely spaced. Nipple Assessment in General: Normal: elongated/eraser, no discoloration and no damage noted. Mother's Awareness of Feeding Cues                 Recognizes: Alina Thompson admits that she is unsure at times                  Verbalizes: Alina Thompson admits that she is unsure at times.   Support System: FOB, extended family  History of Breastfeeding: none  Changes/Stressors/Violence: Alina Thompson is concerned that feedings are still so long  Concerns/Goals: Alina Thompson desires that Akshat Day gets everything he needs.     Problems with Mom: none    Physical Exam  Constitutional:       Appearance: Normal appearance. HENT:      Head: Normocephalic and atraumatic. Pulmonary:      Effort: Pulmonary effort is normal.   Musculoskeletal:         General: Normal range of motion. Cervical back: Normal range of motion and neck supple. Neurological:      Mental Status: She is alert and oriented to person, place, and time. Skin:     General: Skin is warm and dry. Psychiatric:         Mood and Affect: Mood normal.         Behavior: Behavior normal.         Thought Content: Thought content normal.         Judgment: Judgment normal.         Infant:  Behaviors: Alert  Color: Pink  Birth weight: 3705gram  Current weight: 4195gram    Problems with infant: tongue tie s/p frenotomy, feeding for long periods, falls asleep frequently while feeding    General Appearance:  Alert, active, no distress                             Head:  Normocephalic, large anterior fontanelle, sutures                              Eyes:  Conjunctiva clear, no drainage                              Ears:  Normally placed, no anomolies                             Nose:  no drainage or erythema                           Mouth:  No lesions. High narrow palate. Tongue extends just barely to the lower lip, lateralizes and elevates fairly well   Moderate cupping of my finger noted as he sucked but very little effective peristalsis. Just compression. The tongue retracted, exposing the lower alveolar ridge frequently. Healing frenotomy wound                             Neck:  Supple, symmetrical, trachea midline                 Respiratory:  No grunting, flaring, retractions, breath sounds clear and equal            Cardiovascular:  Regular rate and rhythm. No murmur. Adequate perfusion/capillary refill.  Femoral pulse present                    Abdomen:   Soft, non-tender, no masses, bowel sounds present, no HSM             Genitourinary:  Normal male, testes descended, no discharge, swelling, or pain, anus patent                          Spine:   No abnormalities noted        Musculoskeletal:  Full range of motion          Skin/Hair/Nails:   Skin warm, dry, and intact, no rashes, jaundice to nipple line                Neurologic:   No abnormal movement, tone appropriate for gestational age        Latch:  Efficiency:               Lips Flanged: upper lip flanges, lower lip tucks in              Depth of latch: fair              Audible Swallow: Yes but no sustained SSB. Dennie Beard appeared to just compress the breast with his jaw. No active suckling seen. Visible Milk: Yes              Wide Open/ Asymmetrical: Yes              Suck Swallow Cycle: Breathing: unlabored, Coordinated: yes  Nipple Assessment after latch: Flat   Latch Problems: Dennie Beard latched well without difficulty and Jean-Claudegigi Cagle had no pain but he did not appear to transfer milk effectively. He fed more effectively for a very brief time when he was positioned with a Concorde hold but then quickly transitioned back to just compressing the breast    Position:  Infant's Ergonomics/Body               Body Alignment: Yes               Head Supported: Yes               Close to Mom's body/ Lifted/ Supported: Yes               Mom's Ergonomics/Body: Yes                           Supported: Yes                           Sitting Back: Yes                           Brings Baby to her breast: Yes  Positioning Problems: None      Handouts:   None    Education:  Reviewed milk transfer. Discussed that Dennie Beard is having trouble transferring milk beyond NOEMY and this is why feedings can be very long and that he tends to fall asleep at the breast.        Plan:   Reassurance and support given. I recommended that Jean-Claude Ground continue to pump when a feeding at the breast is missed to maintain milk production as long as desired.   I encouraged her to call for more support as needed and with any questions or concerns. I have spent 60 minutes with Patient and family today in which greater than 50% of this time was spent in counseling/coordination of care regarding Instructions for management, Patient and family education and Counseling / Coordination of care.

## 2023-09-09 NOTE — PROGRESS NOTES
I have reviewed the notes, assessments, and/or procedures performed by Serafin Franco RN, IBCLC, I concur with her/his documentation of Aracelis Day MD 09/09/23

## 2023-09-21 ENCOUNTER — POSTPARTUM VISIT (OUTPATIENT)
Dept: OBGYN CLINIC | Facility: CLINIC | Age: 28
End: 2023-09-21

## 2023-09-21 VITALS
DIASTOLIC BLOOD PRESSURE: 76 MMHG | SYSTOLIC BLOOD PRESSURE: 118 MMHG | WEIGHT: 147 LBS | BODY MASS INDEX: 24.49 KG/M2 | HEIGHT: 65 IN

## 2023-09-21 PROCEDURE — 99024 POSTOP FOLLOW-UP VISIT: CPT | Performed by: PHYSICIAN ASSISTANT

## 2023-09-21 NOTE — PROGRESS NOTES
Assessment/Plan     Normal postpartum exam.     1. Contraception: condoms  2. Annual exam due in at this time. 3. Lactation consult, [address] information discussed. 4. Increase activity as tolerated, may resume all normal activity. 5. Anticipated return to work: 6 - 12 weeks post partum. 6. Keep f/u with PT scheduled        Subjective   Chief Complaint   Patient presents with   • Postpartum Care     Patient is here today for her 6 week postpartum follow up,  , baby boy(Drew), 8lbs 2.7oz. Patient is currently breastfeeding/pumping. She would like to discuss the vaginal bleeding she is still experiencing. She declines BC at this time. Kayla Benavides is a 28 y.o.  female who presents for a postpartum visit. She is feeling well today and has no complaints    Delivery Method: Vaginal, Spontaneous    Delivery Date and Time: 2023  10:16 PM   Delivery Attending: César Mcintyre   Gestational Age at delivery: 38w8d   Route of Delivery: Vaginal, Spontaneous   Admitting Diagnoses:   Patient Active Problem List   Diagnosis   • Migraine, unspecified, not intractable, without status migrainosus   •  (spontaneous vaginal delivery)   • COVID-19 affecting pregnancy in third trimester   • Encounter for supervision of normal first pregnancy, third trimester       Gestational Diabetes: no  Pregnancy Complications: gestational HTN    Anesthesia: Epidural ,     Delivery: Vaginal, Spontaneous  at 2023  10:16 PM   Laceration: bilateral vaginal lacerations  Baby's Weight: 3705 g (8 lb 2.7 oz) ; 130.69     Apgar scores: 8  and 9  at 1 and 5 minutes, respectively  Baby's Name: Ita Gongora  Breast or formula: Breastfeeding  P[facility]ian: [facility]        Bleeding minimal, using liners. Bowel function: normal. Bladder function: normal.     Patient has not been sexually active. Desired contraception method is condoms. Postpartum depression screening: negative.  EPDS : 0    Last Pap : 2021 ; no abnormalities      The following portions of the patient's history were reviewed and updated as appropriate: allergies, current medications, past family history, past medical history, past social history, past surgical history and problem list.      Current Outpatient Medications:   •  docusate sodium (Colace) 100 mg capsule, Take 100 mg by mouth 2 (two) times a day, Disp: , Rfl:   •  Prenatal MV-Min-Fe Fum-FA-DHA (PRENATAL+DHA PO), Take by mouth, Disp: , Rfl:     Allergies   Allergen Reactions   • Lactose Intolerance (Gi) - Food Allergy GI Intolerance       Review of Systems  Review of Systems   Constitutional: Negative for chills, fever and unexpected weight change. Respiratory: Negative for shortness of breath. Cardiovascular: Negative for chest pain. Gastrointestinal: Negative for abdominal pain, diarrhea, nausea and vomiting. Skin: Negative for rash. Psychiatric/Behavioral: Negative for dysphoric mood. The patient is not nervous/anxious. Objective      /76 (BP Location: Right arm, Patient Position: Sitting, Cuff Size: Standard)   Ht 5' 5" (1.651 m)   Wt 66.7 kg (147 lb)   LMP 11/07/2022   Breastfeeding Yes Comment: breastfeeding and pumping. BMI 24.46 kg/m²     Physical Exam  Constitutional:       General: She is not in acute distress. Appearance: Normal appearance. She is normal weight. She is not ill-appearing, toxic-appearing or diaphoretic. HENT:      Head: Normocephalic and atraumatic. Cardiovascular:      Rate and Rhythm: Normal rate and regular rhythm. Heart sounds: Normal heart sounds. No murmur heard. No friction rub. No gallop. Pulmonary:      Effort: Pulmonary effort is normal.      Breath sounds: Normal breath sounds. No wheezing, rhonchi or rales. Neurological:      General: No focal deficit present. Mental Status: She is alert. Skin:     General: Skin is warm and dry.    Psychiatric:         Mood and Affect: Mood normal. Behavior: Behavior normal.   Vitals reviewed.

## 2023-09-25 ENCOUNTER — EVALUATION (OUTPATIENT)
Dept: PHYSICAL THERAPY | Facility: REHABILITATION | Age: 28
End: 2023-09-25
Payer: COMMERCIAL

## 2023-09-25 DIAGNOSIS — N81.89 PELVIC FLOOR WEAKNESS: Primary | ICD-10-CM

## 2023-09-25 DIAGNOSIS — R29.898 PELVIC GIRDLE WEAKNESS: ICD-10-CM

## 2023-09-25 PROCEDURE — 97530 THERAPEUTIC ACTIVITIES: CPT | Performed by: PHYSICAL THERAPIST

## 2023-09-25 PROCEDURE — 97162 PT EVAL MOD COMPLEX 30 MIN: CPT | Performed by: PHYSICAL THERAPIST

## 2023-10-02 ENCOUNTER — OFFICE VISIT (OUTPATIENT)
Dept: PHYSICAL THERAPY | Facility: REHABILITATION | Age: 28
End: 2023-10-02
Payer: COMMERCIAL

## 2023-10-02 DIAGNOSIS — R29.898 PELVIC GIRDLE WEAKNESS: ICD-10-CM

## 2023-10-02 DIAGNOSIS — N81.89 PELVIC FLOOR WEAKNESS: Primary | ICD-10-CM

## 2023-10-02 PROCEDURE — 97112 NEUROMUSCULAR REEDUCATION: CPT | Performed by: PHYSICAL THERAPIST

## 2023-10-02 PROCEDURE — 97530 THERAPEUTIC ACTIVITIES: CPT | Performed by: PHYSICAL THERAPIST

## 2023-10-02 NOTE — PROGRESS NOTES
Daily Note     Today's date: 10/2/2023  Patient name: Rashmi Hunt  : 1995  MRN: [id number]  Referring provider: Judi Reese*  Dx:   Encounter Diagnosis     ICD-10-CM    1. Pelvic floor weakness  N81.89       2. Pelvic girdle weakness  R29.898           Start Time: 1605  Stop Time: 1700  Total time in clinic (min): 55 minutes    Subjective: The patient has no new complaints today since her initial evaluation. Objective: See treatment diary below      Assessment: Tolerated treatment well. Patient performed Biofeedback today. She demonstrated resting baseline at 3.0 mV or below today. She had further reduction in resting muscle tone with subsequent contractions. Her coordination also improved, with noted improvement in rate of muscle recruitment as well. She had good recruitment overall, 20.0 mV on average. She also had good awareness in supine, sitting, and standing. She was able to maintain pelvic floor contractions with endurance exercises approximately 5-10 seconds. She was given HEP for home including TA engagement. She will return in one week for her next visit. Plan: Continue per plan of care.       Precautions: 7 weeks post partum  [employer] HEP    Manuals 9/25 10/2                                                               Neuro Re-Ed             Diaphragmatic Breathing             TA ADIM  5"x10           TA + PFMC  10x                        Biofeedback sEMG             PFMC: Slow Holds  10x           PFMC: Quick Flicks  82D           PFMC + hip abd isometrics             PFMC + hip add isometrics             PFMC + ecc bridge             PFMC in sitting  10x QF's  5x SH's           PFMC seated + tilt             PFMC in standing             PFMC in Quadruped                                                                 Ther Ex             PPT             Bridge + SLR             Bridge + marching Ther Activity             EDUCATION 15 min 10 min           PFMC + reach             PFMC + squat             PFMC + lift             PFMC + sit to stand             PFMC + step up/down             Gait Training                                       Modalities

## 2023-10-05 ENCOUNTER — APPOINTMENT (OUTPATIENT)
Dept: PHYSICAL THERAPY | Facility: REHABILITATION | Age: 28
End: 2023-10-05
Payer: COMMERCIAL

## 2023-10-09 ENCOUNTER — OFFICE VISIT (OUTPATIENT)
Dept: PHYSICAL THERAPY | Facility: REHABILITATION | Age: 28
End: 2023-10-09
Payer: COMMERCIAL

## 2023-10-09 DIAGNOSIS — N81.89 PELVIC FLOOR WEAKNESS: Primary | ICD-10-CM

## 2023-10-09 DIAGNOSIS — R29.898 PELVIC GIRDLE WEAKNESS: ICD-10-CM

## 2023-10-09 PROCEDURE — 97112 NEUROMUSCULAR REEDUCATION: CPT | Performed by: PHYSICAL THERAPIST

## 2023-10-09 PROCEDURE — 97530 THERAPEUTIC ACTIVITIES: CPT | Performed by: PHYSICAL THERAPIST

## 2023-10-09 NOTE — PROGRESS NOTES
Daily Note     Today's date: 10/9/2023  Patient name: Darrell Manriquez  : 1995  MRN: [id number]  Referring provider: Gurmeet Weeks*  Dx:   Encounter Diagnosis     ICD-10-CM    1. Pelvic floor weakness  N81.89       2. Pelvic girdle weakness  R29.898           Start Time: 1005  Stop Time: 1100  Total time in clinic (min): 55 minutes    Subjective: The patient has been compliant with her exercises since her last visit. She notes no soreness in regards to the exercises. No other complaints of pain. Objective: See treatment diary below      Assessment: Tolerated treatment well. Patient did well with new exercises today. She demonstrated good form and coordination with her core and pelvic floor. Updated HEP for home today. Also talked about progressions of current exercises for the future as well. Also discussed good body mechanics and engaging core/pelvic floor with lifting the baby as well as lifting the baby car carrier. She will return in one week for her next visit. Plan: Continue per plan of care.       Precautions: 8 weeks post partum  [facility] HEP    Manuals 9/25 10/2 10/9                                                              Neuro Re-Ed             Diaphragmatic Breathing             TA ADIM  5"x10           TA + PFMC  10x                        Biofeedback sEMG             PFMC: Slow Holds  10x           PFMC: Quick Flicks  79K           PFMC + hip abd isometrics   10x   supine          PFMC + hip add isometrics   10x supine          PFMC + ecc bridge   10x          PFMC in sitting  10x QF's  5x SH's           PFMC seated + tilt             PFMC in standing             PFMC in Quadruped             TA+ marching   2x10  GTB                                                 Ther Ex             PPT             Bridge + SLR             Bridge + marching             Clamshells + TA   2x10                                                              Ther Activity EDUCATION 15 min 10 min           PFMC + reach             PFMC + squat   10x no weight          PFMC + lift   8#  10x sitting  10x standing          PFMC + sit to stand   8#  10x           PFMC + step up/down             Gait Training                                       Modalities

## 2023-10-13 ENCOUNTER — TELEPHONE (OUTPATIENT)
Dept: NEUROLOGY | Facility: CLINIC | Age: 28
End: 2023-10-13

## 2023-10-13 NOTE — TELEPHONE ENCOUNTER
Pt current auth on file with [facility] is set to  2023 before pt upcoming appt 11/10/2023. New PA sent Electronically.

## 2023-10-17 ENCOUNTER — OFFICE VISIT (OUTPATIENT)
Dept: PHYSICAL THERAPY | Facility: REHABILITATION | Age: 28
End: 2023-10-17
Payer: COMMERCIAL

## 2023-10-17 DIAGNOSIS — R29.898 PELVIC GIRDLE WEAKNESS: ICD-10-CM

## 2023-10-17 DIAGNOSIS — N81.89 PELVIC FLOOR WEAKNESS: Primary | ICD-10-CM

## 2023-10-17 PROCEDURE — 97110 THERAPEUTIC EXERCISES: CPT | Performed by: PHYSICAL THERAPIST

## 2023-10-17 PROCEDURE — 97530 THERAPEUTIC ACTIVITIES: CPT | Performed by: PHYSICAL THERAPIST

## 2023-10-17 PROCEDURE — 97112 NEUROMUSCULAR REEDUCATION: CPT | Performed by: PHYSICAL THERAPIST

## 2023-10-17 NOTE — PROGRESS NOTES
Daily Note     Today's date: 10/17/2023  Patient name: Darryl Huddleston  : 1995  MRN: [id number]  Referring provider: Gus Cortes*  Dx:   Encounter Diagnosis     ICD-10-CM    1. Pelvic floor weakness  N81.89       2. Pelvic girdle weakness  R29.898           Start Time: 1400  Stop Time: 1500  Total time in clinic (min): 60 minutes    Subjective: The patient notes that she has been doing well with her exercises and has no complaints in regards to this. Objective: See treatment diary below      Assessment: Tolerated treatment well. Patient  is progressing well at this time. She is 9 weeks post partum. She notes feeling some muscle fatigue with exercises with some pelvic girdle instability noted. No reports of pain and good awareness of deep core engagement with exercises. Updated HEP for home. She will return in one week for her next visit. Plan: Continue per plan of care.       Precautions: 9 weeks post partum  [employer] HEP: [id number]     Manuals 9/25 10/2 10/9 10/17                                                             Neuro Re-Ed             Diaphragmatic Breathing             TA ADIM  5"x10           TA + PFMC  10x                        Biofeedback sEMG             PFMC: Slow Holds  10x           PFMC: Quick Flicks  27T           PFMC + hip abd isometrics   10x   supine 10x pink tband seated         PFMC + hip add isometrics   10x supine 10x seated         PFMC + ecc bridge   10x 10#  10x          PFMC in sitting  10x QF's  5x SH's           PFMC seated + tilt             PFMC in standing             PFMC in Quadruped             TA+ marching   2x10 2 reps x 10   Pink tband         SL bridge    10x          SLR with sheet for brace    10x ea R and L                      Ther Ex             PPT             Bridge + SLR             Bridge + marching             Clamshells + TA   2x10 2x10  Pink tband         Tband rows + PFMC    10x   Pink tband         Tband low row + PFMC 10x pink tband                                   Ther Activity             EDUCATION 15 min 10 min           PFMC + reach             PFMC + squat   10x no weight          PFMC + lift   8#  10x sitting  10x standing Sitting  10#  10x          PFMC + sit to stand   8#  10x  10#  10x          PFMC + step up/down             Gait Training                                       Modalities

## 2023-10-19 ENCOUNTER — TELEPHONE (OUTPATIENT)
Dept: NEUROLOGY | Facility: CLINIC | Age: 28
End: 2023-10-19

## 2023-10-19 NOTE — TELEPHONE ENCOUNTER
Called and spoke to patient to confirm their upcoming appointment with Dr. Antonio Valentin. Informed patient about arriving in the [place] location 15 minutes prior to their appointment to get checked in and going over chart.

## 2023-10-23 ENCOUNTER — OFFICE VISIT (OUTPATIENT)
Dept: PHYSICAL THERAPY | Facility: REHABILITATION | Age: 28
End: 2023-10-23
Payer: COMMERCIAL

## 2023-10-23 DIAGNOSIS — N81.89 PELVIC FLOOR WEAKNESS: Primary | ICD-10-CM

## 2023-10-23 DIAGNOSIS — R29.898 PELVIC GIRDLE WEAKNESS: ICD-10-CM

## 2023-10-23 PROCEDURE — 97110 THERAPEUTIC EXERCISES: CPT | Performed by: PHYSICAL THERAPIST

## 2023-10-23 PROCEDURE — 97530 THERAPEUTIC ACTIVITIES: CPT | Performed by: PHYSICAL THERAPIST

## 2023-10-23 PROCEDURE — 97112 NEUROMUSCULAR REEDUCATION: CPT | Performed by: PHYSICAL THERAPIST

## 2023-10-23 NOTE — PROGRESS NOTES
Daily Note and Discharge    Today's date: 10/23/2023  Patient name: Luz Bloom  : 1995  MRN: [id number]  Referring provider: Ling Rayo*  Dx:   Encounter Diagnosis     ICD-10-CM    1. Pelvic floor weakness  N81.89       2. Pelvic girdle weakness  R29.898           Start Time: 1400  Stop Time: 1500  Total time in clinic (min): 60 minutes    Subjective: The patient notes that she has no pelvic pain or pelvic floor symptoms such as leakage. She does feel that her core is regaining some strength and muscle tone. Objective: See treatment diary below      Assessment: Tolerated treatment well. Patient  demonstrates good form with exercises today. Today is her last visit and she is doing well overall. Did discuss continuation of HEP as well as possible future progressions. Updated HEP for home today. She will be discharged to HEP today.        Plan:  discharge to [facility]     Precautions: 10 weeks post partum  [facility] HEP: [id number]     Manuals 9/25 10/2 10/9 10/17 10/23                                                            Neuro Re-Ed             Diaphragmatic Breathing             TA ADIM  5"x10           TA + PFMC  10x                        Biofeedback sEMG             PFMC: Slow Holds  10x           PFMC: Quick Flicks  67B           PFMC + hip abd isometrics   10x   supine 10x pink tband seated         PFMC + hip add isometrics   10x supine 10x seated         PFMC + ecc bridge   10x 10#  10x  10#  10x with PFMC  10x without        PFMC in sitting  10x QF's  5x SH's           PFMC seated + tilt             PFMC in standing             TA + PFMC in Quadruped     2x5        TA+ marching   2x10 2 reps x 10   Pink tband 2 reps x10 green theraband        SL bridge    10x          SLR with sheet for brace    10x ea R and L 2x10        SLR - sidelying abduction     2x10        Ther Ex             PPT             Bridge + SLR             Bridge + marching             Clamshells + TA   2x10 2x10  Pink tband 2x10   Green tband        Tband rows + PFMC    10x   Pink tband         Tband low row + PFMC    10x pink tband         Paloff press + PFMC     5"x10  GTB        Tband lunge      10x ea GTB        Donkey kicks     10x        Ther Activity             EDUCATION 15 min 10 min           PFMC + reach             PFMC + squat   10x no weight          PFMC + lift   8#  10x sitting  10x standing Sitting  10#  10x  Sitting 10#  10x     Standing  10#  10x        PFMC + sit to stand   8#  10x  10#  10x  10#   10x with PFMC   10x without        PFMC + step up/down             Gait Training                                       Modalities

## 2023-10-24 ENCOUNTER — OFFICE VISIT (OUTPATIENT)
Dept: NEUROLOGY | Facility: CLINIC | Age: 28
End: 2023-10-24
Payer: COMMERCIAL

## 2023-10-24 VITALS
TEMPERATURE: 95.8 F | WEIGHT: 148 LBS | DIASTOLIC BLOOD PRESSURE: 76 MMHG | HEIGHT: 65 IN | SYSTOLIC BLOOD PRESSURE: 115 MMHG | HEART RATE: 57 BPM | BODY MASS INDEX: 24.66 KG/M2

## 2023-10-24 DIAGNOSIS — G43.709 CHRONIC MIGRAINE WITHOUT AURA WITHOUT STATUS MIGRAINOSUS, NOT INTRACTABLE: Primary | ICD-10-CM

## 2023-10-24 PROBLEM — Z34.03 ENCOUNTER FOR SUPERVISION OF NORMAL FIRST PREGNANCY, THIRD TRIMESTER: Status: RESOLVED | Noted: 2023-07-05 | Resolved: 2023-10-24

## 2023-10-24 PROCEDURE — 99214 OFFICE O/P EST MOD 30 MIN: CPT | Performed by: PSYCHIATRY & NEUROLOGY

## 2023-10-24 NOTE — PROGRESS NOTES
[facility] Consult - Follow up   PATIENT:  Caroline Woodward  MRN:  [id number]  :  1995  DATE OF SERVICE:  10/24/2023  REFERRED BY: No ref. provider found  PMD: Carloyn Halsted, MD    Assessment/Plan:   Caroline Woodward is a delightful 33 y.o. female with a past medical history that includes vitamin-D deficiency, anxiety, migraines referred here for evaluation of headache. My initial evaluation 2021     Chronic migraine without aura without status migrainosus, not intractable  Currently breastfeeding  She reports a long history of headaches and migraines dating back to around age 20. They were very difficult to control despite multiple medication trials prior to starting emgality injections for which she has had significant improvement. She previously followed with my Neurology colleague Dr. Atilio Rojas, please see EMR for details. Pain is typically bifrontal with typical associated migrainous features, denies aura. -   In  prior to starting emgality she had over 20 migraine days per month, often nearly daily  - as of 2021: she reports continued improvement on emgality, about 5 migraines a month that are moderate usually and typically respond to ibuprofen. Continue emgality. - as of 8/3/2022: self discontinued emgality in Dec due to considering pregnancy at some point in the future. As expected doing worse off of it with over 15 migraines a month, lasting over 4 hours, for over 3 months that improve only a bit with OTC meds. Trial of botox for prevention. We discussed some options she could consider until pregnancy such as nurtec, she is not interested at this time. Did recommend restarting magnesium, riboflavin and vitamin D.   - as of 2023: She reports significant improvement with reduction in over 7 migraine days per month on Botox.   She reports she was getting headaches daily up until mid February and then headaches significantly improved since she completed her board exam mid March suggesting stress contributing. She reports no migraines in the past month after 1 round of Botox 2/10/2023. She is also 23 weeks pregnant and we discussed medications thought to be safest during pregnancy and breast-feeding. She has not even needed acetaminophen/Tylenol in a month. - as of 10/24/2023: She gave birth 8/12/2023 and reports headaches and migraines generally were better during pregnancy and a little bit worse during breast-feeding which is typical.  On average 1-2 milder headaches a week and thankfully not terrible like they were in the past due to such a significant reduction while on Botox. She is currently breast-feeding and ibuprofen typically helps and if not Excedrin without aspirin does and we discussed which medications thought to be safest during breast-feeding. Discussed if ever needed could consider sleep study. Workup:  - MRI Brain 12/30/14:  No intracranial MR abnormality to account for the patient's symptoms. Left maxillary sinus retention cyst. Per radiology    Preventative:  - we discussed headache hygiene and lifestyle factors that may improve headaches  - botox every 91 days. Discussed proper use, possible side effects and risks. - We discussed headache preventative supplements that can be helpful including magnesium, riboflavin, vitamin D  - Past/ failed/contraindicated:  Lamictal, duloxetine, cyproheptadine, amitriptyline, venlafaxine, sertraline, zonisamide, aimovig  - future options post breast feeding: resume emgality since helped, others     Abortive:  - discussed not taking over-the-counter or prescription pain medications more than 3 days per week to prevent medication overuse/rebound headache  -   Acetaminophen or Excedrin tension/acetaminophen-caffeine helps Discussed proper use, possible side effects and risks.   -  Past/ helped:  Decadron 2 mg, patient does not recall but per Dr. Forest Vivar Notes Olanzapine 5 mg also helped as backup, unclear if p.o. or IM Toradol  Or Depakote helped or not but has had  - Past/ failed/contraindicated:  IV Toradol, prochlorperazine, sumatriptan, rizatriptan, frovatriptan, diphenhydramine  - future options: Toradol IM or p.o., could consider trial for 5 days of Depakote or dexamethasone 4 prolonged migraine, ubrelvy, reyvow, nurtec      Patient instructions      Could add 1000 units Vitamin D daily   Let me know if you would ever like a sleep study ordered      Headache/migraine treatment:   Abortive medications (for immediate treatment of a headache): It is ok to take acetaminophen if they help your headaches you should limit these to No more than 3 times a week to avoid medication overuse/rebound headaches. **Please do call if you ever would like me to send in for Nurtec which would need a prior authorization paper typically stating that you tried to triptans in the past which you have tried more than 2 and once this is approved it may be expensive and there is a coupon card at their website if needed for the co-pay. As we discussed with this 1, the more you take it also can help with prevention and you technically could take it every day prior to Botox if you have wearing off effect.   Also it does not interact with any over-the-counter medications if you happen to take those first.    These medications are thought to be compatible with breast-feeding:  Ibuprofen, acetaminophen, triptans  Probably compatible with breast-feeding:  Diphenhydramine/Benadryl, metoclopramide/Reglan, ondansetron /Zofran, if needed steroids, nurtec, prochlorperazine/Compazine  Avoid: Aspirin, DHE/ergot's, lasmiditan/Reyvow      Consider Excedrin tension rather than Excedrin Migraine which does not have aspirin      Over the counter preventive supplements for headaches/migraines (if you try, try for 3 months straight)  (to take every day to help prevent headaches - not to take at the time of headache):  (there are combo pills online of these)  [] Magnesium 400mg daily (If any diarrhea or upset stomach, decrease dose  as tolerated)  [] Riboflavin (Vitamin B2)  400mg daily (adults) (FYI B2 may make your urine bright/neon yellow)       Prescription preventive medications for headaches/migraines   (to take every day to help prevent headaches - not to take at the time of headache):  [x] botox every 91 days     *Typically these types of medications take time untill you see the benefit, although some may see improvement in days, often it may take weeks, especially if the medication is being titrated up to a beneficial level. Please contact us if there are any concerns or questions regarding the medication. Lifestyle Recommendations:  [x] SLEEP - Maintain a regular sleep schedule: Adults need at least 7-8 hours of uninterrupted a night. Maintain good sleep hygiene:  Going to bed and waking up at consistent times, avoiding excessive daytime naps, avoiding caffeinated beverages in the evening, avoid excessive stimulation in the evening and generally using bed primarily for sleeping. One hour before bedtime would recommend turning lights down lower, decreasing your activity (may read quietly, listen to music at a low volume). When you get into bed, should eliminate all technology (no texting, emailing, playing with your phone, iPad or tablet in bed). [x] HYDRATION - Maintain good hydration. Drink  2L of fluid a day (4 typical small water bottles)  [x] DIET - Maintain good nutrition. In particular don't skip meals and try and eat healthy balanced meals regularly. [x] TRIGGERS - Look for other triggers and avoid them: Limit caffeine to 1-2 cups a day or less. Avoid dietary triggers that you have noticed bring on your headaches (this could include aged cheese, peanuts, MSG, aspartame and nitrates).   [x] EXERCISE - physical exercise as we all know is good for you in many ways, and not only is good for your heart, but also is beneficial for your mental health, cognitive health and  chronic pain/headaches. I would encourage at the least 5 days of physical exercise weekly for at least 30 minutes. Education and Follow-up  [x] Please call with any questions or concerns. Of course if any new concerning symptoms go to the emergency department. [x] Follow up 1 year, sooner if needed     CC: We had the pleasure of evaluating Huey Naranjo in neurological consultation today. Huey Naranjo is a   right handed female who presents today for evaluation of headaches. History obtained from patient as well as available medical record review. History of Present Illness:   Interval history as of 10/24/2023  - no significant new or concerning neurologic symptoms since last visit   - sleep - not good now due to , before this at least 8-9 hours, sleeps on side and belly and now side and belly again, has been she snored, sleep is not always restful, problems staying asleep at times, she will let me know if she ever like a sleep study at this point she is up with the , dad has CELINE    Gave birth - 22  Breast feeding - prob around a year     Headaches and migraines     1-2 a week - not terrible like in the past, just kid of annoying and improve with ibuprofen     Feels like botox is helping, better than a year ago   She reports a reduction over 7 migraine days per month after 6 months trial of botox     otis is nursing and her headaches have been a little worse since giving birth 9 weeks ago but not horrible. She gets 1-2 a week and normally ibuprofen helps and if not excedrin helps but she only had to take that a couple times in the last 10 weeks.      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, othropiedic PT    Preventative:   -Trial of botox  -started 2/10/2023    Abortive:   - has not needed tylenol in a month   Denies bothersome side effects         Interval history as of 8/3/2022  - denies any new or concerning neurologic symptoms since last visit   - considering trying for pregnancy soon     Headaches and migraines   - improved on emgality and stopped in Dec  - now 3-4 migraines a week that sometimes respond to exedrin or advil    Preventative:   - stopped emgality in Dec    -  Continue magnesium, consider adding riboflavin, vitamin D     Abortive:   ibuprofen      History as of initial visit 8/2021:  Former patient of Dr. Antoine Chinchilla  - last saw her 9/6/19  - has been well controlled on emgality      Headaches started at what age? 20years old  How often do the headaches occur?   -before emgality she had over 20 migraine days per month  - as of 8/2/2021: she reports continued improvement on emgality, about 5 a month that are moderate usually and typically respond to ibuprofen   What time of the day do the headaches start? No particular time of day   How long do the headaches last? Over 4 hours, typically improve with sleep, in the pastr up to days    Are you ever headache free? Yes    Aura? without aura     Last eye exam: years ago, but no eye symptoms   - stopped wearing glasses because did not need them    Where is your headache located and pain quality?   - bifrontal pounding throbbing pressure   What is the intensity of pain?  Average: 5-7/10, worst lately worst 7 - past 10/10 - ED infusions years ago   Associated symptoms: a lot of these not anymore   [x] Nausea       [] Vomiting        [] Diarrhea  [x] Problems with concentration  [x] Photophobia  >   [x]Phonophobia      [x] Osmophobia  [] Blurred vision   [x] Prefer quiet, dark room  [] Light-headed or dizzy     [] Tinnitus    [] Hands or feet tingle or feel numb/paresthesias      [] Ptosis      [] Facial droop  [] Lacrimation  [] Nasal congestion/rhinorrhea      Number of days missed per month because of headaches:  Work (or school) days: 0    Headache triggers:  Caffeine    Have you seen someone else for headaches or pain? Yes, Dr. Shirley Found neurology/headache specialist   Have you had trigger point injection performed and how often? No  Have you had Botox injection performed and how often? No   Have you had epidural injections or transforaminal injections performed? No  Are you current pregnant or planning on getting pregnant? No, not for a few years   Have you ever had any Brain imaging? yes     What medications do you take or have you taken for your headaches? ABORTIVE:      Ibuprofen usually helps   exedrin - may have rebound headache next day     Past   Toradol p.o. DHE  Depakote  IV Toradol not affected in the past  Dexamethasone a - helped - has not needed in 2 years   olanzapine 5 mg - helped as back up  - has not needed in 2 years  Prochlorperazine/Compazine did not help  Sumatriptan headache came back in 2 hours  Rizatriptan did not help  Frovatriptan did not help  Diphenhydramine/Benadryl    PREVENTIVE:     emgality - no side effects   Magnesium     Past/ failed/contraindicated:  lamictal 50/100 mg - off since maybe around 9/2019  duloxetine/cymbalta 40 mg   - off since maybe around 9/2019  Cyproheptadine 4 mg  Amitriptyline  Venlafaxine side effects of sedating  Sertraline side effects of anxiety  Zonisamide  aimovig       Alternative therapies used in the past for headaches?  Chiropractor     LIFESTYLE  Sleep   - averages: 8 hours   Problems falling asleep?:   No  Problems staying asleep?:  No    Physical activity: walking running on feet at work all day     Water: maybe 20 oz a day   Caffeine: 0 per day    Mood:  anxiety - was during PT school, better now     The following portions of the patient's history were reviewed and updated as appropriate: allergies, current medications, past family history, past medical history, past social history, past surgical history and problem list.    Pertinent family history:  Family history of headaches:  migraine headaches in grandmother  Any family history of aneurysms - No    Pertinent social history:  Work: PT   Lives with      Illicit Drugs: denies  Alcohol/tobacco: Denies tobacco use, alcohol intake: social drinker    Past Medical History:     Past Medical History:   Diagnosis Date    Migraine        Patient Active Problem List   Diagnosis    Chronic migraine without aura without status migrainosus, not intractable       Medications:      Current Outpatient Medications   Medication Sig Dispense Refill    docusate sodium (Colace) 100 mg capsule Take 100 mg by mouth 2 (two) times a day      Prenatal MV-Min-Fe Fum-FA-DHA (PRENATAL+DHA PO) Take by mouth       No current facility-administered medications for this visit. Allergies:       Allergies   Allergen Reactions    Lactose Intolerance (Gi) - Food Allergy GI Intolerance       Family History:     Family History   Problem Relation Age of Onset    Arrhythmia Mother     Hyperlipidemia Father     No Known Problems Sister     No Known Problems Maternal Grandmother     Alzheimer's disease Maternal Grandfather     Breast cancer Paternal Grandmother     Dementia Paternal Grandfather     Colon cancer Maternal Uncle        Social History:     Social History     Socioeconomic History    Marital status: /Civil Union     Spouse name: Not on file    Number of children: Not on file    Years of education: Not on file    Highest education level: Not on file   Occupational History    Not on file   Tobacco Use    Smoking status: Never    Smokeless tobacco: Never   Vaping Use    Vaping Use: Never used   Substance and Sexual Activity    Alcohol use: Yes     Comment: socially--none since confirmed pregnancy    Drug use: No    Sexual activity: Not Currently     Partners: Male     Birth control/protection: None   Other Topics Concern    Not on file   Social History Narrative    Not on file     Social Determinants of Health     Financial Resource Strain: Not on file   Food Insecurity: Not on file   Transportation Needs: Not on file   Physical Activity: Not on file   Stress: Not on file   Social Connections: Not on file   Intimate Partner Violence: Not on file   Housing Stability: Not on file         Objective:       Physical Exam:                                                                 Vitals:            Constitutional:    /76 (BP Location: Right arm, Patient Position: Sitting, Cuff Size: Standard)   Pulse 57   Temp (!) 95.8 °F (35.4 °C) (Temporal)   Ht 5' 5" (1.651 m)   Wt 67.1 kg (148 lb)   BMI 24.63 kg/m²   BP Readings from Last 3 Encounters:   10/24/23 115/76   09/21/23 118/76   08/23/23 116/78     Pulse Readings from Last 3 Encounters:   10/24/23 57   08/14/23 67   08/11/23 67         Well developed, well nourished, well groomed. Psychiatric:  Normal behavior and appropriate affect        Neurological Examination:     Mental status/cognitive function:   Recent and remote memory appear intact. Attention span and concentration as well as fund of knowledge are appropriate for age. Normal language and spontaneous speech. Cranial Nerves:   VII-facial expression symmetric  Motor Exam: symmetric bulk throughout. no atrophy, fasciculations or abnormal movements noted.    Coordination:  no apparent dysmetria, ataxia or tremor noted  Gait: steady casual gait         Pertinent lab results:   See EMR for recent labs  - 02/21/2019:  Vitamin-D 26.2, B12 364  - 11/21/2018 BMP was clipped CO2 20, CBC with WBC 10.18  TSH normal     Imaging: I have personally reviewed imaging and radiology read   - MRI Brain 12/30/14:  No intracranial MR abnormality to account for the patient's symptoms. Left maxillary sinus retention cyst.   Review of Systems:   ROS obtained by medical assistant and personally reviewed, but if any symptoms listed below say negative, does not mean patient has not had this symptom since last visit, please see HPI for details of symptoms discussed this visit. I recommended PCP follow up for non neurologic problems. Review of Systems   Constitutional:  Negative for appetite change and fever. HENT: Negative. Negative for hearing loss, tinnitus, trouble swallowing and voice change. Eyes: Negative. Negative for photophobia and pain. Respiratory: Negative. Negative for shortness of breath. Cardiovascular: Negative. Negative for palpitations. Gastrointestinal: Negative. Negative for nausea and vomiting. Endocrine: Negative. Negative for cold intolerance. Genitourinary: Negative. Negative for dysuria, frequency and urgency. Musculoskeletal: Negative. Negative for myalgias and neck pain. Skin: Negative. Negative for rash. Neurological:  Positive for headaches. Negative for dizziness, tremors, seizures, syncope, facial asymmetry, speech difficulty, weakness, light-headedness and numbness. Hematological: Negative. Does not bruise/bleed easily. Psychiatric/Behavioral: Negative. Negative for confusion, hallucinations and sleep disturbance       I have spent 21 minutes with Patient  today in which greater than 50% of this time was spent in counseling/coordination of care regarding Diagnostic results, Prognosis, Risks and benefits of tx options, Instructions for management, Patient  education, Importance of tx compliance, Risk factor reductions, Impressions, Counseling / Coordination of care, Documenting in the medical record, Reviewing / ordering tests, medicine, procedures  , and Obtaining or reviewing history  .  I also spent 10 minutes non face to face for this patient the same day.        Author:  Lucas Frey MD 10/24/2023 6:39 PM

## 2023-10-24 NOTE — PROGRESS NOTES
Review of Systems   Constitutional:  Negative for appetite change and fever. HENT: Negative. Negative for hearing loss, tinnitus, trouble swallowing and voice change. Eyes: Negative. Negative for photophobia and pain. Respiratory: Negative. Negative for shortness of breath. Cardiovascular: Negative. Negative for palpitations. Gastrointestinal: Negative. Negative for nausea and vomiting. Endocrine: Negative. Negative for cold intolerance. Genitourinary: Negative. Negative for dysuria, frequency and urgency. Musculoskeletal: Negative. Negative for myalgias and neck pain. Skin: Negative. Negative for rash. Neurological:  Positive for headaches. Negative for dizziness, tremors, seizures, syncope, facial asymmetry, speech difficulty, weakness, light-headedness and numbness. Hematological: Negative. Does not bruise/bleed easily. Psychiatric/Behavioral: Negative. Negative for confusion, hallucinations and sleep disturbance.

## 2023-10-24 NOTE — PATIENT INSTRUCTIONS
Could add 1000 units Vitamin D daily   Let me know if you would ever like a sleep study ordered      Headache/migraine treatment:   Abortive medications (for immediate treatment of a headache): It is ok to take acetaminophen if they help your headaches you should limit these to No more than 3 times a week to avoid medication overuse/rebound headaches. **Please do call if you ever would like me to send in for Nurtec which would need a prior authorization paper typically stating that you tried to triptans in the past which you have tried more than 2 and once this is approved it may be expensive and there is a coupon card at their website if needed for the co-pay. As we discussed with this 1, the more you take it also can help with prevention and you technically could take it every day prior to Botox if you have wearing off effect.   Also it does not interact with any over-the-counter medications if you happen to take those first.    These medications are thought to be compatible with breast-feeding:  Ibuprofen, acetaminophen, triptans  Probably compatible with breast-feeding:  Diphenhydramine/Benadryl, metoclopramide/Reglan, ondansetron /Zofran, if needed steroids, nurtec, prochlorperazine/Compazine  Avoid: Aspirin, DHE/ergot's, lasmiditan/Reyvow      Consider Excedrin tension rather than Excedrin Migraine which does not have aspirin      Over the counter preventive supplements for headaches/migraines (if you try, try for 3 months straight)  (to take every day to help prevent headaches - not to take at the time of headache):  (there are combo pills online of these)  [] Magnesium 400mg daily (If any diarrhea or upset stomach, decrease dose  as tolerated)  [] Riboflavin (Vitamin B2)  400mg daily (adults) (FYI B2 may make your urine bright/neon yellow)       Prescription preventive medications for headaches/migraines   (to take every day to help prevent headaches - not to take at the time of headache):  [x] botox every 91 days     *Typically these types of medications take time untill you see the benefit, although some may see improvement in days, often it may take weeks, especially if the medication is being titrated up to a beneficial level. Please contact us if there are any concerns or questions regarding the medication. Lifestyle Recommendations:  [x] SLEEP - Maintain a regular sleep schedule: Adults need at least 7-8 hours of uninterrupted a night. Maintain good sleep hygiene:  Going to bed and waking up at consistent times, avoiding excessive daytime naps, avoiding caffeinated beverages in the evening, avoid excessive stimulation in the evening and generally using bed primarily for sleeping. One hour before bedtime would recommend turning lights down lower, decreasing your activity (may read quietly, listen to music at a low volume). When you get into bed, should eliminate all technology (no texting, emailing, playing with your phone, iPad or tablet in bed). [x] HYDRATION - Maintain good hydration. Drink  2L of fluid a day (4 typical small water bottles)  [x] DIET - Maintain good nutrition. In particular don't skip meals and try and eat healthy balanced meals regularly. [x] TRIGGERS - Look for other triggers and avoid them: Limit caffeine to 1-2 cups a day or less. Avoid dietary triggers that you have noticed bring on your headaches (this could include aged cheese, peanuts, MSG, aspartame and nitrates). [x] EXERCISE - physical exercise as we all know is good for you in many ways, and not only is good for your heart, but also is beneficial for your mental health, cognitive health and  chronic pain/headaches. I would encourage at the least 5 days of physical exercise weekly for at least 30 minutes. Education and Follow-up  [x] Please call with any questions or concerns. Of course if any new concerning symptoms go to the emergency department.   [x] Follow up 1 year, sooner if needed

## 2023-11-10 ENCOUNTER — PROCEDURE VISIT (OUTPATIENT)
Dept: NEUROLOGY | Facility: CLINIC | Age: 28
End: 2023-11-10
Payer: COMMERCIAL

## 2023-11-10 VITALS — SYSTOLIC BLOOD PRESSURE: 129 MMHG | DIASTOLIC BLOOD PRESSURE: 88 MMHG | HEART RATE: 75 BPM | TEMPERATURE: 98.2 F

## 2023-11-10 DIAGNOSIS — G43.709 CHRONIC MIGRAINE WITHOUT AURA WITHOUT STATUS MIGRAINOSUS, NOT INTRACTABLE: Primary | ICD-10-CM

## 2023-11-10 PROCEDURE — 64615 CHEMODENERV MUSC MIGRAINE: CPT | Performed by: PHYSICIAN ASSISTANT

## 2023-11-10 NOTE — PROGRESS NOTES
Universal Protocol   Consent: Verbal consent obtained. Written consent obtained. Risks and benefits: risks, benefits and alternatives were discussed  Consent given by: patient  Time out: Immediately prior to procedure a "time out" was called to verify the correct patient, procedure, equipment, support staff and site/side marked as required. Patient understanding: patient states understanding of the procedure being performed  Patient consent: the patient's understanding of the procedure matches consent given  Procedure consent: procedure consent matches procedure scheduled  Relevant documents: relevant documents present and verified  Patient identity confirmed: verbally with patient      Chemodenervation     Date/Time  11/10/2023 12:30 PM     Performed by  Katrina Mijares PA-C   Authorized by  Katrina Mijares PA-C     Pre-procedure details      Prepped With: Alcohol     Anesthesia  (see MAR for exact dosages):      Anesthesia method:  None   Procedure details      Position:  Upright   Botox      Botox Type:  Type A    Brand:  Botox    mL's of Botulinum Toxin:  200    Final Concentration per CC:  50 units    Needle Gauge:  30 G 2.5 inch   Procedures      Botox Procedures: chronic headache      Indications: migraines     Injection Location      Head / Face:  L superior cervical paraspinal, R superior cervical paraspinal, L , R , L frontalis, R frontalis, L medial occipitalis, R medial occipitalis, procerus, R temporalis, L temporalis, R superior trapezius and L superior trapezius    L  injection amount:  5 unit(s)    R  injection amount:  5 unit(s)    L lateral frontalis:  5 unit(s)    R lateral frontalis:  5 unit(s)    L medial frontalis:  5 unit(s)    R medial frontalis:  5 unit(s)    L temporalis injection amount:  20 unit(s)    R temporalis injection amount:  20 unit(s)    Procerus injection amount:  5 unit(s)    L medial occipitalis injection amount:  15 unit(s)    R medial occipitalis injection amount:  15 unit(s)    L superior cervical paraspinal injection amount:  10 unit(s)    R superior cervical paraspinal injection amount:  10 unit(s)    L superior trapezius injection amount:  15 unit(s)    R superior trapezius injection amount:  15 unit(s)   Total Units      Total units used:  200    Total units discarded:  0   Post-procedure details      Chemodenervation:  Chronic migraine    Facial Nerve Location[de-identified]  Bilateral facial nerve    Patient tolerance of procedure:   Tolerated well, no immediate complications   Comments       45 units frontalis  All medically necessary

## 2023-12-20 ENCOUNTER — ANNUAL EXAM (OUTPATIENT)
Dept: OBGYN CLINIC | Facility: CLINIC | Age: 28
End: 2023-12-20
Payer: COMMERCIAL

## 2023-12-20 VITALS
SYSTOLIC BLOOD PRESSURE: 130 MMHG | HEIGHT: 65 IN | BODY MASS INDEX: 24.66 KG/M2 | WEIGHT: 148 LBS | DIASTOLIC BLOOD PRESSURE: 82 MMHG

## 2023-12-20 DIAGNOSIS — Z01.419 WOMEN'S ANNUAL ROUTINE GYNECOLOGICAL EXAMINATION: Primary | ICD-10-CM

## 2023-12-20 PROCEDURE — S0612 ANNUAL GYNECOLOGICAL EXAMINA: HCPCS | Performed by: OBSTETRICS & GYNECOLOGY

## 2024-02-09 ENCOUNTER — PROCEDURE VISIT (OUTPATIENT)
Dept: NEUROLOGY | Facility: CLINIC | Age: 29
End: 2024-02-09
Payer: COMMERCIAL

## 2024-02-09 VITALS — SYSTOLIC BLOOD PRESSURE: 118 MMHG | HEART RATE: 71 BPM | DIASTOLIC BLOOD PRESSURE: 74 MMHG | TEMPERATURE: 97.9 F

## 2024-02-09 DIAGNOSIS — G43.709 CHRONIC MIGRAINE WITHOUT AURA WITHOUT STATUS MIGRAINOSUS, NOT INTRACTABLE: Primary | ICD-10-CM

## 2024-02-09 PROCEDURE — 64615 CHEMODENERV MUSC MIGRAINE: CPT | Performed by: PHYSICIAN ASSISTANT

## 2024-02-09 NOTE — PROGRESS NOTES
"Universal Protocol   Consent: Verbal consent obtained. Written consent obtained.  Risks and benefits: risks, benefits and alternatives were discussed  Consent given by: patient  Time out: Immediately prior to procedure a \"time out\" was called to verify the correct patient, procedure, equipment, support staff and site/side marked as required.  Patient understanding: patient states understanding of the procedure being performed  Patient consent: the patient's understanding of the procedure matches consent given  Procedure consent: procedure consent matches procedure scheduled  Relevant documents: relevant documents present and verified  Patient identity confirmed: verbally with patient      Chemodenervation     Date/Time  2/9/2024 11:15 AM     Performed by  Courtney Saravia PA-C   Authorized by  Courtney Saravia PA-C     Pre-procedure details      Prepped With: Alcohol     Anesthesia  (see MAR for exact dosages):     Anesthesia method:  None   Procedure details      Position:  Upright   Botox      Botox Type:  Type A    Brand:  Botox    mL's of Botulinum Toxin:  200    Final Concentration per CC:  50 units    Needle Gauge:  30 G 2.5 inch   Procedures      Botox Procedures: chronic headache      Indications: migraines     Injection Location      Head / Face:  L superior cervical paraspinal, R superior cervical paraspinal, L , R , L frontalis, R frontalis, L medial occipitalis, R medial occipitalis, procerus, R temporalis, L temporalis, R superior trapezius and L superior trapezius    L  injection amount:  5 unit(s)    R  injection amount:  5 unit(s)    L lateral frontalis:  5 unit(s)    R lateral frontalis:  5 unit(s)    L medial frontalis:  5 unit(s)    R medial frontalis:  5 unit(s)    L temporalis injection amount:  20 unit(s)    R temporalis injection amount:  20 unit(s)    Procerus injection amount:  5 unit(s)    L medial occipitalis injection amount:  15 unit(s)    R medial " occipitalis injection amount:  15 unit(s)    L superior cervical paraspinal injection amount:  10 unit(s)    R superior cervical paraspinal injection amount:  10 unit(s)    L superior trapezius injection amount:  15 unit(s)    R superior trapezius injection amount:  15 unit(s)   Total Units      Total units used:  200    Total units discarded:  0   Post-procedure details      Chemodenervation:  Chronic migraine    Facial Nerve Location::  Bilateral facial nerve    Patient tolerance of procedure:  Tolerated well, no immediate complications   Comments       45 units frontalis  All medically necessary

## 2024-04-26 ENCOUNTER — TELEPHONE (OUTPATIENT)
Dept: NEUROLOGY | Facility: CLINIC | Age: 29
End: 2024-04-26

## 2024-04-26 NOTE — TELEPHONE ENCOUNTER
Pt current auth on file has  2024. New PA sent to [facility].    Will await approval/denial determination.

## 2024-05-06 NOTE — TELEPHONE ENCOUNTER
Approved   Botox 200 UNITS  Qty.1  Auth# [id number]  Valid:4/26/2024-4/26/2025  Visits: 4    Please use Stock

## 2024-05-09 ENCOUNTER — APPOINTMENT (OUTPATIENT)
Dept: LAB | Facility: CLINIC | Age: 29
End: 2024-05-09

## 2024-05-09 DIAGNOSIS — Z00.8 HEALTH EXAMINATION IN POPULATION SURVEY: ICD-10-CM

## 2024-05-09 LAB
CHOLEST SERPL-MCNC: 184 MG/DL
EST. AVERAGE GLUCOSE BLD GHB EST-MCNC: 100 MG/DL
HBA1C MFR BLD: 5.1 %
HDLC SERPL-MCNC: 76 MG/DL
LDLC SERPL CALC-MCNC: 97 MG/DL (ref 0–100)
NONHDLC SERPL-MCNC: 108 MG/DL
TRIGL SERPL-MCNC: 55 MG/DL

## 2024-05-09 PROCEDURE — 36415 COLL VENOUS BLD VENIPUNCTURE: CPT

## 2024-05-09 PROCEDURE — 80061 LIPID PANEL: CPT

## 2024-05-09 PROCEDURE — 83036 HEMOGLOBIN GLYCOSYLATED A1C: CPT

## 2024-05-10 ENCOUNTER — PROCEDURE VISIT (OUTPATIENT)
Dept: NEUROLOGY | Facility: CLINIC | Age: 29
End: 2024-05-10
Payer: COMMERCIAL

## 2024-05-10 ENCOUNTER — OFFICE VISIT (OUTPATIENT)
Dept: FAMILY MEDICINE CLINIC | Facility: CLINIC | Age: 29
End: 2024-05-10

## 2024-05-10 VITALS
SYSTOLIC BLOOD PRESSURE: 112 MMHG | OXYGEN SATURATION: 98 % | RESPIRATION RATE: 16 BRPM | BODY MASS INDEX: 24.66 KG/M2 | HEART RATE: 89 BPM | HEIGHT: 65 IN | WEIGHT: 148 LBS | DIASTOLIC BLOOD PRESSURE: 70 MMHG

## 2024-05-10 VITALS — DIASTOLIC BLOOD PRESSURE: 69 MMHG | HEART RATE: 70 BPM | TEMPERATURE: 98.6 F | SYSTOLIC BLOOD PRESSURE: 127 MMHG

## 2024-05-10 DIAGNOSIS — Z00.00 ANNUAL PHYSICAL EXAM: ICD-10-CM

## 2024-05-10 DIAGNOSIS — Z00.00 ENCOUNTER FOR SCREENING AND PREVENTATIVE CARE: Primary | ICD-10-CM

## 2024-05-10 DIAGNOSIS — G43.709 CHRONIC MIGRAINE WITHOUT AURA WITHOUT STATUS MIGRAINOSUS, NOT INTRACTABLE: ICD-10-CM

## 2024-05-10 DIAGNOSIS — G43.709 CHRONIC MIGRAINE WITHOUT AURA WITHOUT STATUS MIGRAINOSUS, NOT INTRACTABLE: Primary | ICD-10-CM

## 2024-05-10 PROCEDURE — 64615 CHEMODENERV MUSC MIGRAINE: CPT | Performed by: PHYSICIAN ASSISTANT

## 2024-05-10 RX ORDER — OMEGA-3-ACID ETHYL ESTERS 1 G/1
2 CAPSULE, LIQUID FILLED ORAL 2 TIMES DAILY
COMMUNITY

## 2024-05-10 NOTE — PROGRESS NOTES
"Universal Protocol   Consent: Verbal consent obtained. Written consent obtained.  Risks and benefits: risks, benefits and alternatives were discussed  Consent given by: patient  Time out: Immediately prior to procedure a \"time out\" was called to verify the correct patient, procedure, equipment, support staff and site/side marked as required.  Patient understanding: patient states understanding of the procedure being performed  Patient consent: the patient's understanding of the procedure matches consent given  Procedure consent: procedure consent matches procedure scheduled  Relevant documents: relevant documents present and verified  Patient identity confirmed: verbally with patient      Chemodenervation     Date/Time  5/10/2024 11:15 AM     Performed by  Courtnye Saravia PA-C   Authorized by  Courtney Saravia PA-C     Pre-procedure details      Prepped With: Alcohol     Anesthesia  (see MAR for exact dosages):     Anesthesia method:  None   Procedure details      Position:  Upright   Botox      Botox Type:  Type A    Brand:  Botox    mL's of Botulinum Toxin:  200    Final Concentration per CC:  50 units    Needle Gauge:  30 G 2.5 inch   Procedures      Botox Procedures: chronic headache      Indications: migraines     Injection Location      Head / Face:  L superior cervical paraspinal, R superior cervical paraspinal, L , R , L frontalis, R frontalis, L medial occipitalis, R medial occipitalis, procerus, R temporalis, L temporalis, R superior trapezius and L superior trapezius    L  injection amount:  5 unit(s)    R  injection amount:  5 unit(s)    L lateral frontalis:  5 unit(s)    R lateral frontalis:  5 unit(s)    L medial frontalis:  5 unit(s)    R medial frontalis:  5 unit(s)    L temporalis injection amount:  20 unit(s)    R temporalis injection amount:  20 unit(s)    Procerus injection amount:  5 unit(s)    L medial occipitalis injection amount:  15 unit(s)    R medial " occipitalis injection amount:  15 unit(s)    L superior cervical paraspinal injection amount:  10 unit(s)    R superior cervical paraspinal injection amount:  10 unit(s)    L superior trapezius injection amount:  15 unit(s)    R superior trapezius injection amount:  15 unit(s)   Total Units      Total units used:  200    Total units discarded:  0   Post-procedure details      Chemodenervation:  Chronic migraine    Facial Nerve Location::  Bilateral facial nerve    Patient tolerance of procedure:  Tolerated well, no immediate complications   Comments        45 units frontalis  All medically necessary

## 2024-05-10 NOTE — PROGRESS NOTES
New Patient Outpatient Note    HPI:     Tracie Liz, 28 y.o. female  presents today as a new patient and to establish care.  Patient is looking to establish with a new primary care provider, she has not had a family physician in some time.  She does have a significant history of migraine/headaches and receives Botox every 3 months.  Previously she was on an autoinjector monthly, but she recently had a little 1 about 9 months ago where she had to get off of this.  Currently she is still breast-feeding.  She is on omega-3 fatty acids, multivitamin, and intermittent Colace.    Family Hx  Up-to-date in chart    Past Medical History:   Diagnosis Date    Headache(784.0) 2013    Migraine         Past Surgical History:   Procedure Laterality Date    ANKLE SURGERY      FRACTURE SURGERY  2008    Ankle    WISDOM TOOTH EXTRACTION            Current Outpatient Medications:     docusate sodium (Colace) 100 mg capsule, Take 100 mg by mouth 2 (two) times a day, Disp: , Rfl:     omega-3-acid ethyl esters (LOVAZA) 1 g capsule, Take 2 g by mouth 2 (two) times a day, Disp: , Rfl:     Prenatal MV-Min-Fe Fum-FA-DHA (PRENATAL+DHA PO), Take by mouth, Disp: , Rfl:   No current facility-administered medications for this visit.     SOCIAL:   Rent/Own:  Own  Currently living with: Son,   Stable food: Yes  Safe At Home: Yes  Hobbies:  Golfing, walking, friends  Profession/ employment: Physical therapist  Restriction to medical procedures: None    SEXUAL HISTORY:   Preference:  Men  Sexually Active:  Yes  Birth Control:  Male condom    Psychological History  Psychiatric history: bout of anxiety 6 years ago, nothing recently  History of inpatient:  None  Current Therapy/ Provider:  none  Current Medications:  None    Substance History  Smoking: None  Alcohol Use: once a month 2-3 alcoholic beverages  Substance use:  None      ROS:     Review of Systems   Constitutional:  Negative for chills, fever and unexpected weight change.    HENT:  Negative for congestion, ear discharge, ear pain, hearing loss, postnasal drip, rhinorrhea, sinus pressure, sinus pain and sore throat.    Eyes:  Negative for visual disturbance (light prescription, not being used).   Respiratory:  Positive for cough (minimal,lingering from recent illness). Negative for chest tightness and shortness of breath.    Cardiovascular:  Negative for chest pain and palpitations.   Gastrointestinal:  Negative for abdominal pain, blood in stool, constipation, diarrhea, nausea and vomiting.   Genitourinary:  Negative for dysuria and frequency.   Skin:  Negative for rash and wound.   Neurological:  Positive for headaches (migraines). Negative for dizziness and light-headedness.   Psychiatric/Behavioral:  Negative for self-injury and suicidal ideas.       OBJECTIVE  Vitals:    05/10/24 1354   BP: 112/70   Pulse: 89   Resp: 16   SpO2: 98%      Physical Exam  Constitutional:       General: She is not in acute distress.     Appearance: Normal appearance. She is normal weight. She is not ill-appearing, toxic-appearing or diaphoretic.   HENT:      Head: Normocephalic and atraumatic.      Right Ear: Tympanic membrane, ear canal and external ear normal. There is no impacted cerumen.      Left Ear: Tympanic membrane, ear canal and external ear normal. There is no impacted cerumen.      Nose: Nose normal. No congestion or rhinorrhea.      Mouth/Throat:      Mouth: Mucous membranes are moist.      Pharynx: Oropharynx is clear. No oropharyngeal exudate or posterior oropharyngeal erythema.   Eyes:      General:         Right eye: No discharge.         Left eye: No discharge.      Extraocular Movements: Extraocular movements intact.      Pupils: Pupils are equal, round, and reactive to light.   Cardiovascular:      Rate and Rhythm: Normal rate and regular rhythm.      Heart sounds: Normal heart sounds. No murmur heard.     No friction rub. No gallop.   Pulmonary:      Effort: Pulmonary effort is  normal. No respiratory distress.      Breath sounds: Normal breath sounds. No stridor. No wheezing, rhonchi or rales.   Abdominal:      General: Bowel sounds are normal. There is no distension.      Palpations: Abdomen is soft.      Tenderness: There is no abdominal tenderness.   Musculoskeletal:      Cervical back: Neck supple. No tenderness.   Lymphadenopathy:      Cervical: No cervical adenopathy.   Skin:     General: Skin is warm.   Neurological:      Mental Status: She is alert.      Sensory: No sensory deficit (Light touch present in all 4 extremities).      Motor: No weakness (5/5 in all 4 extremities).      Deep Tendon Reflexes: Reflexes normal (2/4, intact, C5, C6, L4, S1).        ASSESSMENT AND PLAN   Tracie was seen today for establish care.  Diagnoses and all orders for this visit:    Encounter for screening and preventative care  Annual physical exam  BMI 24.0-24.9, adult  Overall patient is doing well.  Her chronic medical issue of migraines is well-controlled through neurology and Botox injections.  She presents today for her annual physical.  She has completed her healthy weight blood work including A1c and cholesterol.  We reviewed possible additional labs, her previous abnormals were during pregnancy or after her child was born.  She is not having any major concerns or symptoms, therefore no further labs were placed.  May want to consider follow-up labs at next year's physical, will place orders at the time of assessment.    Chronic migraine without aura without status migrainosus, not intractable  Stable.  Patient currently obtaining Botox from neurology.  Appreciate recommendations and treatments through specialty.             DO Court Knight [facility]  5/10/2024 2:32 PM

## 2024-06-17 ENCOUNTER — TELEPHONE (OUTPATIENT)
Dept: FAMILY MEDICINE CLINIC | Facility: CLINIC | Age: 29
End: 2024-06-17

## 2024-06-17 NOTE — TELEPHONE ENCOUNTER
Pt came in stating she is having lumbar issues which is causing her to not have feeling in her right big toe/weakness - was asking if she could get a steroid

## 2024-06-18 ENCOUNTER — OFFICE VISIT (OUTPATIENT)
Dept: FAMILY MEDICINE CLINIC | Facility: CLINIC | Age: 29
End: 2024-06-18
Payer: COMMERCIAL

## 2024-06-18 VITALS
SYSTOLIC BLOOD PRESSURE: 110 MMHG | HEART RATE: 62 BPM | OXYGEN SATURATION: 100 % | BODY MASS INDEX: 24.66 KG/M2 | WEIGHT: 148 LBS | DIASTOLIC BLOOD PRESSURE: 60 MMHG | HEIGHT: 65 IN

## 2024-06-18 DIAGNOSIS — M54.17 LUMBOSACRAL RADICULOPATHY AT L5: ICD-10-CM

## 2024-06-18 DIAGNOSIS — R20.2 NUMBNESS AND TINGLING: ICD-10-CM

## 2024-06-18 DIAGNOSIS — M54.16 LUMBAR RADICULOPATHY: Primary | ICD-10-CM

## 2024-06-18 DIAGNOSIS — R20.0 NUMBNESS AND TINGLING: ICD-10-CM

## 2024-06-18 PROCEDURE — 99213 OFFICE O/P EST LOW 20 MIN: CPT | Performed by: FAMILY MEDICINE

## 2024-06-18 RX ORDER — PREDNISONE 20 MG/1
40 TABLET ORAL DAILY
Qty: 10 TABLET | Refills: 0 | Status: SHIPPED | OUTPATIENT
Start: 2024-06-18 | End: 2024-06-23

## 2024-06-18 NOTE — PROGRESS NOTES
Outpatient Note- Follow up     HPI:     Tracie Liz , 28 y.o. female  presents today for right hallux numbness and tingling and decreased sensation across dorsum of right foot.  Patient noticed symptoms starting about 1 month ago.  The turn for the patient is that it has not improved despite the last 4 weeks.  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    Onset of foot pain/numbness:  1 month ago  Intensity of pain:  2/10, annoying, numbness  Location of Pain:  medial edge of right large toe  Radiation:  across the dorsum and onto the lateral aspect of leg  Character of Pain:  numbness, tingling, altered sensation  Constant intermittent: constant  Exacerbating factors:  sitting, poor posture, lumbar flexion  Relieving Factors: extension of lumbar  Associated Symptoms:    + Numbness, tingling  -  Nausea, vomiting  -  Decreased ROM  +  Decreased Strength  -  Red Flag Symptoms-  Numbness and tingling to Genitals/perineum,   increased weakness of LE, Loss of bowel or bladder function.     Inciting Event/ Trauma:  nursing, spending more time in flexion, lifting child  Progression: progressively worse  Previous Episodes: None  Specialist Follow up: None  Hx of Physical Therapy:  currently saw PT and did evaluation  Prior medications: None    Past Medical History:   Diagnosis Date    Headache(784.0) 2013    Migraine       ROS:   Review of Systems   Denies any other systemic symptoms or chest pain, shortness of breath, nausea, vomiting, lightheadedness, dizziness.    OBJECTIVE  Vitals:    06/18/24 0943   BP: 110/60   Pulse: 62   SpO2: 100%        Physical Exam  Musculoskeletal:         General: No swelling or tenderness. Normal range of motion.   Neurological:      Sensory: Sensory deficit (increased numbness and tingling at distal tip of hallax on right side) present.      Motor: Weakness (mild decrease in weakness to the right large  toe, minimal change.) present.      Gait: Gait normal.      Deep Tendon Reflexes: Reflexes normal (S1 and L4 notmal, 2/4 bilaterally.).      Comments: Filament examination of bottom of foot revealed normal sensation outside of the tip of the right hallux and across the dorsum of right foot.             ASSESSMENT AND PLAN   Tracie was seen today for foot pain.    Diagnoses and all orders for this visit:    Lumbar radiculopathy  Lumbosacral radiculopathy at L5  Numbness and tingling  Patient has numbness and tingling of right hallux and over the dorsum of the foot.  This is likely following at the L5 dermatome.  Since it may be coming from the back or an area along the nerve root, will treat with prednisone.  This is an L2 category 4 breast-feeding.  Patient continues to breast-feed her 10-month-old.  Therefore I feel comfortable prescribing medication.  She is to take 40 mg in the next 5 days, possible side effects of medication were reviewed and discussed prior to end of visit.  She is to reach out to me if after 5 days she has not significantly improved/numbness and tingling resolved.  We will likely need to then perform x-ray of the lumbar spine and consider other testing such as iron, B12, folate, CBC, magnesium, and electrolytes.  -     predniSONE 20 mg tablet; Take 2 tablets (40 mg total) by mouth daily for 5 days      DO Court [facility]  6/18/2024 10:12 AM

## 2024-06-25 ENCOUNTER — APPOINTMENT (OUTPATIENT)
Dept: LAB | Facility: CLINIC | Age: 29
End: 2024-06-25
Payer: COMMERCIAL

## 2024-06-25 DIAGNOSIS — R20.0 NUMBNESS AND TINGLING: ICD-10-CM

## 2024-06-25 DIAGNOSIS — R20.0 NUMBNESS AND TINGLING: Primary | ICD-10-CM

## 2024-06-25 DIAGNOSIS — R29.898 WEAKNESS OF BOTH LOWER EXTREMITIES: ICD-10-CM

## 2024-06-25 DIAGNOSIS — R20.2 NUMBNESS AND TINGLING: ICD-10-CM

## 2024-06-25 DIAGNOSIS — R20.2 NUMBNESS AND TINGLING: Primary | ICD-10-CM

## 2024-06-25 LAB
ALBUMIN SERPL BCG-MCNC: 4.7 G/DL (ref 3.5–5)
ALP SERPL-CCNC: 70 U/L (ref 34–104)
ALT SERPL W P-5'-P-CCNC: 17 U/L (ref 7–52)
ANION GAP SERPL CALCULATED.3IONS-SCNC: 6 MMOL/L (ref 4–13)
AST SERPL W P-5'-P-CCNC: 16 U/L (ref 13–39)
BILIRUB SERPL-MCNC: 1.21 MG/DL (ref 0.2–1)
BUN SERPL-MCNC: 13 MG/DL (ref 5–25)
CALCIUM SERPL-MCNC: 10 MG/DL (ref 8.4–10.2)
CHLORIDE SERPL-SCNC: 101 MMOL/L (ref 96–108)
CO2 SERPL-SCNC: 29 MMOL/L (ref 21–32)
CREAT SERPL-MCNC: 0.68 MG/DL (ref 0.6–1.3)
FOLATE SERPL-MCNC: >22.3 NG/ML
GFR SERPL CREATININE-BSD FRML MDRD: 118 ML/MIN/1.73SQ M
GLUCOSE P FAST SERPL-MCNC: 87 MG/DL (ref 65–99)
MAGNESIUM SERPL-MCNC: 1.9 MG/DL (ref 1.9–2.7)
POTASSIUM SERPL-SCNC: 4.3 MMOL/L (ref 3.5–5.3)
PROT SERPL-MCNC: 7.2 G/DL (ref 6.4–8.4)
SODIUM SERPL-SCNC: 136 MMOL/L (ref 135–147)

## 2024-06-25 PROCEDURE — 80053 COMPREHEN METABOLIC PANEL: CPT

## 2024-06-25 PROCEDURE — 83735 ASSAY OF MAGNESIUM: CPT

## 2024-06-25 PROCEDURE — 83918 ORGANIC ACIDS TOTAL QUANT: CPT

## 2024-06-25 PROCEDURE — 82746 ASSAY OF FOLIC ACID SERUM: CPT

## 2024-06-25 PROCEDURE — 36415 COLL VENOUS BLD VENIPUNCTURE: CPT

## 2024-06-25 NOTE — PROGRESS NOTES
Reached out to me and stated that her symptoms have been getting worse with more constant numbness and tingling and worse numbness in the big toe.  Therefore I stated that I would place orders for labs to ensure that B12, folate, magnesium, and electrolytes were within normal limits.  If this does not reveal anything, then would likely refer to podiatry or ortho for further evaluation.

## 2024-06-26 DIAGNOSIS — R20.0 NUMBNESS AND TINGLING: Primary | ICD-10-CM

## 2024-06-26 DIAGNOSIS — R29.898 WEAKNESS OF BOTH LOWER EXTREMITIES: ICD-10-CM

## 2024-06-26 DIAGNOSIS — R20.2 NUMBNESS AND TINGLING: Primary | ICD-10-CM

## 2024-06-26 DIAGNOSIS — R20.0 NUMBNESS OF TOES: ICD-10-CM

## 2024-06-27 ENCOUNTER — NURSE TRIAGE (OUTPATIENT)
Age: 29
End: 2024-06-27

## 2024-06-27 ENCOUNTER — OFFICE VISIT (OUTPATIENT)
Dept: NEUROLOGY | Facility: CLINIC | Age: 29
End: 2024-06-27
Payer: COMMERCIAL

## 2024-06-27 VITALS
DIASTOLIC BLOOD PRESSURE: 70 MMHG | TEMPERATURE: 98.5 F | HEIGHT: 65 IN | BODY MASS INDEX: 24.66 KG/M2 | WEIGHT: 148 LBS | SYSTOLIC BLOOD PRESSURE: 124 MMHG | HEART RATE: 118 BPM | OXYGEN SATURATION: 99 %

## 2024-06-27 DIAGNOSIS — R29.2 HYPERREFLEXIA: ICD-10-CM

## 2024-06-27 DIAGNOSIS — R20.0 NUMBNESS AND TINGLING OF BOTH LOWER EXTREMITIES: ICD-10-CM

## 2024-06-27 DIAGNOSIS — R41.89 BRAIN FOG: ICD-10-CM

## 2024-06-27 DIAGNOSIS — R20.0 NUMBNESS AND TINGLING OF BOTH UPPER EXTREMITIES: Primary | ICD-10-CM

## 2024-06-27 DIAGNOSIS — G35 MULTIPLE SCLEROSIS (HCC): ICD-10-CM

## 2024-06-27 DIAGNOSIS — R20.2 NUMBNESS AND TINGLING OF BOTH LOWER EXTREMITIES: ICD-10-CM

## 2024-06-27 DIAGNOSIS — R20.2 NUMBNESS AND TINGLING OF BOTH UPPER EXTREMITIES: Primary | ICD-10-CM

## 2024-06-27 PROCEDURE — 99215 OFFICE O/P EST HI 40 MIN: CPT | Performed by: PSYCHIATRY & NEUROLOGY

## 2024-06-27 NOTE — TELEPHONE ENCOUNTER
"Pt call reporting new symptoms not related to migraines. PCP suggested to f/u w/ neurologist. Complaining of numbness in her big toe that started 2 months ago, tingling bottom of her feet started a couple days ago and tingling on both arms started yesterday. Her PCP ordered prednisone 40 mg daily (5 day course). PCP instructed her to f/u w/ her neurologist. Pt has upcoming appt w/ Dr Decker in Oct. Requesting sooner appt. Advised pt no sooner appt avail w/ Dr Decker. Pt states that she is willing to see any provider. Wed and Fridays works for her but if not availability, she can make it work. Preferred location [place], [place] or     Reason for Disposition   Nursing judgment    Answer Assessment - Initial Assessment Questions  1. REASON FOR CALL:numbness in her big toe that started 2 months ago, tingling bottom of her feet started a couple days ago and tingling on both arms started yesterday.         2. ONSET: numbness in her big toe that started 2 months ago, tingling bottom of her feet started a couple days ago and tingling on both arms started yesterday.           3. SEVERITY: moderate. Symptoms comes and goes throughout the day but enough to notice it.     4. FEVER: \"Do you have a fever?\"      Denies     5. OTHER SYMPTOMS:      Lightheadedness this morning, lasted about an hour then it went away. Feels off .     6. INTERVENTIONS AND RESPONSE: \"What have you done so far to try to make this better? What medications have you used?\" PCP prescribed prednisone. She completed the 5 day course        7. PREGNANCY: \"Is there any chance you are pregnant?\"      Denies    Protocols used: No Protocol Available-ADULT-[place]      "

## 2024-06-27 NOTE — TELEPHONE ENCOUNTER
I just spoke with Dr. Shukla who said that she could see her in person in [place] today if someone can help please?

## 2024-06-27 NOTE — PROGRESS NOTES
[facility] General Neurology Consult  PATIENT:  Tracie Liz (: 1995)  MRN:  [id number]  DATE OF SERVICE:  2024    REFERRED BY: No ref. provider found  PMD: Toi Ventura,     Assessment/Plan:     Tracie Liz is a very pleasant right-handed 28 y.o. 10 months postpartum female with history of chronic migraines and who presents today for evaluation of paresthesias    Patient reports several months of numbness at the distal tip of her right hallux that started several months ago.  Approximately 1 month ago she developed transient pins and needle sensation along the medial plantar surface that was most notable during rest.  Despite stretching and doing physical therapy exercises on her back her symptoms persisted.  She visited her PCP who placed her on prednisone 40 mg x 5 days.  This did not help improve her symptoms.  Yesterday patient developed radiating paresthesias down the ventral portion of bilateral upper extremities left worse than right. This morning patient woke up feeling lightheaded.  She felt like this was made worse by looking up.  She denies any inciting events that preceded symptom onset.    On review of systems patient reports bilateral blurry vision at far site.  She reports that her hands feel slightly weak and brain fog that has been slightly impacting her at work.  She denies problems with balance, falls, urinary symptoms, Lhermitte's phenomenon, extraneous fatigue or difficulty driving.  On physical exam patient with no cranial nerve deficits appreciated.  Absent red desaturation and absent APD.  She had very mild weakness at both proximal/distal flexor and extensors of the right upper extremity.  Bilateral Enriqueta sign was positive.  She had very mild weakness at the left hip flexor.  Patient had symmetric brisk reflexes in bilateral patellar and Achilles tendons.  She had diminished reflexes of the right upper extremity biceps, triceps and brachioradialis  in comparison to the left upper extremity.  Sensation was normal all throughout with exception of decreased pinprick and temperature isolated to the most distal medial aspect of the right hallux.  Patient with normal coordination.  Romberg negative.  Gait normal including tandem gait.    Impression: At this time, given patient's new onset neurological symptoms starting during her postpartum period I would like to evaluate her for multiple sclerosis.  Flareups do tend to occur very commonly postpartum. There is no recent imaging other than her 2014 MRI which I personally reviewed images and it did not show any evidence of early demyelination.  During the interval past 10 years she could have developed a silent lesion.     Plan:  MRI brain MS protocol with and without contrast  MRI C-spine with and without contrast  Will order blood work: Vitamin D, B6, B12, Lyme, ESR and CRP  SINTIA given her reported history of dry eyes to evaluate for Sjogren's as well as other autoimmune/inflammatory diseases  MRI orders placed as stat  Pending findings will decide on IV Solu-Medrol as per [facility] protocol, ideally outpatient   If workup negative may need to pursue an EMG study  MRI a lumbar spine can be considered however this would not explain her upper extremity symptoms  I would like to see patient in 1 week.  Okay to schedule during 8 AM slot during office hours    History of Present Illness:   Tracie Liz is a very pleasant right-handed 28 y.o. 10 months postpartum female with history of chronic migraines and who presents today for evaluation of paresthesias    Initial presenting symptom:   Patient reports that she has numbness of the distal tip of her right hallux that started several months ago.  Patient let it go for some time however symptoms continued to progress approximately 1 month ago with a transient pins and needle sensation along the medial plantar surface that was most notable during rest.  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    1 week ago her symptoms continued to progress.  She did not experience any improvement.   Yesterday she started experiencing radiating paresthesias of bilateral ventral arms, left worse than right.  This morning patient woke up feeling lightheaded.  She felt like this was made worse by looking up.    Any changes in medication, medical history or major life stressors associated with symptom onset?  No inciting event that she can directly identified.  10-1/2 months ago she delivered a baby boy.  3 months ago she went to the eye doctor after experiencing dry eyes and started taking fish oil which helped resolve symptoms    Associated symptoms:    Visual disturbances: Both eyes blurry in far sight   Weakness: Hands feel a little weak   Balance issues: No   Frequent falls:No   Urinary symptoms: No   Uhthoffs phenomenon: No   Lhermittes phenomenon: No    Sensory disturbances: Yes see above  Fatigue: No more than usual   Cognitive disturbances: Brain fog   Difficulty driving: No     Workup:  Have you seen someone else for your symptoms? PCP   Have you ever had any imaging or diagnostic workup? Yes, in 2014, MRI WNL   Vitamin D: No recent vitamin D available   Lyme: No hx of lymes, has had tick bites, No STI       Prior treatment:    Steroids: Prednisone 40 mg X 5 days PO     Lifestyle:    How do these symptoms impact ADLs? Feels like its affecting her work   Number of days missed per month because of these symptoms  Work (or school) days: No      Social:  Physical activity: Walks 3 miles a day, core strengthening   Tick bites: Yes   Water: 30 oz per day  Caffeine: 1 cup in the am  per day  Mood:  history of anxiety but is transient.   ETOH: Socially   Tobacco:  No   Illicit drugs:  No   Work: [employer]   Education: DPT with ortho specialization   Lives with:  and son   Kids: 10 Mo old     Sleep:  averages: 8 hours  Problems falling asleep?:    No  Problems staying asleep?:  Interrupted,  new born   Do you snore while asleep?No  Feels well rested     Family history:  Family history of neurological disease: Alzheimer's dementia in maternal and paternal side. PD.     Medication considerations:  History of kidney stones?: No  History of abnormal renal function? No   History of low blood pressure or cardiac arrythmias?: No   Difficulty sleeping? No   Plan for pregnancy? Yes, maybe in a year, not on birth control     The following portions of the patient's history were reviewed and updated as appropriate: allergies, current medications, past family history, past medical history, past social history, past surgical history and problem list.    Past Medical History:     Past Medical History:   Diagnosis Date   • Headache(784.0) 2013   • Migraine        Patient Active Problem List   Diagnosis   • Chronic migraine without aura without status migrainosus, not intractable   • Numbness and tingling of both upper extremities   • Numbness and tingling of both lower extremities   • Brain fog       Medications:      Current Outpatient Medications   Medication Sig Dispense Refill   • docusate sodium (Colace) 100 mg capsule Take 100 mg by mouth 2 (two) times a day     • omega-3-acid ethyl esters (LOVAZA) 1 g capsule Take 2 g by mouth 2 (two) times a day     • Prenatal MV-Min-Fe Fum-FA-DHA (PRENATAL+DHA PO) Take by mouth       No current facility-administered medications for this visit.        Allergies:      Allergies   Allergen Reactions   • Lactose Intolerance (Gi) - Food Allergy GI Intolerance       Family History:     Family History   Problem Relation Age of Onset   • Arrhythmia Mother    • Hyperlipidemia Father    • No Known Problems Sister    • No Known Problems Maternal Grandmother    • Alzheimer's disease Maternal Grandfather    • Breast cancer Paternal Grandmother    • Dementia Paternal Grandfather    • Colon cancer Maternal Uncle    • Uterine cancer Neg Hx    •  "Ovarian cancer Neg Hx    • Cervical cancer Neg Hx        Social History:       Social History     Socioeconomic History   • Marital status: /Civil Union     Spouse name: Not on file   • Number of children: Not on file   • Years of education: Not on file   • Highest education level: Not on file   Occupational History   • Not on file   Tobacco Use   • Smoking status: Never   • Smokeless tobacco: Never   Vaping Use   • Vaping status: Never Used   Substance and Sexual Activity   • Alcohol use: Yes     Comment: once a month 2-3 beers/drinks   • Drug use: No   • Sexual activity: Yes     Partners: Male     Birth control/protection: Condom Male   Other Topics Concern   • Not on file   Social History Narrative   • Not on file     Social Determinants of Health     Financial Resource Strain: Not on file   Food Insecurity: Not on file   Transportation Needs: Not on file   Physical Activity: Not on file   Stress: Not on file   Social Connections: Unknown (6/18/2024)    Received from [facility]    • How often do you feel lonely or isolated from those around you? (Adult - for ages 18 years and over): Not on file   Intimate Partner Violence: Not on file   Housing Stability: Not on file         Objective:   Blood pressure 124/70, pulse (!) 118, temperature 98.5 °F (36.9 °C), temperature source Temporal, height 5' 5\" (1.651 m), weight 67.1 kg (148 lb), SpO2 99%, currently breastfeeding.    Physical Exam  Eyes:      General: Lids are normal.      Extraocular Movements: Extraocular movements intact.      Pupils: Pupils are equal, round, and reactive to light.   Neurological:      Coordination: Romberg sign negative.      Deep Tendon Reflexes:      Reflex Scores:       Tricep reflexes are 2+ on the right side and 2+ on the left side.       Bicep reflexes are 2+ on the right side and 2+ on the left side.       Brachioradialis reflexes are 2+ on the right side and 2+ on the left side.       Patellar reflexes " are 2+ on the right side and 2+ on the left side.       Achilles reflexes are 2+ on the right side and 2+ on the left side.  Psychiatric:         Speech: Speech normal.         Neurological Exam  Mental Status  Awake, alert and oriented to person, place and time. Recent and remote memory are intact. Speech is normal. Language is fluent with no aphasia. Attention and concentration are normal. Fund of knowledge is appropriate for level of education.    Cranial Nerves  CN II: Visual fields full to confrontation.  CN III, IV, VI: Extraocular movements intact bilaterally. Normal lids and orbits bilaterally. Pupils equal round and reactive to light bilaterally.  CN V: Facial sensation is normal.  CN VII: Full and symmetric facial movement.  CN VIII: Hearing is normal.  CN IX, X: Palate elevates symmetrically  CN XI: Shoulder shrug strength is normal.  CN XII: Tongue midline without atrophy or fasciculations.  Absent red desaturation  No APD appreciated.    Motor  Normal muscle bulk throughout. No fasciculations present. Normal muscle tone. No abnormal involuntary movements.                                               Right                     Left  Deltoid                                   5-                          5   Biceps                                   5-                          5  Brachioradialis                      5-                          5   Wrist flexor                            5-                          5   Wrist extensor                       5-                          5   Iliopsoas                               5                          5-   Quadriceps                           5                          5   Hamstring                             5                          5  Ankle dorsiflexor                   5                          5  Ankle plantar flexor              5                           5    Sensory  Light touch is normal in upper and lower extremities. Pinprick abnormality:  Temperature abnormality: Vibration is normal in upper and lower extremities.   Decreased pinprick and temperature sensation isolated to the most distal medial aspect of the right hallux.    Reflexes                                            Right                      Left  Brachioradialis                    2+                         2+  Biceps                                 2+                         2+  Triceps                                2+                         2+  Patellar                                2+                         2+  Achilles                                2+                         2+  Right Plantar: equivocal  Left Plantar: equivocal    Right pathological reflexes: Enriqueta's present. Ankle clonus present.  Left pathological reflexes: Enriqueta's present. Ankle clonus present.  2 beat ankle clonus present bilaterally  Brisk symmetric reflexes in the patellar's.  No cross adductor  Reflexes slightly less brisk in the right brachioradialis, biceps and triceps compared to the left.    Coordination  Right: Finger-to-nose normal.Left: Finger-to-nose normal.    Gait  Casual gait is normal including stance, stride, and arm swing. Normal tandem gait. Romberg is absent. Able to rise from chair without using arms.        Pertinent lab results: Mildly elevated bilirubin  WNL: eGFR, lipid panel, HbA1c, folate, magnesium, RPR, chlamydia/GC.     Imaging: MRI brain without contrast in 2014.  I personally reviewed the images with the patient and at that time there were no findings to suggest demyelination.         Review of Systems:     Review of Systems     Review of Systems   Constitutional:  Negative for appetite change, fatigue and fever.   HENT: Negative.  Negative for hearing loss, tinnitus, trouble swallowing and voice change.    Eyes: Negative.  Negative for photophobia, pain and visual disturbance.   Respiratory: Negative.  Negative for shortness of breath.    Cardiovascular: Negative.   Negative for palpitations.   Gastrointestinal: Negative.  Negative for nausea and vomiting.   Endocrine: Negative.  Negative for cold intolerance.   Genitourinary: Negative.  Negative for dysuria, frequency and urgency.   Musculoskeletal:  Negative for back pain, gait problem, myalgias, neck pain and neck stiffness.   Skin: Negative.  Negative for rash.   Allergic/Immunologic: Negative.    Neurological:  Positive for numbness (Tingling in hands/feet. Big toe on right foot numb.). Negative for dizziness, tremors, seizures, syncope, facial asymmetry, speech difficulty, weakness, light-headedness and headaches.   Hematological: Negative.  Does not bruise/bleed easily.   Psychiatric/Behavioral: Negative.  Negative for confusion, hallucinations and sleep disturbance.    All other systems reviewed and are negative.     I have spent 60 minutes with Patient today in which greater than 50% of this time was spent in counseling/coordination of care regarding Diagnostic results, Prognosis, Risks and benefits of tx options, Instructions for management, Patient and family education, Risk factor reductions, Impressions, Counseling / Coordination of care, Documenting in the medical record, Reviewing / ordering tests, medicine, procedures  , Obtaining or reviewing history  , and Communicating with other healthcare professionals . I also spent 30 minutes non face to face for this patient the same day.         *History obtained from patient as well as available medical record review    Author:  Jocelyne Shukla M.D. 6/27/2024 6:22 PM

## 2024-06-27 NOTE — TELEPHONE ENCOUNTER
OVL scheduled w/ Dr Shukla today at 3:30 w/ Dr. Shukla. Called pt and made aware. She verbalized understanding. Since Dr Shukla don't typically go to [facility], there was no option to change the location. It says [facility] but I told the pt to go to [facility] office.

## 2024-06-27 NOTE — TELEPHONE ENCOUNTER
Received teams message from Dr Shukla-  [10:49 AM] Jocelyne Shukla. I saw you added an office visit today at 3 :30. Office is closed. I am doing just virtuals. But paresthesias is something hard to assess without an in person exam     No open slot.     Called pt and cancelled her appt today w/ Dr Vazquez Decker, pls see below and advise.    thanks

## 2024-06-28 ENCOUNTER — APPOINTMENT (OUTPATIENT)
Dept: LAB | Facility: CLINIC | Age: 29
End: 2024-06-28
Payer: COMMERCIAL

## 2024-06-28 DIAGNOSIS — R29.2 HYPERREFLEXIA: ICD-10-CM

## 2024-06-28 DIAGNOSIS — R20.2 NUMBNESS AND TINGLING OF BOTH UPPER EXTREMITIES: ICD-10-CM

## 2024-06-28 DIAGNOSIS — G35 MULTIPLE SCLEROSIS (HCC): ICD-10-CM

## 2024-06-28 DIAGNOSIS — R20.2 NUMBNESS AND TINGLING OF BOTH LOWER EXTREMITIES: ICD-10-CM

## 2024-06-28 DIAGNOSIS — R20.0 NUMBNESS AND TINGLING OF BOTH UPPER EXTREMITIES: ICD-10-CM

## 2024-06-28 DIAGNOSIS — R20.0 NUMBNESS AND TINGLING OF BOTH LOWER EXTREMITIES: ICD-10-CM

## 2024-06-28 LAB
25(OH)D3 SERPL-MCNC: 44.2 NG/ML (ref 30–100)
ANA SER QL IA: NEGATIVE
B BURGDOR IGG+IGM SER QL IA: NEGATIVE
CRP SERPL QL: <1 MG/L
ERYTHROCYTE [SEDIMENTATION RATE] IN BLOOD: 1 MM/HOUR (ref 0–19)
VIT B12 SERPL-MCNC: 437 PG/ML (ref 180–914)

## 2024-06-28 PROCEDURE — 86618 LYME DISEASE ANTIBODY: CPT

## 2024-06-28 PROCEDURE — 36415 COLL VENOUS BLD VENIPUNCTURE: CPT

## 2024-06-28 PROCEDURE — 84207 ASSAY OF VITAMIN B-6: CPT

## 2024-06-28 PROCEDURE — 86038 ANTINUCLEAR ANTIBODIES: CPT

## 2024-06-28 PROCEDURE — 82306 VITAMIN D 25 HYDROXY: CPT

## 2024-06-28 PROCEDURE — 86140 C-REACTIVE PROTEIN: CPT

## 2024-06-28 PROCEDURE — 82607 VITAMIN B-12: CPT

## 2024-06-28 PROCEDURE — 85652 RBC SED RATE AUTOMATED: CPT

## 2024-06-30 ENCOUNTER — HOSPITAL ENCOUNTER (OUTPATIENT)
Dept: MRI IMAGING | Facility: HOSPITAL | Age: 29
Discharge: HOME | End: 2024-06-30
Attending: PSYCHIATRY & NEUROLOGY
Payer: COMMERCIAL

## 2024-06-30 DIAGNOSIS — R20.0 NUMBNESS AND TINGLING OF BOTH LOWER EXTREMITIES: ICD-10-CM

## 2024-06-30 DIAGNOSIS — R20.0 NUMBNESS AND TINGLING OF BOTH UPPER EXTREMITIES: ICD-10-CM

## 2024-06-30 DIAGNOSIS — R29.2 HYPERREFLEXIA: ICD-10-CM

## 2024-06-30 DIAGNOSIS — R20.2 NUMBNESS AND TINGLING OF BOTH LOWER EXTREMITIES: ICD-10-CM

## 2024-06-30 DIAGNOSIS — G35 MULTIPLE SCLEROSIS (HCC): ICD-10-CM

## 2024-06-30 DIAGNOSIS — R20.2 NUMBNESS AND TINGLING OF BOTH UPPER EXTREMITIES: ICD-10-CM

## 2024-06-30 PROCEDURE — 72156 MRI NECK SPINE W/O & W/DYE: CPT

## 2024-06-30 PROCEDURE — 70553 MRI BRAIN STEM W/O & W/DYE: CPT

## 2024-06-30 PROCEDURE — A9585 GADOBUTROL INJECTION: HCPCS | Performed by: PSYCHIATRY & NEUROLOGY

## 2024-06-30 RX ORDER — GADOBUTROL 604.72 MG/ML
6 INJECTION INTRAVENOUS
Status: COMPLETED | OUTPATIENT
Start: 2024-06-30 | End: 2024-06-30

## 2024-06-30 RX ADMIN — GADOBUTROL 6 ML: 604.72 INJECTION INTRAVENOUS at 14:51

## 2024-07-01 LAB — METHYLMALONATE SERPL-SCNC: 203 NMOL/L (ref 0–378)

## 2024-07-03 LAB — VIT B6 SERPL-MCNC: 17 UG/L (ref 3.4–65.2)

## 2024-07-05 ENCOUNTER — OFFICE VISIT (OUTPATIENT)
Dept: NEUROLOGY | Facility: CLINIC | Age: 29
End: 2024-07-05
Payer: COMMERCIAL

## 2024-07-05 VITALS
SYSTOLIC BLOOD PRESSURE: 116 MMHG | HEART RATE: 83 BPM | HEIGHT: 65 IN | DIASTOLIC BLOOD PRESSURE: 72 MMHG | WEIGHT: 143.4 LBS | TEMPERATURE: 98 F | OXYGEN SATURATION: 99 % | BODY MASS INDEX: 23.89 KG/M2

## 2024-07-05 DIAGNOSIS — R20.2 PARESTHESIAS: Primary | ICD-10-CM

## 2024-07-05 PROBLEM — R20.0 NUMBNESS AND TINGLING OF BOTH LOWER EXTREMITIES: Status: RESOLVED | Noted: 2024-06-27 | Resolved: 2024-07-05

## 2024-07-05 PROBLEM — R20.0 NUMBNESS AND TINGLING OF BOTH UPPER EXTREMITIES: Status: RESOLVED | Noted: 2024-06-27 | Resolved: 2024-07-05

## 2024-07-05 PROCEDURE — 99213 OFFICE O/P EST LOW 20 MIN: CPT | Performed by: PSYCHIATRY & NEUROLOGY

## 2024-07-05 NOTE — ASSESSMENT & PLAN NOTE
Tracie Liz is a very pleasant right-handed 28 y.o. 10 months postpartum female with history of chronic migraines and who presents today for follow-up of paresthesias.  I last saw her 1 week ago.     To review initial visit, patient reports several months of numbness at the distal tip of her right hallux that started several months ago.  Approximately 1 month prior she developed transient pins and needle sensation along the medial plantar surface that was most notable during rest.  Despite stretching and doing physical therapy exercises on her back her symptoms persisted.  She visited her PCP who placed her on prednisone 40 mg x 5 days without improvement.  The day prior to our initial visit she developed radiating paresthesias down the ventral portion of bilateral upper extremities left worse than right.  The morning of our visit patient woke up feeling lightheaded.  She felt like this was made worse by looking up.  She denies any inciting events that preceded symptom onset. On review of systems patient reported bilateral blurry vision at far site.  She reports that her hands feel slightly weak and brain fog that has been slightly impacting her at work.  She denies problems with balance, falls, urinary symptoms, Lhermitte's phenomenon, extraneous fatigue or difficulty driving.      On physical exam patient with no cranial nerve deficits appreciated.  Absent red desaturation and absent APD.  She had very mild weakness at both proximal/distal flexor and extensors of the right upper extremity.  Bilateral Enriqueta sign was positive.  She had very mild weakness at the left hip flexor.  Patient had symmetric brisk reflexes in bilateral patellar and Achilles tendons.  She had diminished reflexes of the right upper extremity biceps, triceps and brachioradialis in comparison to the left upper extremity.  Sensation was normal all throughout with exception of decreased pinprick and temperature isolated to the most  distal medial aspect of the right hallux.  Patient with normal coordination.  Romberg negative.  Gait normal including tandem gait. I ordered a stat MRI brain MS protocol and C-spine with and without contrast to rule out multiple sclerosis.  I ordered a vitamin D, B6, B12, Lyme, ESR and CRP as well as an SINTIA.  Results were close to unremarkable.  She had a central protrusion type disc herniation at C6-7 which did not result in any cord or nerve impingement.  All lab work was unremarkable.  B12 was 437 which was on the moderate and    Today she reports that the pins and needle sensation along the medial plantar surface of bilateral feet improved.  The numbness at the tip of her right big toe still continues to occur.  It is relieved upon rest and is made worse upon standing up.  She denies any progression of any symptoms.  Her brain fog still persist however she currently is weaning off breast-feeding.  On physical exam today her reflexes were 1+ at the right biceps and brachioradialis and left biceps.  Left brachioradialis was normal.  Uncertain of the significance of this but likely benign in the absence of radicular symptoms or weakness.     At this time, there is no evidence of multiple sclerosis on imaging.  Blood work without any evidence to suggest an autoimmune/inflammatory disease.  Her vitamin B12 was on the moderate end at 437.  I recommend she supplement with 500 mcg daily.  She is going to look at her prenatal vitamin to see how much daily dosage of B12 she is receiving.  She should follow-up levels with her PCP.  Otherwise, we ruled out her main concern.  Her symptoms could be due to ongoing postpartum hormonal changes.  I had a discussion with patient about having a healthy diet, sleeping well, self-care and making time for herself.  I encouraged her to restart strength training given that our body changes after giving birth.  I will follow-up with her in 6 months just to see how she is doing.  She was  made aware that if her symptoms become progressive or if she develops any new symptoms to let me know sooner so we can reevaluate.  In the future we can consider an MRI lumbar spine if her lower extremity symptoms worsen versus an EMG

## 2024-07-05 NOTE — PATIENT INSTRUCTIONS
A low-carbohydrate diet typically involves reducing the intake of foods rich in carbohydrates, such as bread, pasta, rice, and sugary snacks. Instead, it emphasizes foods that are high in protein, healthy fats, and non-starchy vegetables. Here's a basic outline of what a low-carb diet might include:    Protein: Include sources like poultry, fish, eggs, tofu, and lean cuts of meat. These provide essential amino acids for muscle repair and overall body function. Prefer white meats over red meats, as red meatscan promote inflammation   Healthy Fats: Incorporate sources like avocados, nuts, seeds, olive oil, and fatty fish (like salmon and mackerel). These fats are crucial for brain health and hormone regulation.  Non-Starchy Vegetables: Load up on leafy greens, broccoli, cauliflower, zucchini, bell peppers, and other low-carb vegetables. They're high in fiber, vitamins, and minerals but low in carbs.  Limited Fruits: While fruits are generally healthy, some are high in carbs. Opt for berries (like strawberries, blueberries, and raspberries) in moderation, as they are lower in sugar compared to other fruits.  Dairy: Choose full-fat dairy products like cheese, yogurt, and cream. However, some individuals may need to limit dairy if they have lactose intolerance or dairy sensitivities.  Nuts and Seeds: These are excellent sources of healthy fats, protein, and fiber. Just be mindful of portion sizes, as they can be calorie-dense.  Whole Grains (Optional): If you include grains, opt for whole grains like quinoa, barley, and oats in small portions. However, some people may prefer to eliminate grains entirely for a stricter low-carb approach.  Avoid Sugary Foods and Starchy Carbs: This includes sugary beverages, candies, pastries, white bread, pasta, rice, and potatoes.    -------------    Netflix: hack your health secrets to the gut microbiome   Take 500 mcg of B 12 if not already in your prenatal   Real self care by Shala  Cynthia

## 2024-07-05 NOTE — PROGRESS NOTES
Patient ID: Tracie Liz is a 28 y.o. female.    Assessment/Plan:    Paresthesias  Tracie Liz is a very pleasant right-handed 28 y.o. 10 months postpartum female with history of chronic migraines and who presents today for follow-up of paresthesias.  I last saw her 1 week ago.     To review initial visit, patient reports several months of numbness at the distal tip of her right hallux that started several months ago.  Approximately 1 month prior she developed transient pins and needle sensation along the medial plantar surface that was most notable during rest.  Despite stretching and doing physical therapy exercises on her back her symptoms persisted.  She visited her PCP who placed her on prednisone 40 mg x 5 days without improvement.  The day prior to our initial visit she developed radiating paresthesias down the ventral portion of bilateral upper extremities left worse than right.  The morning of our visit patient woke up feeling lightheaded.  She felt like this was made worse by looking up.  She denies any inciting events that preceded symptom onset. On review of systems patient reported bilateral blurry vision at far site.  She reports that her hands feel slightly weak and brain fog that has been slightly impacting her at work.  She denies problems with balance, falls, urinary symptoms, Lhermitte's phenomenon, extraneous fatigue or difficulty driving.      On physical exam patient with no cranial nerve deficits appreciated.  Absent red desaturation and absent APD.  She had very mild weakness at both proximal/distal flexor and extensors of the right upper extremity.  Bilateral Enriqueta sign was positive.  She had very mild weakness at the left hip flexor.  Patient had symmetric brisk reflexes in bilateral patellar and Achilles tendons.  She had diminished reflexes of the right upper extremity biceps, triceps and brachioradialis in comparison to the left upper extremity.  Sensation was  normal all throughout with exception of decreased pinprick and temperature isolated to the most distal medial aspect of the right hallux.  Patient with normal coordination.  Romberg negative.  Gait normal including tandem gait. I ordered a stat MRI brain MS protocol and C-spine with and without contrast to rule out multiple sclerosis.  I ordered a vitamin D, B6, B12, Lyme, ESR and CRP as well as an SINTIA.  Results were close to unremarkable.  She had a central protrusion type disc herniation at C6-7 which did not result in any cord or nerve impingement.  All lab work was unremarkable.  B12 was 437 which was on the moderate and    Today she reports that the pins and needle sensation along the medial plantar surface of bilateral feet improved.  The numbness at the tip of her right big toe still continues to occur.  It is relieved upon rest and is made worse upon standing up.  She denies any progression of any symptoms.  Her brain fog still persist however she currently is weaning off breast-feeding.  On physical exam today her reflexes were 1+ at the right biceps and brachioradialis and left biceps.  Left brachioradialis was normal.  Uncertain of the significance of this but likely benign in the absence of radicular symptoms or weakness.     At this time, there is no evidence of multiple sclerosis on imaging.  Blood work without any evidence to suggest an autoimmune/inflammatory disease.  Her vitamin B12 was on the moderate end at 437.  I recommend she supplement with 500 mcg daily.  She is going to look at her prenatal vitamin to see how much daily dosage of B12 she is receiving.  She should follow-up levels with her PCP.  Otherwise, we ruled out her main concern.  Her symptoms could be due to ongoing postpartum hormonal changes.  I had a discussion with patient about having a healthy diet, sleeping well, self-care and making time for herself.  I encouraged her to restart strength training given that our body changes  after giving birth.  I will follow-up with her in 6 months just to see how she is doing.  She was made aware that if her symptoms become progressive or if she develops any new symptoms to let me know sooner so we can reevaluate.  In the future we can consider an MRI lumbar spine if her lower extremity symptoms worsen versus an EMG    *I reviewed MRI brain and C-spine images personally with patient and discussed my interpretations     Diagnoses and all orders for this visit:    Paresthesias       Subjective:    Tracie Liz is a very pleasant right-handed 28 y.o. 10 months postpartum female with history of chronic migraines and who presents today for evaluation of paresthesias.  I last saw her on 6/27/2024    Initial visit (6/27/2024):    Patient reports several months of numbness at the distal tip of her right hallux that started several months ago.  Approximately 1 month ago she developed transient pins and needle sensation along the medial plantar surface that was most notable during rest.  Despite stretching and doing physical therapy exercises on her back her symptoms persisted.  She visited her PCP who placed her on prednisone 40 mg x 5 days.  This did not help improve her symptoms.  Yesterday patient developed radiating paresthesias down the ventral portion of bilateral upper extremities left worse than right. This morning patient woke up feeling lightheaded.  She felt like this was made worse by looking up.  She denies any inciting events that preceded symptom onset.     On review of systems patient reports bilateral blurry vision at far site.  She reports that her hands feel slightly weak and brain fog that has been slightly impacting her at work.  She denies problems with balance, falls, urinary symptoms, Lhermitte's phenomenon, extraneous fatigue or difficulty driving.  On physical exam patient with no cranial nerve deficits appreciated.  Absent red desaturation and absent APD.  She had very mild  weakness at both proximal/distal flexor and extensors of the right upper extremity.  Bilateral Enriqueta sign was positive.  She had very mild weakness at the left hip flexor.  Patient had symmetric brisk reflexes in bilateral patellar and Achilles tendons.  She had diminished reflexes of the right upper extremity biceps, triceps and brachioradialis in comparison to the left upper extremity.  Sensation was normal all throughout with exception of decreased pinprick and temperature isolated to the most distal medial aspect of the right hallux.  Patient with normal coordination.  Romberg negative.  Gait normal including tandem gait.     Impression: At this time, given patient's new onset neurological symptoms starting during her postpartum period I would like to evaluate her for multiple sclerosis.  Flareups do tend to occur very commonly postpartum. There is no recent imaging other than her 2014 MRI which I personally reviewed images and it did not show any evidence of early demyelination.  During the interval past 10 years she could have developed a silent lesion.      I ordered a stat MRI brain MS protocol and C-spine with and without contrast .  I ordered a vitamin D, B6, B12, Lyme, ESR and CRP as well as an SINTIA.      Workup:    Within normal limits: Lyme panel, SINTIA, ESR, CRP, vitamin B6    Vitamin B12 437.  Recommended 500 mcg of supplementation and follow-up on levels with PCP in approximately 3 months    MRI CERVICAL SPINE W WO CONTRAST (Final) 6/30/2024  2:28 PM    Impression  Small central protrusion type disc herniation C6-7 not resulting in significant central or foraminal narrowing. Normal appearance of the cervical spinal cord.    MRI BRAIN MS WO AND W CONTRAST (Final) 6/30/2024  3:02 PM    Impression  Normal MRI of the brain.      Interval history:  Immediately after the MRIs were done I called patient and discussed results with her.   We agreed on following up today to review images and discuss further  "workup.  Pins and needles medial plantar surface of both feet improved.   Has been eating clean         The following portions of the patient's history were reviewed and updated as appropriate: allergies, current medications, past family history, past medical history, past social history, past surgical history, and problem list and review of systems.         Objective:    Blood pressure 116/72, pulse 83, temperature 98 °F (36.7 °C), temperature source Temporal, height 5' 5\" (1.651 m), weight 65 kg (143 lb 6.4 oz), SpO2 99%, currently breastfeeding.    Physical Exam  Constitutional:       General: She is awake.   Eyes:      General: Lids are normal.      Extraocular Movements: Extraocular movements intact.   Neurological:      Mental Status: She is alert.      Deep Tendon Reflexes:      Reflex Scores:       Bicep reflexes are 1+ on the right side and 1+ on the left side.       Brachioradialis reflexes are 1+ on the right side and 2+ on the left side.  Psychiatric:         Speech: Speech normal.         Neurological Exam  Mental Status  Awake and alert. Oriented to person, place and time. Speech is normal. Language is fluent with no aphasia. Attention and concentration are normal.    Cranial Nerves  CN III, IV, VI: Extraocular movements intact bilaterally. Normal lids and orbits bilaterally.  CN VII: Full and symmetric facial movement.  CN VIII: Hearing is normal.  CN XII: Tongue midline without atrophy or fasciculations.    Motor  Normal muscle bulk throughout. No abnormal involuntary movements.    Sensory  Normal proprioception.    Reflexes                                            Right                      Left  Brachioradialis                    1+                         2+  Biceps                                 1+                         1+    Coordination    Normal coordination.    Gait  Casual gait is normal including stance, stride, and arm swing.        ROS:    Review of Systems    Review of Systems "   Constitutional:  Negative for appetite change, fatigue and fever.   HENT: Negative.  Negative for hearing loss, tinnitus, trouble swallowing and voice change.    Eyes: Negative.  Negative for photophobia, pain and visual disturbance.   Respiratory: Negative.  Negative for shortness of breath.    Cardiovascular: Negative.  Negative for palpitations.   Gastrointestinal: Negative.  Negative for nausea and vomiting.   Endocrine: Negative.  Negative for cold intolerance.   Genitourinary: Negative.  Negative for dysuria, frequency and urgency.   Musculoskeletal:  Negative for back pain, gait problem, myalgias, neck pain and neck stiffness.   Skin: Negative.  Negative for rash.   Allergic/Immunologic: Negative.    Neurological: Negative.  Negative for dizziness, tremors, seizures, syncope, facial asymmetry, speech difficulty, weakness, light-headedness, numbness and headaches.   Hematological: Negative.  Does not bruise/bleed easily.   Psychiatric/Behavioral: Negative.  Negative for confusion, hallucinations and sleep disturbance.    All other systems reviewed and are negative.      I have spent 30  minutes with Patient today in which greater than 50% of this time was spent in counseling/coordination of care regarding Diagnostic results, Prognosis, Risks and benefits of tx options, Instructions for management, Patient and family education, Importance of tx compliance, Risk factor reductions, Impressions, Counseling / Coordination of care, Documenting in the medical record, Reviewing / ordering tests, medicine, procedures  , and Obtaining or reviewing history  . I also spent 10 minutes non face to face for this patient the same day.         *History obtained from patient as well as available medical record review    Author:  Jocelyne Shukla MD 7/5/2024 9:25 AM

## 2024-07-19 NOTE — PROGRESS NOTES
ASSESSMENT & PLAN:   Diagnoses and all orders for this visit:    Women's annual routine gynecological examination        The following were reviewed in today's visit: ASCCP guidelines, Gardisil vaccination, STD testing breast self exam, family planning choices, and exercise.    Patient to return to office in yearly for annual exam.     All questions have been answered to her satisfaction.        CC:  Annual Gynecologic Examination  Chief Complaint   Patient presents with    Gynecologic Exam     Pt is here for her annual exam. Pap utd. Pt doing well no concerns.  Pap 21 neg       HPI: Tracie Liz is a 28 y.o.  who presents for annual gynecologic examination.  She has the following concerns:  feels pressure/heaviness by the end of the day. She did go to PT for a few sessions. She is pumping and he goes to the breast.       Health Maintenance:    Exercise: intermittently  Breast exams/breast awareness: no    Past Medical History:   Diagnosis Date    Migraine        Past Surgical History:   Procedure Laterality Date    ANKLE SURGERY      WISDOM TOOTH EXTRACTION         Past OB/Gyn History:   No LMP recorded.    Last Pap: 2021 : no abnormalities  History of abnormal Pap smear: yes  HPV vaccine completed: yes    Patient is currently sexually active.   STD testing: no  Current contraception: condoms      Family History  Family History   Problem Relation Age of Onset    Arrhythmia Mother     Hyperlipidemia Father     No Known Problems Sister     No Known Problems Maternal Grandmother     Alzheimer's disease Maternal Grandfather     Breast cancer Paternal Grandmother     Dementia Paternal Grandfather     Colon cancer Maternal Uncle        Family history of uterine or ovarian cancer: no  Family history of breast cancer: yes  Family history of colon cancer: yes    Social History:  Social History     Socioeconomic History    Marital status: /Civil Union     Spouse name: Not on file    Number of  "children: Not on file    Years of education: Not on file    Highest education level: Not on file   Occupational History    Not on file   Tobacco Use    Smoking status: Never    Smokeless tobacco: Never   Vaping Use    Vaping status: Never Used   Substance and Sexual Activity    Alcohol use: Yes     Comment: socially    Drug use: No    Sexual activity: Yes     Partners: Male     Birth control/protection: None   Other Topics Concern    Not on file   Social History Narrative    Not on file     Social Determinants of Health     Financial Resource Strain: Not on file   Food Insecurity: Not on file   Transportation Needs: Not on file   Physical Activity: Not on file   Stress: Not on file   Social Connections: Not on file   Intimate Partner Violence: Not on file   Housing Stability: Not on file     Domestic violence screen: negative    Allergies:  Allergies   Allergen Reactions    Lactose Intolerance (Gi) - Food Allergy GI Intolerance       Medications:    Current Outpatient Medications:     docusate sodium (Colace) 100 mg capsule, Take 100 mg by mouth 2 (two) times a day, Disp: , Rfl:     Prenatal MV-Min-Fe Fum-FA-DHA (PRENATAL+DHA PO), Take by mouth, Disp: , Rfl:     Review of Systems:  Review of Systems   Constitutional:  Negative for chills and fever.   Respiratory:  Negative for shortness of breath.    Cardiovascular:  Negative for chest pain.   Gastrointestinal:  Negative for abdominal distention, abdominal pain, blood in stool, constipation, nausea and vomiting.   Genitourinary:  Negative for difficulty urinating, dyspareunia, dysuria, frequency, menstrual problem, pelvic pain, urgency, vaginal bleeding, vaginal discharge and vaginal pain.         Physical Exam:  /82 (BP Location: Left arm, Patient Position: Sitting, Cuff Size: Standard)   Ht 5' 5\" (1.651 m)   Wt 67.1 kg (148 lb)   Breastfeeding Yes   BMI 24.63 kg/m²    Physical Exam  Constitutional:       General: She is awake.      Appearance: Normal " appearance. She is well-developed.   Genitourinary:      Vulva, bladder and urethral meatus normal.      Right Labia: No rash, tenderness or lesions.     Left Labia: No tenderness, lesions or rash.     No labial fusion noted.      No vaginal discharge, erythema, tenderness or bleeding.      No vaginal prolapse present.     No vaginal atrophy present.       Right Adnexa: not tender, not full and no mass present.     Left Adnexa: not tender, not full and no mass present.     Cervix is parous.      No cervical motion tenderness, discharge, lesion or polyp.      Uterus is not enlarged, tender or irregular.      No uterine mass detected.     No urethral prolapse present.      Bladder is not tender.       Pelvic exam was performed with patient in the lithotomy position.   Breasts:     Right: No inverted nipple, mass, nipple discharge, skin change or tenderness.      Left: No inverted nipple, mass, nipple discharge, skin change or tenderness.      Breast exam comments: lactating.  HENT:      Head: Normocephalic and atraumatic.   Cardiovascular:      Rate and Rhythm: Normal rate and regular rhythm.      Heart sounds: Normal heart sounds.   Pulmonary:      Effort: Pulmonary effort is normal. No tachypnea or respiratory distress.      Breath sounds: Normal breath sounds.   Abdominal:      General: Abdomen is flat. There is no distension.      Palpations: Abdomen is soft.      Tenderness: There is no abdominal tenderness. There is no guarding or rebound.   Musculoskeletal:      Cervical back: Neck supple.   Lymphadenopathy:      Upper Body:      Right upper body: No supraclavicular or axillary adenopathy.      Left upper body: No supraclavicular or axillary adenopathy.   Neurological:      General: No focal deficit present.      Mental Status: She is alert and oriented to person, place, and time.   Psychiatric:         Mood and Affect: Mood normal.         Behavior: Behavior normal.         Thought Content: Thought content  normal.         Judgment: Judgment normal.   Vitals reviewed.                                Female

## 2024-08-09 ENCOUNTER — PROCEDURE VISIT (OUTPATIENT)
Dept: NEUROLOGY | Facility: CLINIC | Age: 29
End: 2024-08-09
Payer: COMMERCIAL

## 2024-08-09 VITALS — HEART RATE: 69 BPM | TEMPERATURE: 98.6 F | DIASTOLIC BLOOD PRESSURE: 61 MMHG | SYSTOLIC BLOOD PRESSURE: 114 MMHG

## 2024-08-09 DIAGNOSIS — G43.709 CHRONIC MIGRAINE WITHOUT AURA WITHOUT STATUS MIGRAINOSUS, NOT INTRACTABLE: Primary | ICD-10-CM

## 2024-08-09 PROCEDURE — 64615 CHEMODENERV MUSC MIGRAINE: CPT | Performed by: PHYSICIAN ASSISTANT

## 2024-08-09 NOTE — PROGRESS NOTES
"Universal Protocol   Consent: Verbal consent obtained. Written consent obtained.  Risks and benefits: risks, benefits and alternatives were discussed  Consent given by: patient  Time out: Immediately prior to procedure a \"time out\" was called to verify the correct patient, procedure, equipment, support staff and site/side marked as required.  Patient understanding: patient states understanding of the procedure being performed  Patient consent: the patient's understanding of the procedure matches consent given  Procedure consent: procedure consent matches procedure scheduled  Relevant documents: relevant documents present and verified  Patient identity confirmed: verbally with patient      Chemodenervation     Date/Time  8/9/2024 9:00 AM     Performed by  Courtney Saravia PA-C   Authorized by  Courtney Saravia PA-C     Pre-procedure details      Prepped With: Alcohol     Anesthesia  (see MAR for exact dosages):     Anesthesia method:  None   Procedure details      Position:  Upright   Botox      Botox Type:  Type A    Brand:  Botox    mL's of Botulinum Toxin:  200    Final Concentration per CC:  50 units    Needle Gauge:  30 G 2.5 inch   Procedures      Botox Procedures: chronic headache      Indications: migraines     Injection Location      Head / Face:  L superior cervical paraspinal, R superior cervical paraspinal, L , R , L frontalis, R frontalis, L medial occipitalis, R medial occipitalis, procerus, R temporalis, L temporalis, R superior trapezius and L superior trapezius    L  injection amount:  5 unit(s)    R  injection amount:  5 unit(s)    L lateral frontalis:  5 unit(s)    R lateral frontalis:  5 unit(s)    L medial frontalis:  5 unit(s)    R medial frontalis:  5 unit(s)    L temporalis injection amount:  20 unit(s)    R temporalis injection amount:  20 unit(s)    Procerus injection amount:  5 unit(s)    L medial occipitalis injection amount:  15 unit(s)    R medial " occipitalis injection amount:  15 unit(s)    L superior cervical paraspinal injection amount:  10 unit(s)    R superior cervical paraspinal injection amount:  10 unit(s)    L superior trapezius injection amount:  15 unit(s)    R superior trapezius injection amount:  15 unit(s)   Total Units      Total units used:  200    Total units discarded:  0   Post-procedure details      Chemodenervation:  Chronic migraine    Facial Nerve Location::  Bilateral facial nerve    Patient tolerance of procedure:  Tolerated well, no immediate complications   Comments        45 units frontalis  All medically necessary

## 2024-10-30 ENCOUNTER — OFFICE VISIT (OUTPATIENT)
Dept: NEUROLOGY | Facility: CLINIC | Age: 29
End: 2024-10-30
Payer: COMMERCIAL

## 2024-10-30 VITALS
HEART RATE: 91 BPM | DIASTOLIC BLOOD PRESSURE: 78 MMHG | SYSTOLIC BLOOD PRESSURE: 119 MMHG | HEIGHT: 65 IN | WEIGHT: 135 LBS | TEMPERATURE: 97.7 F | BODY MASS INDEX: 22.49 KG/M2

## 2024-10-30 DIAGNOSIS — G43.709 CHRONIC MIGRAINE WITHOUT AURA WITHOUT STATUS MIGRAINOSUS, NOT INTRACTABLE: Primary | ICD-10-CM

## 2024-10-30 DIAGNOSIS — Q04.8 CEREBELLAR TONSILLAR ECTOPIA (HCC): ICD-10-CM

## 2024-10-30 PROCEDURE — 99215 OFFICE O/P EST HI 40 MIN: CPT | Performed by: PSYCHIATRY & NEUROLOGY

## 2024-10-30 RX ORDER — AMOXICILLIN 250 MG
100 CAPSULE ORAL DAILY
COMMUNITY

## 2024-10-30 RX ORDER — IBUPROFEN 400 MG/1
400 TABLET, FILM COATED ORAL EVERY 6 HOURS PRN
COMMUNITY

## 2024-10-30 NOTE — PATIENT INSTRUCTIONS
Could add 1000 units Vitamin D daily     Let me know if you would ever like a sleep study ordered      Headache/migraine treatment:   Abortive medications (for immediate treatment of a headache):   It is ok to take acetaminophen if they help your headaches you should limit these to No more than 3 times a week to avoid medication overuse/rebound headaches.     **Please do call if you ever would like me to send in for Nurtec which would need a prior authorization paper typically stating that you tried to triptans in the past which you have tried more than 2 and once this is approved it may be expensive and there is a coupon card at their website if needed for the co-pay.  As we discussed with this 1, the more you take it also can help with prevention and you technically could take it every day prior to Botox if you have wearing off effect.  Also it does not interact with any over-the-counter medications if you happen to take those first.    Consider Excedrin tension rather than Excedrin Migraine which does not have aspirin      Over the counter preventive supplements for headaches/migraines (if you try, try for 3 months straight)  (to take every day to help prevent headaches - not to take at the time of headache):  (there are combo pills online of these)  [] Magnesium 400mg daily (If any diarrhea or upset stomach, decrease dose  as tolerated)  [] Riboflavin (Vitamin B2)  400mg daily (adults) (FYI B2 may make your urine bright/neon yellow)       Prescription preventive medications for headaches/migraines   (to take every day to help prevent headaches - not to take at the time of headache):  [x] botox every 91 days     *Typically these types of medications take time untill you see the benefit, although some may see improvement in days, often it may take weeks, especially if the medication is being titrated up to a beneficial level. Please contact us if there are any concerns or questions regarding the medication.      Lifestyle Recommendations:  [x] SLEEP - Maintain a regular sleep schedule: Adults need at least 7-8 hours of uninterrupted a night. Maintain good sleep hygiene:  Going to bed and waking up at consistent times, avoiding excessive daytime naps, avoiding caffeinated beverages in the evening, avoid excessive stimulation in the evening and generally using bed primarily for sleeping.  One hour before bedtime would recommend turning lights down lower, decreasing your activity (may read quietly, listen to music at a low volume). When you get into bed, should eliminate all technology (no texting, emailing, playing with your phone, iPad or tablet in bed).  [x] HYDRATION - Maintain good hydration.  Drink  2L of fluid a day (4 typical small water bottles)  [x] DIET - Maintain good nutrition. In particular don't skip meals and try and eat healthy balanced meals regularly.  [x] TRIGGERS - Look for other triggers and avoid them: Limit caffeine to 1-2 cups a day or less. Avoid dietary triggers that you have noticed bring on your headaches (this could include aged cheese, peanuts, MSG, aspartame and nitrates).  [x] EXERCISE - physical exercise as we all know is good for you in many ways, and not only is good for your heart, but also is beneficial for your mental health, cognitive health and  chronic pain/headaches. I would encourage at the least 5 days of physical exercise weekly for at least 30 minutes.     Education and Follow-up  [x] Please call with any questions or concerns. Of course if any new concerning symptoms go to the emergency department.  [x] Follow up 1 year, sooner if needed

## 2024-10-30 NOTE — PROGRESS NOTES
Neurology Ambulatory Visit  Name: Tracie Liz       : 1995       MRN: [id number]   Encounter Provider: Yamileth Decker MD   Encounter Date: 10/30/2024  Encounter department: NEUROLOGY [facility]      Assessment/Plan:   Tracie Liz is a delightful 29 y.o. female with a past medical history that includes vitamin-D deficiency, transient paresthesias and numbness while weaning off breast-feeding  May/2024 (negative MRI brain and C spine), small central disc protrusion C6-C7, anxiety, migraines referred here for evaluation of headache.  My initial evaluation 2021     Chronic migraine without aura without status migrainosus, not intractable  Cerebellar tonsillar ectopia  No longer breastfeeding  She reports a long history of headaches and migraines dating back to around age 19. They were very difficult to control despite multiple medication trials prior to starting emgality injections for which she has had significant improvement.  She previously followed with my Neurology colleague Dr. Gee, please see EMR for details.   Pain is typically bifrontal with typical associated migrainous features, denies aura.  -   In 2019 prior to starting emgality she had over 20 migraine days per month, often nearly daily  - as of 2021: she reports continued improvement on emgality, about 5 migraines a month that are moderate usually and typically respond to ibuprofen. Continue emgality.  - as of 8/3/2022: self discontinued emgality in Dec due to considering pregnancy at some point in the future. As expected doing worse off of it with over 15 migraines a month, lasting over 4 hours, for over 3 months that improve only a bit with OTC meds. Trial of botox for prevention. We discussed some options she could consider until pregnancy such as nurtec, she is not interested at this time.  Did recommend restarting magnesium, riboflavin and vitamin D.   - as of 2023: She reports  significant improvement with reduction in over 7 migraine days per month on Botox.  She reports she was getting headaches daily up until mid February and then headaches significantly improved since she completed her board exam mid March suggesting stress contributing.  She reports no migraines in the past month after 1 round of Botox 2/10/2023.  She is also 23 weeks pregnant and we discussed medications thought to be safest during pregnancy and breast-feeding.  She has not even needed acetaminophen/Tylenol in a month.  - as of 10/24/2023: She gave birth 8/12/2023 and reports headaches and migraines generally were better during pregnancy and a little bit worse during breast-feeding which is typical.  On average 1-2 milder headaches a week and thankfully not terrible like they were in the past due to such a significant reduction while on Botox.  She is currently breast-feeding and ibuprofen typically helps and if not Excedrin without aspirin does and we discussed which medications thought to be safest during breast-feeding.  Discussed if ever needed could consider sleep study.  - as of 10/30/2024: She reports she continues to do very well on Botox with on average 1 migraine a month that resolves with Excedrin and 1-2 mild headaches a week that resolved with ibuprofen 400 mg.  She reports she is sleeping well and denies current features of sleep apnea.  Since last visit she did see my comprehensive neurology colleague Dr. Shukla for transient numbness/paresthesias of the extremities right greater than left, brain fog, blurred vision, while weaning off breast-feeding and had MRI brain and C-spine which were unremarkable per radiology and we discussed my over read today as well as multiple labs that did not show nefarious causes for symptoms and they generally resolved over a few weeks to months and she was not interested in EMG/NCS to further evaluate.  We discussed she does have some cerebellar tonsillar ectopia right  greater than left which I suspect could be related in the setting of hormonal changes, but otherwise would defer to her comprehensive neurologist if it were to return as far as further workup.    Workup:  -MRI brain MS with without contrast 6/30/2024: Normal MRI of the brain Per radiology*We discussed as of my retrospective review 10/30/24, there are some features that are thought to be nonspecific by radiology that I am commenting on including no empty sella, no partially empty sella, 2 subtle dips in sella, some possible prominence of right optic nerve sheath left side paler, tighter ventricles than expected for age 28, cerebellar tonsils overlie the foramen magnum with at least cerebellar tonsillar ectopia right greater than left with tight junction in my opinion  -MRI C-spine with and without contrast 6/30/2024: Small central protrusion type disc herniation C6-7 not resulting in significant central or foraminal narrowing. Normal appearance of the cervical spinal cord.  Normal signal throughout the cord.  - MRI Brain 12/30/14:  No intracranial MR abnormality to account for the patient's symptoms.   Left maxillary sinus retention cyst. Per radiology    Preventative:  - we discussed headache hygiene and lifestyle factors that may improve headaches  - botox every 91 days. Discussed proper use, possible side effects and risks.  - We discussed headache preventative supplements that can be helpful including magnesium, riboflavin, vitamin D  - Past/ failed/contraindicated:  Lamictal, duloxetine, cyproheptadine, amitriptyline, venlafaxine, sertraline, zonisamide, aimovig  - future options: resume emgality since helped or alternative CGRP medication, others     Abortive:  - discussed not taking over-the-counter or prescription pain medications more than 3 days per week to prevent medication overuse/rebound headache  -   Acetaminophen or Excedrin tension/acetaminophen-caffeine helps Discussed proper use, possible side  effects and risks.  -  Past/ helped:  Decadron 2 mg, patient does not recall but per Dr. Gee Notes Olanzapine 5 mg also helped as backup, unclear if p.o. or IM Toradol  Or Depakote helped or not but has had  - Past/ failed/contraindicated:  IV Toradol, prochlorperazine, sumatriptan, rizatriptan, frovatriptan, diphenhydramine  - future options: Toradol IM or p.o., could consider trial for 5 days of Depakote or dexamethasone 4 prolonged migraine, ubrelvy, reyvow, nurte      Patient instructions        Could add 1000 units Vitamin D daily   Dr. Shukla had recommended vitamin B12    Let me know if you would ever like a sleep study ordered      Headache/migraine treatment:   Abortive medications (for immediate treatment of a headache):   It is ok to take acetaminophen if they help your headaches you should limit these to No more than 3 times a week to avoid medication overuse/rebound headaches.     **Please do call if you ever would like me to send in for Nurtec which would need a prior authorization paper typically stating that you tried to triptans in the past which you have tried more than 2 and once this is approved it may be expensive and there is a coupon card at their website if needed for the co-pay.  As we discussed with this 1, the more you take it also can help with prevention and you technically could take it every day prior to Botox if you have wearing off effect.  Also it does not interact with any over-the-counter medications if you happen to take those first.    Consider Excedrin tension rather than Excedrin Migraine which does not have aspirin      Over the counter preventive supplements for headaches/migraines (if you try, try for 3 months straight)  (to take every day to help prevent headaches - not to take at the time of headache):  (there are combo pills online of these)  [] Magnesium 400mg daily (If any diarrhea or upset stomach, decrease dose  as tolerated)  [] Riboflavin (Vitamin B2)  400mg  daily (adults) (FYI B2 may make your urine bright/neon yellow)       Prescription preventive medications for headaches/migraines   (to take every day to help prevent headaches - not to take at the time of headache):  [x] botox every 91 days     *Typically these types of medications take time untill you see the benefit, although some may see improvement in days, often it may take weeks, especially if the medication is being titrated up to a beneficial level. Please contact us if there are any concerns or questions regarding the medication.     Lifestyle Recommendations:  [x] SLEEP - Maintain a regular sleep schedule: Adults need at least 7-8 hours of uninterrupted a night. Maintain good sleep hygiene:  Going to bed and waking up at consistent times, avoiding excessive daytime naps, avoiding caffeinated beverages in the evening, avoid excessive stimulation in the evening and generally using bed primarily for sleeping.  One hour before bedtime would recommend turning lights down lower, decreasing your activity (may read quietly, listen to music at a low volume). When you get into bed, should eliminate all technology (no texting, emailing, playing with your phone, iPad or tablet in bed).  [x] HYDRATION - Maintain good hydration.  Drink  2L of fluid a day (4 typical small water bottles)  [x] DIET - Maintain good nutrition. In particular don't skip meals and try and eat healthy balanced meals regularly.  [x] TRIGGERS - Look for other triggers and avoid them: Limit caffeine to 1-2 cups a day or less. Avoid dietary triggers that you have noticed bring on your headaches (this could include aged cheese, peanuts, MSG, aspartame and nitrates).  [x] EXERCISE - physical exercise as we all know is good for you in many ways, and not only is good for your heart, but also is beneficial for your mental health, cognitive health and  chronic pain/headaches. I would encourage at the least 5 days of physical exercise weekly for at least  "30 minutes.     Education and Follow-up  [x] Please call with any questions or concerns. Of course if any new concerning symptoms go to the emergency department.  [x] Follow up 1 year, sooner if needed     CC:   We had the pleasure of evaluating Tracie Liz in neurological consultation today. Tracie Liz is a   right handed female who presents today for evaluation of headaches.     History obtained from patient as well as available medical record review.  History of Present Illness:   Interval history as of 10/30/2024  - Of note/managed by others:   -She saw Dr. Shukla 6/27/2024 and 7/5/2024 for numbness in right big toe, saw PCP steroids didn't do anything and then tingling paresthesias  Bilaterally feet and then both hands, was having a lot of brain fog and blurry vision both eyes, saw Dr. Shukla Brain and spine MRI negative, Tracie didn't want to do EMG as it started to improve over several weeks. woke up the morning of her visit initially with Dr. Shukla feeling lightheaded, per note made worse by looking up, per note hands felt slightly weak, per note \"stat MRI brain MS protocol and C-spine with and without contrast to rule out multiple sclerosis. I ordered a vitamin D, B6, B12, Lyme, ESR and CRP as well as an SINTIA. Results were close to unremarkable. She had a central protrusion type disc herniation at C6-7 which did not result in any cord or nerve impingement. All lab work was unremarkable. B12 was 437 which was on the moderate\" on follow-up 7/5/2024 \"she reports that the pins and needle sensation along the medial plantar surface of bilateral feet improved. The numbness at the tip of her right big toe still continues to occur. It is relieved upon rest and is made worse upon standing up.\"  Symptoms thought to be due to ongoing hormonal changes postpartum  Not breast-feeding anymore - this above happened while weaning off breast-feeding    - Patient concerns or questions: none    - " diagnostics of note (please see EMR for others/details):     - last eye exam: eye exam in the spring, with breast feeding was getting dry eyes and was told to take fish oil, very slight prescription but does not wear glasses, prescription is from 10 years ago and slight increase from then, doesn't wear them, no papilledema     - sleep: 8 hours, feels like sleeping fine well rested    Headaches and migraines   On average 1 migraine a month that resolves with exedrin  1-2 mild headaches a week that respond to ibuprofen 400 mg  She continues to have a reduction in over 7 migraine days per month on Botox    Preventative:   -Botox every 91 days, last date 24  No reported bothersome side effects     Abortive:   Ibuprofen 400 mg usually helps  Excedrin typically helps the migraines  Not interested in Nurtec right now as not needing  No reported bothersome side effects       Interval history as of 10/24/2023  - no significant new or concerning neurologic symptoms since last visit   - sleep - not good now due to , before this at least 8-9 hours, sleeps on side and belly and now side and belly again, has been she snored, sleep is not always restful, problems staying asleep at times, she will let me know if she ever like a sleep study at this point she is up with the , dad has CELINE    Gave birth - 22  Breast feeding - prob around a year     Headaches and migraines     1-2 a week - not terrible like in the past, just kid of annoying and improve with ibuprofen     Feels like botox is helping, better than a year ago   She reports a reduction over 7 migraine days per month after 6 months trial of botox     otis is nursing and her headaches have been a little worse since giving birth 10 weeks ago but not horrible.  She gets 1-2 a week and normally ibuprofen helps and if not excedrin helps but she only had to take that a couple times in the last 10 weeks.     Preventative:   -Botox 2/10/23, 2023,  8/11/2023 and next round has been approved    Abortive:   - ibuprofen usually helps 400 mg   Denies bothersome side effects        Interval history as of 4/19/2023  - no significant new or concerning neurologic symptoms since last visit    Pregnant 23 weeks pregnant and planning on breastfeeding    Headaches and migraines   She has had a reduction in over 7 migraine days per month after of Botox   No migraines this month  Was getting them daily until Mid Feb and then seemed to let up mid march   Better since board exam - 3/17/23, othropiedic PT    Preventative:   -Trial of botox  -started 2/10/2023    Abortive:   - has not needed tylenol in a month   Denies bothersome side effects         Interval history as of 8/3/2022  - denies any new or concerning neurologic symptoms since last visit   - considering trying for pregnancy soon     Headaches and migraines   - improved on emgality and stopped in Dec  - now 3-4 migraines a week that sometimes respond to exedrin or advil    Preventative:   - stopped emgality in Dec    -  Continue magnesium, consider adding riboflavin, vitamin D     Abortive:   ibuprofen      History as of initial visit 8/2021:  Former patient of Dr. Gee  - last saw her 9/6/19  - has been well controlled on emgality      Headaches started at what age? 19 years old  How often do the headaches occur?   -before emgality she had over 20 migraine days per month  - as of 8/2/2021: she reports continued improvement on emgality, about 5 a month that are moderate usually and typically respond to ibuprofen   What time of the day do the headaches start?  No particular time of day   How long do the headaches last? Over 4 hours, typically improve with sleep, in the pastr up to days    Are you ever headache free? Yes    Aura? without aura     Last eye exam: years ago, but no eye symptoms   - stopped wearing glasses because did not need them    Where is your headache located and pain quality?   - bifrontal pounding  throbbing pressure   What is the intensity of pain? Average: 5-7/10, worst lately worst 7 - past 10/10 - ED infusions years ago   Associated symptoms: a lot of these not anymore   [x] Nausea       [] Vomiting        [] Diarrhea  [x] Problems with concentration  [x] Photophobia  >   [x]Phonophobia      [x] Osmophobia  [] Blurred vision   [x] Prefer quiet, dark room  [] Light-headed or dizzy     [] Tinnitus    [] Hands or feet tingle or feel numb/paresthesias      [] Ptosis      [] Facial droop  [] Lacrimation  [] Nasal congestion/rhinorrhea      Number of days missed per month because of headaches:  Work (or school) days: 0    Headache triggers:  Caffeine    Have you seen someone else for headaches or pain? Yes, Dr. Gee neurology/headache specialist   Have you had trigger point injection performed and how often? No  Have you had Botox injection performed and how often? No   Have you had epidural injections or transforaminal injections performed? No  Are you current pregnant or planning on getting pregnant? No, not for a few years   Have you ever had any Brain imaging? yes     What medications do you take or have you taken for your headaches?   ABORTIVE:      Ibuprofen usually helps   exedrin - may have rebound headache next day     Past   Toradol p.o.  DHE  Depakote  IV Toradol not affected in the past  Dexamethasone a - helped - has not needed in 2 years   olanzapine 5 mg - helped as back up  - has not needed in 2 years  Prochlorperazine/Compazine did not help  Sumatriptan headache came back in 2 hours  Rizatriptan did not help  Frovatriptan did not help  Diphenhydramine/Benadryl    PREVENTIVE:     emgality - no side effects   Magnesium     Past/ failed/contraindicated:  lamictal 50/100 mg - off since maybe around 9/2019  duloxetine/cymbalta 40 mg   - off since maybe around 9/2019  Cyproheptadine 4 mg  Amitriptyline  Venlafaxine side effects of sedating  Sertraline side effects of anxiety  Zonisamide  aimovig  "      Alternative therapies used in the past for headaches? Chiropractor     LIFESTYLE  Sleep   - averages: 8 hours   Problems falling asleep?:   No  Problems staying asleep?:  No    Physical activity: walking running on feet at work all day     Water: maybe 20 oz a day   Caffeine: 0 per day    Mood:  anxiety - was during PT school, better now     The following portions of the patient's history were reviewed and updated as appropriate: allergies, current medications, past family history, past medical history, past social history, past surgical history and problem list.    Pertinent family history:  Family history of headaches:  migraine headaches in grandmother  Any family history of aneurysms - No    Pertinent social history:  Work: PT   Lives with      Illicit Drugs: denies  Alcohol/tobacco: Denies tobacco use, alcohol intake: social drinker        Pertinent lab results:   See EMR for recent labs  - 02/21/2019:  Vitamin-D 26.2, B12 364  - 11/21/2018 BMP was clipped CO2 20, CBC with WBC 10.18  TSH normal     Imaging: I have personally reviewed imaging and radiology read   - MRI Brain 12/30/14:  No intracranial MR abnormality to account for the patient's symptoms.   Left maxillary sinus retention cyst.       Objective       Physical Exam:                                                                 Vitals:            Constitutional:    /78 (BP Location: Right arm, Patient Position: Sitting, Cuff Size: Standard)   Pulse 91   Temp 97.7 °F (36.5 °C) (Temporal)   Ht 5' 5\" (1.651 m)   Wt 61.2 kg (135 lb)   Breastfeeding No   BMI 22.47 kg/m²   BP Readings from Last 3 Encounters:   10/30/24 119/78   08/09/24 114/61   07/05/24 116/72     Pulse Readings from Last 3 Encounters:   10/30/24 91   08/09/24 69   07/05/24 83         Well developed, well nourished, well groomed.        Psychiatric:  Normal behavior and appropriate affect        Able to answer questions appropriately, provide history of recent " events   Normal language and spontaneous speech.  facial expression symmetric  symmetric bulk throughout. no atrophy, fasciculations or significant abnormal movements noted during our visit from observation.   steady casual gait       ROS:  ROS obtained by medical assistant and reviewed, but if any symptoms listed below say negative, does not mean patient has not had this symptom since last visit, please see HPI for details of symptoms discussed this visit.  Regarding any abnormal or positive findings in ROS that are not neurologic related, patient instructed to address these issues with PCP or go to the ER if they are severe.    Review of Systems   Constitutional:  Negative for appetite change and fever.   HENT: Negative.  Negative for hearing loss, tinnitus, trouble swallowing and voice change.    Eyes: Negative.  Negative for photophobia and pain.   Respiratory: Negative.  Negative for shortness of breath.    Cardiovascular: Negative.  Negative for palpitations.   Gastrointestinal: Negative.  Negative for nausea and vomiting.   Endocrine: Negative.  Negative for cold intolerance.   Genitourinary: Negative.  Negative for dysuria, frequency and urgency.   Musculoskeletal: Negative.  Negative for myalgias and neck pain.   Skin: Negative.  Negative for rash.   Neurological:  Positive for headaches. Negative for dizziness, tremors, seizures, syncope, facial asymmetry, speech difficulty, weakness, light-headedness and numbness.   Hematological: Negative.  Does not bruise/bleed easily.   Psychiatric/Behavioral: Negative.  Negative for confusion, hallucinations and sleep disturbance.    All other systems reviewed and are negative.         Administrative Statements  I have spent 13 minutes prior to or after the visit and 29 minutes during the visit, for a total time of 42 minutes in caring for this patient on the day of the visit/encounter including Diagnostic results, Prognosis, Risks and benefits of tx options,  Instructions for management, Patient and family education, Importance of tx compliance, Risk factor reductions, Impressions, Counseling / Coordination of care, Documenting in the medical record, Reviewing / ordering tests, medicine, procedures  , Obtaining or reviewing history  , and Communicating with other healthcare professionals .

## 2024-11-08 ENCOUNTER — PROCEDURE VISIT (OUTPATIENT)
Dept: NEUROLOGY | Facility: CLINIC | Age: 29
End: 2024-11-08
Payer: COMMERCIAL

## 2024-11-08 VITALS
HEART RATE: 83 BPM | TEMPERATURE: 98.2 F | SYSTOLIC BLOOD PRESSURE: 126 MMHG | DIASTOLIC BLOOD PRESSURE: 79 MMHG | OXYGEN SATURATION: 98 %

## 2024-11-08 DIAGNOSIS — G43.709 CHRONIC MIGRAINE WITHOUT AURA WITHOUT STATUS MIGRAINOSUS, NOT INTRACTABLE: Primary | ICD-10-CM

## 2024-11-08 PROCEDURE — 64615 CHEMODENERV MUSC MIGRAINE: CPT | Performed by: PHYSICIAN ASSISTANT

## 2024-11-08 NOTE — PROGRESS NOTES
"Universal Protocol   procedure performed by consultantConsent: Verbal consent obtained. Written consent obtained.  Risks and benefits: risks, benefits and alternatives were discussed  Consent given by: patient  Time out: Immediately prior to procedure a \"time out\" was called to verify the correct patient, procedure, equipment, support staff and site/side marked as required.  Patient understanding: patient states understanding of the procedure being performed  Patient consent: the patient's understanding of the procedure matches consent given  Procedure consent: procedure consent matches procedure scheduled  Relevant documents: relevant documents present and verified  Patient identity confirmed: verbally with patient      Chemodenervation     Date/Time  11/8/2024 12:30 PM     Performed by  Courtney Saravia PA-C   Authorized by  Courtney Saravia PA-C     Pre-procedure details      Prepped With: Alcohol     Anesthesia  (see MAR for exact dosages):     Anesthesia method:  None   Procedure details      Position:  Upright   Botox      Botox Type:  Type A    Brand:  Botox    mL's of Botulinum Toxin:  200    Final Concentration per CC:  50 units    Needle Gauge:  30 G 2.5 inch   Procedures      Botox Procedures: chronic headache      Indications: migraines     Injection Location      Head / Face:  L superior cervical paraspinal, R superior cervical paraspinal, L , R , L frontalis, R frontalis, L medial occipitalis, R medial occipitalis, procerus, R temporalis, L temporalis, R superior trapezius and L superior trapezius    L  injection amount:  5 unit(s)    R  injection amount:  5 unit(s)    L lateral frontalis:  5 unit(s)    R lateral frontalis:  5 unit(s)    L medial frontalis:  5 unit(s)    R medial frontalis:  5 unit(s)    L temporalis injection amount:  20 unit(s)    R temporalis injection amount:  20 unit(s)    Procerus injection amount:  5 unit(s)    L medial occipitalis injection " amount:  15 unit(s)    R medial occipitalis injection amount:  15 unit(s)    L superior cervical paraspinal injection amount:  10 unit(s)    R superior cervical paraspinal injection amount:  10 unit(s)    L superior trapezius injection amount:  15 unit(s)    R superior trapezius injection amount:  15 unit(s)   Total Units      Total units used:  200    Total units discarded:  0   Post-procedure details      Chemodenervation:  Chronic migraine    Facial Nerve Location::  Bilateral facial nerve    Patient tolerance of procedure:  Tolerated well, no immediate complications   Comments        45 units frontalis  All medically necessary

## 2024-11-20 ENCOUNTER — TELEPHONE (OUTPATIENT)
Age: 29
End: 2024-11-20

## 2024-11-20 NOTE — TELEPHONE ENCOUNTER
Patient stated at last appt 11/8/24 they were rs for their Next Botox appointment for a Monday       Patient is unable to come to the office on a Monday due to work schedule       Patient is asking to be rescheduled for a Wednesday or Friday their days off     Please assist     Thank you!

## 2025-01-30 ENCOUNTER — OFFICE VISIT (OUTPATIENT)
Dept: OBGYN CLINIC | Facility: CLINIC | Age: 30
End: 2025-01-30
Payer: COMMERCIAL

## 2025-01-30 VITALS
SYSTOLIC BLOOD PRESSURE: 122 MMHG | BODY MASS INDEX: 23.16 KG/M2 | HEIGHT: 65 IN | DIASTOLIC BLOOD PRESSURE: 80 MMHG | WEIGHT: 139 LBS

## 2025-01-30 DIAGNOSIS — Z32.01 PREGNANCY, CONFIRMED, NOT FIRST: Primary | ICD-10-CM

## 2025-01-30 PROCEDURE — 99214 OFFICE O/P EST MOD 30 MIN: CPT

## 2025-01-30 PROCEDURE — 76817 TRANSVAGINAL US OBSTETRIC: CPT

## 2025-01-30 NOTE — PROGRESS NOTES
Assessment/Plan:  Diagnoses and all orders for this visit:    Pregnancy, confirmed, not first  -     AMB US OB < 14 weeks single or first gestation level 1    - Viable IUP @ 6w 4d EGA  - GIANNI 2025  - Continue PNV  - Patient to call for concerns  - RTO 2/10/25 for originally scheduled Dating scan due to early gestation and current bleeding. MFM referral and intake will be scheduled at that time.     Subjective:       Patient ID: Tracie Liz 1995      Tracie Liz is a 29 y.o.  presenting to the office for pregnancy confirmation. Patient's last menstrual period was 12/15/2024 (exact date). , placing her at 6w4d today with GIANNI of 25.    Nausea:yes  Vomiting: no  Bleeding: yes, dark-brown spotting x 2 days.   Cramping: yes, mild pelvic cramping   Headaches: no  Fatigue: no  Constipation: no  Blood type, if known: O+       OB History    Para Term  AB Living   2 1 1 0 0 1   SAB IAB Ectopic Multiple Live Births   0 0 0 0 1      # Outcome Date GA Lbr Wilson/2nd Weight Sex Type Anes PTL Lv   2 Current            1 Term 23 39w5d / 03:55 3705 g (8 lb 2.7 oz) M Vag-Spont EPI N BOBY         The following portions of the patient's history were reviewed and updated as appropriate: allergies, current medications, past family history, past medical history, past social history, past surgical history, and problem list.    Allergies:  Lactose intolerance (gi) - food allergy    Medications:    Current Outpatient Medications:     aspirin-acetaminophen-caffeine (EXCEDRIN MIGRAINE) 250-250-65 MG per tablet, Take 1 tablet by mouth every 6 (six) hours as needed for headaches, Disp: , Rfl:     Cobalamin Combinations (B-12) 100-5000 MCG SUBL, Take 100 mcg by mouth in the morning, Disp: , Rfl:     omega-3-acid ethyl esters (LOVAZA) 1 g capsule, Take 2 g by mouth daily, Disp: , Rfl:     Prenatal MV-Min-Fe Fum-FA-DHA (PRENATAL+DHA PO), Take by mouth, Disp: , Rfl:     ibuprofen (MOTRIN) 400  "mg tablet, Take 400 mg by mouth every 6 (six) hours as needed for mild pain (Patient not taking: Reported on 1/30/2025), Disp: , Rfl:       Objective:    Visit Vitals  /80   Ht 5' 5\" (1.651 m)   Wt 63 kg (139 lb)   LMP 12/15/2024 (Exact Date)   BMI 23.13 kg/m²   OB Status Pregnant   Smoking Status Never   BSA 1.7 m²     GEN: The patient was alert and oriented x3, pleasant well-appearing female in no acute distress.   PULM: nonlabored respirations  MSK: Normal gait  : WNL  Skin: warm, dry  Neuro: no focal deficits  Psych: normal affect and judgement, cooperative    Ultrasound:     Viability US     Gestational sac: present               Location: intrauterine  Yolk sac: present  Fetal pole: present               CRL: 0.46 cm = 6w1d  Cardiac activity: present               Rate: 119 bpm     Ovaries: normal appearing bilaterally  Cul de sac: absence of free fluid  Uterus: normal in appearance           Ultrasound Probe Disinfection    A transvaginal ultrasound was performed.   Prior to use, disinfection was performed with High Level Disinfection Process (Trophon)  Probe serial number [id number]: [id number] was used    I have spent a total time of 30 minutes in caring for this patient on the day of the visit/encounter including Diagnostic results, Instructions for management, Patient and family education, Documenting in the medical record, Reviewing / ordering tests, medicine, procedures  , and Obtaining or reviewing history  .     Massiel Liang PA-C  01/30/25  9:39 AM   "

## 2025-02-10 ENCOUNTER — ULTRASOUND (OUTPATIENT)
Dept: OBGYN CLINIC | Facility: CLINIC | Age: 30
End: 2025-02-10
Payer: COMMERCIAL

## 2025-02-10 VITALS
WEIGHT: 142 LBS | SYSTOLIC BLOOD PRESSURE: 116 MMHG | DIASTOLIC BLOOD PRESSURE: 78 MMHG | BODY MASS INDEX: 23.66 KG/M2 | HEIGHT: 65 IN

## 2025-02-10 DIAGNOSIS — Z32.01 PREGNANCY, CONFIRMED, NOT FIRST: Primary | ICD-10-CM

## 2025-02-10 PROCEDURE — 76817 TRANSVAGINAL US OBSTETRIC: CPT | Performed by: PHYSICIAN ASSISTANT

## 2025-02-10 PROCEDURE — 99213 OFFICE O/P EST LOW 20 MIN: CPT | Performed by: PHYSICIAN ASSISTANT

## 2025-02-10 NOTE — PROGRESS NOTES
Assessment/Plan:  - Viable IUP @ 8w 1d EGA  - GIANNI 25  - Continue PNV  - Patient to call for concerns  - RTO 3 weeks for OB intake    Encounter Diagnosis     ICD-10-CM    1. Pregnancy, confirmed, not first  Z32.01 Ambulatory Referral to Maternal Fetal Medicine     [facility] OB < 14 weeks single or first gestation level 1              Subjective:       Patient ID: Tracie Liz 1995        Tracie Liz is a 29 y.o.  presenting to the office for pregnancy confirmation. Patient's last menstrual period was 12/15/2024 (exact date). , placing her at 8w1d today with GIANNI of 25. She is feeling ok today. Had some bleeding last week which has now subsided.         OB History    Para Term  AB Living   2 1 1 0 0 1   SAB IAB Ectopic Multiple Live Births   0 0 0 0 1      # Outcome Date GA Lbr Wilson/2nd Weight Sex Type Anes PTL Lv   2 Current            1 Term 23 39w5d / 03:55 3705 g (8 lb 2.7 oz) M Vag-Spont EPI N BOBY         The following portions of the patient's history were reviewed and updated as appropriate: She  has a past medical history of Headache(784.0) () and Migraine.  She   Patient Active Problem List    Diagnosis Date Noted    Paresthesias 2024    Brain fog 2024    Chronic migraine without aura without status migrainosus, not intractable 2014     She  has a past surgical history that includes Ankle surgery; [place] tooth extraction; and Fracture surgery ().  Her family history includes Alzheimer's disease in her maternal grandfather; Arrhythmia in her mother; Breast cancer in her paternal grandmother; Colon cancer in her maternal uncle; Dementia in her paternal grandfather; Hyperlipidemia in her father; No Known Problems in her maternal grandmother and sister.  She  reports that she has never smoked. She has never used smokeless tobacco. She reports that she does not currently use alcohol. She reports that she does not use drugs.  Current  Outpatient Medications   Medication Sig Dispense Refill    aspirin-acetaminophen-caffeine (EXCEDRIN MIGRAINE) 250-250-65 MG per tablet Take 1 tablet by mouth every 6 (six) hours as needed for headaches      omega-3-acid ethyl esters (LOVAZA) 1 g capsule Take 2 g by mouth daily      Prenatal MV-Min-Fe Fum-FA-DHA (PRENATAL+DHA PO) Take by mouth      Cobalamin Combinations (B-12) 100-5000 MCG SUBL Take 100 mcg by mouth in the morning (Patient not taking: Reported on 2/10/2025)      ibuprofen (MOTRIN) 400 mg tablet Take 400 mg by mouth every 6 (six) hours as needed for mild pain (Patient not taking: Reported on 2/10/2025)       No current facility-administered medications for this visit.     Current Outpatient Medications on File Prior to Visit   Medication Sig    aspirin-acetaminophen-caffeine (EXCEDRIN MIGRAINE) 250-250-65 MG per tablet Take 1 tablet by mouth every 6 (six) hours as needed for headaches    omega-3-acid ethyl esters (LOVAZA) 1 g capsule Take 2 g by mouth daily    Prenatal MV-Min-Fe Fum-FA-DHA (PRENATAL+DHA PO) Take by mouth    Cobalamin Combinations (B-12) 100-5000 MCG SUBL Take 100 mcg by mouth in the morning (Patient not taking: Reported on 2/10/2025)    ibuprofen (MOTRIN) 400 mg tablet Take 400 mg by mouth every 6 (six) hours as needed for mild pain (Patient not taking: Reported on 2/10/2025)     No current facility-administered medications on file prior to visit.     She is allergic to lactose intolerance (gi) - food allergy..    Allergies:  Lactose intolerance (gi) - food allergy    Medications:    Current Outpatient Medications:     aspirin-acetaminophen-caffeine (EXCEDRIN MIGRAINE) 250-250-65 MG per tablet, Take 1 tablet by mouth every 6 (six) hours as needed for headaches, Disp: , Rfl:     omega-3-acid ethyl esters (LOVAZA) 1 g capsule, Take 2 g by mouth daily, Disp: , Rfl:     Prenatal MV-Min-Fe Fum-FA-DHA (PRENATAL+DHA PO), Take by mouth, Disp: , Rfl:     Cobalamin Combinations (B-12) 100-5000  "MCG SUBL, Take 100 mcg by mouth in the morning (Patient not taking: Reported on 2/10/2025), Disp: , Rfl:     ibuprofen (MOTRIN) 400 mg tablet, Take 400 mg by mouth every 6 (six) hours as needed for mild pain (Patient not taking: Reported on 2/10/2025), Disp: , Rfl:       Review of Systems:   Review of Systems   Constitutional:  Positive for fatigue.   Gastrointestinal:  Positive for nausea.   Genitourinary:  Negative for vaginal bleeding.          Objective:       Visit Vitals  /78 (BP Location: Right arm, Patient Position: Sitting, Cuff Size: Standard)   Ht 5' 5\" (1.651 m)   Wt 64.4 kg (142 lb)   LMP 12/15/2024 (Exact Date)   BMI 23.63 kg/m²   OB Status Pregnant   Smoking Status Never   BSA 1.71 m²        GEN: The patient was alert and oriented x3, pleasant well-appearing female in no acute distress.   PULM: nonlabored respirations  MSK: Normal gait  : WNL  Skin: warm, dry  Neuro: no focal deficits  Psych: normal affect and judgement, cooperative    Ultrasound:     Viability US     Gestational sac: present               Location: intrauterine  Yolk sac: present  Fetal pole: present               CRL: 1.74 cm = 8w1d  Cardiac activity: present               Rate: 175 bpm     Ovaries: normal appearing bilaterally  Cul de sac: absence of free fluid  Uterus: normal in appearance           Ultrasound Probe Disinfection    A transvaginal ultrasound was performed.   Prior to use, disinfection was performed with High Level Disinfection Process (Trophon)  Probe serial number RVRSDE: [id number] was used    Sharlene Potts PA-C  02/10/25  1:21 PM    "

## 2025-02-11 ENCOUNTER — TELEPHONE (OUTPATIENT)
Dept: NEUROLOGY | Facility: CLINIC | Age: 30
End: 2025-02-11

## 2025-02-11 NOTE — TELEPHONE ENCOUNTER
Called patient and I left a message to call back to switch [facility] appointment 02/12/25 to the afternoon as we are switching the morning to virtual

## 2025-02-21 ENCOUNTER — PROCEDURE VISIT (OUTPATIENT)
Dept: NEUROLOGY | Facility: CLINIC | Age: 30
End: 2025-02-21
Payer: COMMERCIAL

## 2025-02-21 VITALS — DIASTOLIC BLOOD PRESSURE: 58 MMHG | HEART RATE: 71 BPM | TEMPERATURE: 98.9 F | SYSTOLIC BLOOD PRESSURE: 124 MMHG

## 2025-02-21 DIAGNOSIS — G43.709 CHRONIC MIGRAINE WITHOUT AURA WITHOUT STATUS MIGRAINOSUS, NOT INTRACTABLE: Primary | ICD-10-CM

## 2025-02-21 PROCEDURE — 64615 CHEMODENERV MUSC MIGRAINE: CPT | Performed by: PHYSICIAN ASSISTANT

## 2025-02-21 NOTE — PROGRESS NOTES
"Universal Protocol   procedure performed by consultantConsent: Verbal consent obtained. Written consent obtained.  Risks and benefits: risks, benefits and alternatives were discussed  Consent given by: patient  Time out: Immediately prior to procedure a \"time out\" was called to verify the correct patient, procedure, equipment, support staff and site/side marked as required.  Patient understanding: patient states understanding of the procedure being performed  Patient consent: the patient's understanding of the procedure matches consent given  Procedure consent: procedure consent matches procedure scheduled  Relevant documents: relevant documents present and verified  Patient identity confirmed: verbally with patient      Chemodenervation     Date/Time  2/21/2025 3:30 PM     Performed by  Courtney Saravia PA-C   Authorized by  Courtney Saravia PA-C     Pre-procedure details      Prepped With: Alcohol     Anesthesia  (see MAR for exact dosages):     Anesthesia method:  None   Procedure details      Position:  Upright   Botox      Botox Type:  Type A    Brand:  Botox    mL's of Botulinum Toxin:  200    Final Concentration per CC:  50 units    Needle Gauge:  30 G 2.5 inch   Procedures      Botox Procedures: chronic headache      Indications: migraines     Injection Location      Head / Face:  L superior cervical paraspinal, R superior cervical paraspinal, L , R , L frontalis, R frontalis, L medial occipitalis, R medial occipitalis, procerus, R temporalis, L temporalis, R superior trapezius and L superior trapezius    L  injection amount:  5 unit(s)    R  injection amount:  5 unit(s)    L lateral frontalis:  5 unit(s)    R lateral frontalis:  5 unit(s)    L medial frontalis:  5 unit(s)    R medial frontalis:  5 unit(s)    L temporalis injection amount:  20 unit(s)    R temporalis injection amount:  20 unit(s)    Procerus injection amount:  5 unit(s)    L medial occipitalis injection " amount:  15 unit(s)    R medial occipitalis injection amount:  15 unit(s)    L superior cervical paraspinal injection amount:  10 unit(s)    R superior cervical paraspinal injection amount:  10 unit(s)    L superior trapezius injection amount:  15 unit(s)    R superior trapezius injection amount:  15 unit(s)   Total Units      Total units used:  200    Total units discarded:  0   Post-procedure details      Chemodenervation:  Chronic migraine    Facial Nerve Location::  Bilateral facial nerve    Patient tolerance of procedure:  Tolerated well, no immediate complications   Comments        45 units frontalis  All medically necessary

## 2025-02-26 ENCOUNTER — OB ABSTRACT (OUTPATIENT)
Dept: OBGYN CLINIC | Facility: CLINIC | Age: 30
End: 2025-02-26

## 2025-03-02 NOTE — PATIENT INSTRUCTIONS
.Congratulations!! Please review our Pregnancy Essential Guide and [facility] L&D Virtual tour from our networks website.     [facility] Pregnancy Essentials Guide  [facility] ([email or web address])     [facility] ([place]

## 2025-03-07 ENCOUNTER — TELEPHONE (OUTPATIENT)
Dept: OBGYN CLINIC | Facility: CLINIC | Age: 30
End: 2025-03-07

## 2025-03-07 ENCOUNTER — OB ABSTRACT (OUTPATIENT)
Dept: OBGYN CLINIC | Facility: CLINIC | Age: 30
End: 2025-03-07

## 2025-03-07 ENCOUNTER — INITIAL PRENATAL (OUTPATIENT)
Dept: OBGYN CLINIC | Facility: CLINIC | Age: 30
End: 2025-03-07
Payer: COMMERCIAL

## 2025-03-07 VITALS
SYSTOLIC BLOOD PRESSURE: 118 MMHG | BODY MASS INDEX: 23.69 KG/M2 | HEIGHT: 65 IN | WEIGHT: 142.2 LBS | DIASTOLIC BLOOD PRESSURE: 80 MMHG

## 2025-03-07 DIAGNOSIS — O02.1 MISSED ABORTION: Primary | ICD-10-CM

## 2025-03-07 DIAGNOSIS — Z3A.09 9 WEEKS GESTATION OF PREGNANCY: ICD-10-CM

## 2025-03-07 PROCEDURE — 99214 OFFICE O/P EST MOD 30 MIN: CPT | Performed by: PHYSICIAN ASSISTANT

## 2025-03-07 NOTE — TELEPHONE ENCOUNTER
.Called and spoke with pt.   Surgery is now scheduled.   Please see the [facility] OB GYN Department Surgery Scheduling Sheet in this encounter for further information.

## 2025-03-07 NOTE — PROGRESS NOTES
Assessment/Plan:  - Reviewed US findings of 9wk GA fetus with absent cardiac activity, expected to be 11wk GA.  - Discussed expectant management vs D&E  - Patient desires to proceed with D&E with chromosome analysis  - Reviewed process of procedure, risks, NPO after midnight, postoperative course  - Patient expresses understanding. All questions answered  - Consents on admit  - RH positive, rhogam not indicated  - Reviewed possible bleeding, precautions given  - patient to reach out with any questions  - Will have surgical scheduler call to schedule      Encounter Diagnosis     ICD-10-CM    1. Missed   O02.1       2. 9 weeks gestation of pregnancy  Z3A.09               Subjective:       Patient ID: Tracie Liz 1995        Tracie Liz is a 29 y.o.  presenting to the office for pregnancy intake visit at 11w5d. She is doing ok and not having any symptoms. US was offered due to bleeding in early pregnancy.     Upon US findings, missed ab was noted at 9wk GA.      OB History    Para Term  AB Living   2 1 1 0 0 1   SAB IAB Ectopic Multiple Live Births   0 0 0 0 1      # Outcome Date GA Lbr Wilson/2nd Weight Sex Type Anes PTL Lv   2 Current            1 Term 23 39w5d / 03:55 3705 g (8 lb 2.7 oz) M Vag-Spont EPI N BOBY         The following portions of the patient's history were reviewed and updated as appropriate: She  has a past medical history of Headache(784.0) () and Migraine.  She   Patient Active Problem List    Diagnosis Date Noted    Paresthesias 2024    Brain fog 2024    Chronic migraine without aura without status migrainosus, not intractable 2014     She  has a past surgical history that includes Ankle surgery; [place] tooth extraction; and Fracture surgery ().  Her family history includes Alzheimer's disease in her maternal grandfather; Arrhythmia in her mother; Breast cancer in her paternal grandmother; Colon cancer in her  maternal uncle; Dementia in her paternal grandfather; Hyperlipidemia in her father; No Known Problems in her maternal grandmother and sister.  She  reports that she has never smoked. She has never used smokeless tobacco. She reports that she does not currently use alcohol. She reports that she does not use drugs.  Current Outpatient Medications   Medication Sig Dispense Refill    aspirin-acetaminophen-caffeine (EXCEDRIN MIGRAINE) 250-250-65 MG per tablet Take 1 tablet by mouth every 6 (six) hours as needed for headaches      Cobalamin Combinations (B-12) 100-5000 MCG SUBL Take 100 mcg by mouth in the morning (Patient not taking: Reported on 2/10/2025)      ibuprofen (MOTRIN) 400 mg tablet Take 400 mg by mouth every 6 (six) hours as needed for mild pain (Patient not taking: Reported on 2/10/2025)      omega-3-acid ethyl esters (LOVAZA) 1 g capsule Take 2 g by mouth daily      Prenatal MV-Min-Fe Fum-FA-DHA (PRENATAL+DHA PO) Take by mouth       Current Facility-Administered Medications   Medication Dose Route Frequency Provider Last Rate Last Admin    Botulinum Toxin Type A SOLR 200 Units  200 Units Intramuscular Q3 Months    200 Units at 02/21/25 1544     Current Outpatient Medications on File Prior to Visit   Medication Sig    aspirin-acetaminophen-caffeine (EXCEDRIN MIGRAINE) 250-250-65 MG per tablet Take 1 tablet by mouth every 6 (six) hours as needed for headaches    Cobalamin Combinations (B-12) 100-5000 MCG SUBL Take 100 mcg by mouth in the morning (Patient not taking: Reported on 2/10/2025)    ibuprofen (MOTRIN) 400 mg tablet Take 400 mg by mouth every 6 (six) hours as needed for mild pain (Patient not taking: Reported on 2/10/2025)    omega-3-acid ethyl esters (LOVAZA) 1 g capsule Take 2 g by mouth daily    Prenatal MV-Min-Fe Fum-FA-DHA (PRENATAL+DHA PO) Take by mouth     Current Facility-Administered Medications on File Prior to Visit   Medication    Botulinum Toxin Type A SOLR 200 Units     She is allergic  "to lactose intolerance (gi) - food allergy..    Allergies:  Lactose intolerance (gi) - food allergy    Medications:    Current Outpatient Medications:     aspirin-acetaminophen-caffeine (EXCEDRIN MIGRAINE) 250-250-65 MG per tablet, Take 1 tablet by mouth every 6 (six) hours as needed for headaches, Disp: , Rfl:     Cobalamin Combinations (B-12) 100-5000 MCG SUBL, Take 100 mcg by mouth in the morning (Patient not taking: Reported on 2/10/2025), Disp: , Rfl:     ibuprofen (MOTRIN) 400 mg tablet, Take 400 mg by mouth every 6 (six) hours as needed for mild pain (Patient not taking: Reported on 2/10/2025), Disp: , Rfl:     omega-3-acid ethyl esters (LOVAZA) 1 g capsule, Take 2 g by mouth daily, Disp: , Rfl:     Prenatal MV-Min-Fe Fum-FA-DHA (PRENATAL+DHA PO), Take by mouth, Disp: , Rfl:     Current Facility-Administered Medications:     Botulinum Toxin Type A SOLR 200 Units, 200 Units, Intramuscular, Q3 Months, , 200 Units at 02/21/25 1544      Review of Systems:   Review of Systems   Constitutional:  Negative for fatigue.   Gastrointestinal:  Negative for nausea.   Genitourinary:  Negative for vaginal bleeding, vaginal discharge and vaginal pain.          Objective:       Visit Vitals  /80 (BP Location: Left arm, Patient Position: Sitting, Cuff Size: Standard)   Ht 5' 5\" (1.651 m)   Wt 64.5 kg (142 lb 3.2 oz)   LMP 12/15/2024 (Exact Date)   BMI 23.66 kg/m²   OB Status Pregnant   Smoking Status Never   BSA 1.71 m²        GEN: The patient was alert and oriented x3, pleasant well-appearing female in no acute distress.   PULM: nonlabored respirations  MSK: Normal gait  : WNL  Skin: warm, dry  Neuro: no focal deficits  Psych: normal affect and judgement, cooperative    Ultrasound:     Viability US     Gestational sac: present               Location: intrauterine   Size: avg 3.27cm (8w3d)  Yolk sac: absent  Fetal pole: present               CRL: 2.30 cm = 9w0d  Cardiac activity: absent, no color flow        Ovaries: " normal appearing bilaterally  Cul de sac: absence of free fluid  Uterus: normal in appearance           Ultrasound Probe Disinfection    A transvaginal ultrasound was performed.   Prior to use, disinfection was performed with High Level Disinfection Process (Trophon)  Probe serial number RVRSDE: [id number] was used    Sharlene Potts PA-C  03/07/25  9:12 AM

## 2025-03-07 NOTE — TELEPHONE ENCOUNTER
.[facility] OB GYN Department  Surgery Scheduling Sheet    Patient Name: Tracie Liz  : 1995    Provider: Dr. Valarie Cedeno / Sharlene Potts PA-C     Needed: no; Language: N/A    Procedure: exam under anesthesia and dilation and evacuation at 9 weeks     Diagnosis: missed AB    Special Needs or Equipment: chromosome testing kit    Anesthesia: IV sedation with anesthesia    Length of stay: outpatient  Does patient have comorbid conditions that will require close perioperative monitoring prior to safe discharge: no    The patient has comorbid conditions that will require close perioperative monitoring prior to safe discharge, including N/A.   This may require acute care beyond the usual and routine recovery period. As such, inpatient admission post-operatively is expected and appropriate, and anticipated hospital length of stay will be >2 midnights.    Pre-Admission Testing Needed: no   Labs that should be ordered: NA    Order PAT that is recommended in prep for procedure?: Not Indicated    Medical Clearance Needed: no; Provider: N/A    MA Form Signed (tubals/hysterectomy): Not Indicated    Surgical Drink Given: no     How many days out of work: 2 day(s)     How many days no drivin day(s)       Is pre op appt needed?  no  Interval for post op appt: 2 week(s)       For Surgical Scheduler:     Surgery Scheduled On: MON. 03/10/25-MORNING  [place]: [facility]    Pre-op Appt: ON ADMIT  Post op Appt: 25  Consult/Medical clearance appt: N/A

## 2025-03-09 ENCOUNTER — NURSE TRIAGE (OUTPATIENT)
Dept: OTHER | Facility: OTHER | Age: 30
End: 2025-03-09

## 2025-03-10 ENCOUNTER — NURSE TRIAGE (OUTPATIENT)
Age: 30
End: 2025-03-10

## 2025-03-10 ENCOUNTER — OFFICE VISIT (OUTPATIENT)
Dept: OBGYN CLINIC | Facility: CLINIC | Age: 30
End: 2025-03-10
Payer: COMMERCIAL

## 2025-03-10 VITALS
HEIGHT: 65 IN | BODY MASS INDEX: 23.32 KG/M2 | DIASTOLIC BLOOD PRESSURE: 80 MMHG | SYSTOLIC BLOOD PRESSURE: 122 MMHG | WEIGHT: 140 LBS

## 2025-03-10 DIAGNOSIS — O03.9 COMPLETE ABORTION: Primary | ICD-10-CM

## 2025-03-10 PROCEDURE — 76817 TRANSVAGINAL US OBSTETRIC: CPT | Performed by: OBSTETRICS & GYNECOLOGY

## 2025-03-10 PROCEDURE — 99213 OFFICE O/P EST LOW 20 MIN: CPT | Performed by: OBSTETRICS & GYNECOLOGY

## 2025-03-10 NOTE — PROGRESS NOTES
"Name: Tracie Liz      : 1995      MRN: [id number]  Encounter Provider: Valarie Stovall MD  Encounter Date: 3/10/2025   Encounter department: [facility]  :  Assessment & Plan  Complete   Uterus empty on TVUS- completed    No clear evidence of retained POC   No evidence of infection  Discussed expected bleeding pattern   If she has heavy bleeding, pain, fever, chills, foul smelling discharge this could be sign of infection.   She understands that if this occurs she would need to have her uterus evacuated.   Will check in with her on Friday. Follow up in 2 weeks to check in.   Orders:    AMB US OB < 14 weeks single or first gestation level 1        History of Present Illness   Tracie is a 30 y/o  with known missed ab at 11w here with vaginal bleeding and cramping.   She was scheduled for D&E today. Last night she started to have bleeding with intense cramping.   She notes that she passed the pregnancy and is sure. Shortly afterward notes that she had significantly decreased pain and bleeding.   She has had some spotting today. Denies fever, chills, chest pain, shortness of breath and heavy vaginal bleeding.         History obtained from: patient    Review of Systems   Constitutional:  Negative for chills and fever.   Respiratory:  Negative for shortness of breath.    Cardiovascular:  Negative for chest pain.   Gastrointestinal:  Negative for abdominal pain.   Genitourinary:  Positive for vaginal bleeding. Negative for vaginal discharge and vaginal pain.   Musculoskeletal:  Negative for back pain.   Neurological:  Negative for dizziness and light-headedness.   Psychiatric/Behavioral:  The patient is nervous/anxious.           Objective   /80   Ht 5' 5\" (1.651 m)   Wt 63.5 kg (140 lb)   LMP 12/15/2024 (Exact Date)   BMI 23.30 kg/m²      Physical Exam  Constitutional:       General: She is not in acute distress.     Appearance: Normal appearance. " She is not ill-appearing, toxic-appearing or diaphoretic.   HENT:      Head: Normocephalic and atraumatic.      Nose: Nose normal.   Cardiovascular:      Rate and Rhythm: Normal rate.   Pulmonary:      Effort: Pulmonary effort is normal.   Abdominal:      Palpations: Abdomen is soft.      Tenderness: There is no abdominal tenderness. There is no guarding or rebound.   Genitourinary:     Comments: Normal external female genitalia.   On speculum exam, old blood noted in the vaginal vault   Cervix is visualized and appears dilated.   Small clot noted at the os   No active bleeding, malodorous discharge or tissue at the cervix.   She is 1cm dilated   No CMT or fundal tenderness on bimanual exam   Musculoskeletal:         General: Normal range of motion.      Cervical back: Normal range of motion.   Skin:     General: Skin is warm and dry.   Neurological:      General: No focal deficit present.      Mental Status: She is alert. Mental status is at baseline.   Psychiatric:         Mood and Affect: Mood normal.         Behavior: Behavior normal.         Judgment: Judgment normal.

## 2025-03-10 NOTE — TELEPHONE ENCOUNTER
Reason for Disposition  • Caller has already spoken with the PCP (or office), and has no further questions    Additional Information  • Caller has already spoken to PCP (doctor or NP/PA) or another triager    Answer Assessment - Initial Assessment Questions  Contacted Tracie who states she has already spoken with staff and was advised to come in at 10:30.  No further questions    Protocols used: Information Only Call - No Triage-Adult-[place], No Contact or Duplicate Contact Call-Adult-[place]

## 2025-03-10 NOTE — TELEPHONE ENCOUNTER
"FOLLOW UP: Patient can be seen in office 3/10 before the scheduled procedure per Dr Long    REASON FOR CONVERSATION: Contractions and Miscarriage    SYMPTOMS: severe cramping, vaginal bleeding    OTHER: Patient scheduled for D&E tomorrow 3/10. On call provider paged and stated the patient can take 600 mg Ibuprofen every 6 hours and to expect bleeding and cramping for 1 to 3 hours.    DISPOSITION: No disposition on file.      Reason for Disposition   [1] Constant abdominal pain AND [2] present > 2 hours    Answer Assessment - Initial Assessment Questions  1. LOCATION: \"Where does it hurt?\"         Cramping in uterus as well as vaginal bleeding.    2. RADIATION: \"Does the pain shoot anywhere else?\" (e.g., chest, back, shoulder)          3. ONSET: \"When did the pain begin?\" (e.g., minutes, hours or days ago)         Twenty minutes ago    4. ONSET: \"Gradual or sudden onset?\"        Gradually started last night and has worsened in the last twenty minutes.     6. SEVERITY: \"How bad is the pain?\" \"What does it keep you from doing?\"  (e.g., Scale 1-10; mild, moderate, or severe)        Severe    8. CAUSE: \"What do you think is causing the stomach pain?\"        Miscarriage    9. RELIEVING/AGGRAVATING FACTORS: \"What makes it better or worse?\" (e.g., antacids, bowel movement, movement)        Nothing is relieving the pain, patient has taken tylenol    10. OTHER SYMPTOMS: \"Do you have any other symptoms?\" (e.g., back pain, diarrhea, fever, urination pain, vaginal bleeding, vaginal discharge, vomiting)            11. GIANNI: \"What date are you expecting to deliver?\"          DE scheduled tomorrow    Protocols used: Pregnancy - Abdominal Pain Less Than 20 Weeks EGA-Adult-AH    "

## 2025-03-10 NOTE — TELEPHONE ENCOUNTER
Regarding: scheduled D & E with fetus out.  ----- Message from Debbie TORREZ sent at 3/10/2025  7:57 AM EDT -----  She is a [facility] pt    ----- Message from Debbie TORREZ sent at 3/10/2025  7:55 AM EDT -----  Patient is scheduled for a D & E today and has noticed that the fetus has come out. She still has vaginal bleeding but no pain. She is asking if she still needs the procedure or what other recommendations.

## 2025-03-14 ENCOUNTER — TELEPHONE (OUTPATIENT)
Age: 30
End: 2025-03-14

## 2025-03-14 NOTE — TELEPHONE ENCOUNTER
Patient called, was advised to follow-up with Dr Stovall today from 3/10 encounter.     Pt declined s/w CTS, advised they are having some light bleeding but no other sxs; denies cramping, pain, fever, chills.     Dr Stovall reflects offsite today. Please reach out to pt if needed.

## 2025-03-28 ENCOUNTER — OFFICE VISIT (OUTPATIENT)
Dept: OBGYN CLINIC | Facility: CLINIC | Age: 30
End: 2025-03-28
Payer: COMMERCIAL

## 2025-03-28 VITALS
DIASTOLIC BLOOD PRESSURE: 78 MMHG | HEIGHT: 65 IN | WEIGHT: 138 LBS | SYSTOLIC BLOOD PRESSURE: 124 MMHG | BODY MASS INDEX: 22.99 KG/M2

## 2025-03-28 DIAGNOSIS — O03.9 COMPLETE ABORTION: Primary | ICD-10-CM

## 2025-03-28 PROCEDURE — 99213 OFFICE O/P EST LOW 20 MIN: CPT | Performed by: OBSTETRICS & GYNECOLOGY

## 2025-03-28 NOTE — PROGRESS NOTES
"Name: Tracie Liz      : 1995      MRN: [id number]  Encounter Provider: Valarie Stovall MD  Encounter Date: 3/28/2025   Encounter department: [facility]  :  Assessment & Plan  Complete   Doing well physically after the miscarriage   No bleeding now   She wants to wait to try for pregnancy again until the fall   Will use condoms in the meantime.   Offered support during this time         History of Present Illness   HPI  Tracie Liz is a 29 y.o.  here for f/u after miscarriage.   She did pass the tissue spontaneously. She had bleeding for about 2 weeks after but has now stopped.   She denies fever, chills, abdominal pain, foul discharge. She and her  want to wait until fall to try again.     Review of Systems   Constitutional:  Negative for chills and fever.   Gastrointestinal:  Negative for abdominal pain.   Genitourinary:  Negative for vaginal bleeding and vaginal discharge.   Neurological:  Negative for dizziness and light-headedness.          Objective   /78   Ht 5' 5\" (1.651 m)   Wt 62.6 kg (138 lb)   Breastfeeding No   BMI 22.96 kg/m²      Physical Exam  Constitutional:       General: She is not in acute distress.     Appearance: Normal appearance. She is not ill-appearing, toxic-appearing or diaphoretic.   HENT:      Head: Normocephalic and atraumatic.      Nose: Nose normal.   Pulmonary:      Effort: Pulmonary effort is normal. No respiratory distress.   Musculoskeletal:         General: Normal range of motion.      Cervical back: Normal range of motion.   Neurological:      General: No focal deficit present.      Mental Status: She is alert. Mental status is at baseline.   Psychiatric:         Mood and Affect: Mood normal.         Behavior: Behavior normal.         Thought Content: Thought content normal.         Judgment: Judgment normal.           "

## 2025-05-14 ENCOUNTER — TELEPHONE (OUTPATIENT)
Age: 30
End: 2025-05-14

## 2025-05-14 NOTE — TELEPHONE ENCOUNTER
Hello,      Please see the below. Thank you      Appointment canceled for Tracie Liz ([id number])   Visit type: BOTOX INJECTION [facility]   11/25/2025 11:30 AM (30 minutes) with Courtney Saravia PA-C in [facility]      Reason for cancellation: Canceled via MyChart

## 2025-05-15 ENCOUNTER — APPOINTMENT (OUTPATIENT)
Dept: LAB | Facility: CLINIC | Age: 30
End: 2025-05-15

## 2025-05-15 ENCOUNTER — TRANSCRIBE ORDERS (OUTPATIENT)
Dept: LAB | Facility: CLINIC | Age: 30
End: 2025-05-15

## 2025-05-15 DIAGNOSIS — Z00.8 HEALTH EXAMINATION IN POPULATION SURVEYS: Primary | ICD-10-CM

## 2025-05-15 DIAGNOSIS — Z00.8 HEALTH EXAMINATION IN POPULATION SURVEYS: ICD-10-CM

## 2025-05-15 LAB
CHOLEST SERPL-MCNC: 215 MG/DL (ref ?–200)
EST. AVERAGE GLUCOSE BLD GHB EST-MCNC: 94 MG/DL
HBA1C MFR BLD: 4.9 %
HDLC SERPL-MCNC: 76 MG/DL
LDLC SERPL CALC-MCNC: 127 MG/DL (ref 0–100)
NONHDLC SERPL-MCNC: 139 MG/DL
TRIGL SERPL-MCNC: 62 MG/DL (ref ?–150)

## 2025-05-15 PROCEDURE — 36415 COLL VENOUS BLD VENIPUNCTURE: CPT

## 2025-05-15 PROCEDURE — 83036 HEMOGLOBIN GLYCOSYLATED A1C: CPT

## 2025-05-15 PROCEDURE — 80061 LIPID PANEL: CPT

## 2025-05-23 ENCOUNTER — PROCEDURE VISIT (OUTPATIENT)
Dept: NEUROLOGY | Facility: CLINIC | Age: 30
End: 2025-05-23
Payer: COMMERCIAL

## 2025-05-23 VITALS — SYSTOLIC BLOOD PRESSURE: 122 MMHG | TEMPERATURE: 98.7 F | DIASTOLIC BLOOD PRESSURE: 81 MMHG | HEART RATE: 75 BPM

## 2025-05-23 DIAGNOSIS — G43.709 CHRONIC MIGRAINE WITHOUT AURA WITHOUT STATUS MIGRAINOSUS, NOT INTRACTABLE: Primary | ICD-10-CM

## 2025-05-23 PROCEDURE — 64615 CHEMODENERV MUSC MIGRAINE: CPT | Performed by: PHYSICIAN ASSISTANT

## 2025-05-23 NOTE — PROGRESS NOTES
"Universal Protocol   procedure performed by consultantConsent: Verbal consent obtained. Written consent obtained  Risks and benefits: risks, benefits and alternatives were discussed  Consent given by: patient  Time out: Immediately prior to procedure a \"time out\" was called to verify the correct patient, procedure, equipment, support staff and site/side marked as required.  Patient understanding: patient states understanding of the procedure being performed  Patient consent: the patient's understanding of the procedure matches consent given  Procedure consent: procedure consent matches procedure scheduled  Relevant documents: relevant documents present and verified  Patient identity confirmed: verbally with patient      Chemodenervation     Date/Time  5/23/2025 9:30 AM     Performed by  Courtney Saravia PA-C   Authorized by  Courtney Saravia PA-C     Pre-procedure details      Prepped With: Alcohol     Anesthesia  (see MAR for exact dosages):     Anesthesia method:  None   Procedure details      Position:  Upright   Botox      Botox Type:  Type A    Brand:  Botox    mL's of Botulinum Toxin:  200    Final Concentration per CC:  50 units    Needle Gauge:  30 G 2.5 inch   Procedures      Botox Procedures: chronic headache      Indications: migraines     Injection Location      Head / Face:  L superior cervical paraspinal, R superior cervical paraspinal, L , R , L frontalis, R frontalis, L medial occipitalis, R medial occipitalis, procerus, R temporalis, L temporalis, R superior trapezius and L superior trapezius    L  injection amount:  5 unit(s)    R  injection amount:  5 unit(s)    L lateral frontalis:  5 unit(s)    R lateral frontalis:  5 unit(s)    L medial frontalis:  5 unit(s)    R medial frontalis:  5 unit(s)    L temporalis injection amount:  20 unit(s)    R temporalis injection amount:  20 unit(s)    Procerus injection amount:  5 unit(s)    L medial occipitalis injection " amount:  15 unit(s)    R medial occipitalis injection amount:  15 unit(s)    L superior cervical paraspinal injection amount:  10 unit(s)    R superior cervical paraspinal injection amount:  10 unit(s)    L superior trapezius injection amount:  15 unit(s)    R superior trapezius injection amount:  15 unit(s)   Total Units      Total units used:  200    Total units discarded:  0   Post-procedure details      Chemodenervation:  Chronic migraine    Facial Nerve Location::  Bilateral facial nerve    Patient tolerance of procedure:  Tolerated well, no immediate complications   Comments        45 units frontalis  All medically necessary

## 2025-07-16 ENCOUNTER — OFFICE VISIT (OUTPATIENT)
Dept: FAMILY MEDICINE CLINIC | Facility: CLINIC | Age: 30
End: 2025-07-16
Payer: COMMERCIAL

## 2025-07-16 VITALS
HEIGHT: 65 IN | WEIGHT: 128 LBS | DIASTOLIC BLOOD PRESSURE: 72 MMHG | OXYGEN SATURATION: 99 % | RESPIRATION RATE: 16 BRPM | SYSTOLIC BLOOD PRESSURE: 110 MMHG | HEART RATE: 91 BPM | BODY MASS INDEX: 21.33 KG/M2

## 2025-07-16 DIAGNOSIS — E78.2 MIXED HYPERLIPIDEMIA: ICD-10-CM

## 2025-07-16 DIAGNOSIS — Z00.00 ENCOUNTER FOR SCREENING AND PREVENTATIVE CARE: ICD-10-CM

## 2025-07-16 DIAGNOSIS — Z00.00 ANNUAL PHYSICAL EXAM: Primary | ICD-10-CM

## 2025-07-16 DIAGNOSIS — G43.709 CHRONIC MIGRAINE WITHOUT AURA WITHOUT STATUS MIGRAINOSUS, NOT INTRACTABLE: ICD-10-CM

## 2025-07-16 PROBLEM — R41.89 BRAIN FOG: Status: RESOLVED | Noted: 2024-06-27 | Resolved: 2025-07-16

## 2025-07-16 PROCEDURE — 99395 PREV VISIT EST AGE 18-39: CPT | Performed by: FAMILY MEDICINE

## 2025-07-16 NOTE — PROGRESS NOTES
Adult Annual Physical  Name: Tracie Liz      : 1995      MRN: [id number]  Encounter Provider: Toi Ventura DO  Encounter Date: 2025   Encounter department: [facility]    :  Assessment & Plan  Annual physical exam  Encounter for screening and preventative care  Patient already obtain labs for caring for you.  Her A1c was within normal limits, her cholesterol is mildly elevated.  See below.  Her vitals are stable with a normal BMI, blood pressure, pulse, and pulse ox.  Patient to continue with healthy diet and exercise.       Chronic migraine without aura without status migrainosus, not intractable  Patient has known history of chronic migraines.  She currently follows with neurology and obtain Botox every 3 months.  She utilizes ibuprofen and Excedrin as needed for breakthrough pain.  Patient to continue to follow-up with neurology and inform me or specialist if she has an exacerbation of symptoms.       Mixed hyperlipidemia  Mild elevation in total cholesterol and LDL.  She has great HDL in the 70s.  At this time I do not recommend that she start any medication, but I would monitor the foods that are higher in cholesterol especially since she is gone low-carb.  She has already reduced the amount of cholesterol in her diet, and we will continue to do so over the next year.  I do not feel strongly that we need to repeat labs within the year, especially patient remains at current BMI and exercise level.           Preventive Screenings:  - Diabetes Screening: screening up-to-date  - Cholesterol Screening: screening not indicated and has hyperlipidemia   - Hepatitis C screening: screening up-to-date   - HIV screening: screening up-to-date   - Colon cancer screening: screening not indicated   - Lung cancer screening: screening not indicated          History of Present Illness     Adult Annual Physical:  Patient presents for annual physical. Patient presents today for  annual physical.  She has no new concerns.  She continues to follow with neurology and obtain Botox about every 3 months.  She uses ibuprofen and Excedrin as needed for headaches.  Unfortunately the patient noted that she had a miscarriage in March, and they are still attempting to become pregnant.  She is working with GYN at this time.  She has been eating and drinking well and exercising.  According to her caring for you labs, she had a mild elevation in cholesterol.  We did review diet changes and she is already made some decreases to foods that are higher in cholesterol.  Her  and herself recently went to a low-carb diet and increased the amount of cholesterol due to the switch..     Diet and Physical Activity:  - Diet/Nutrition: limited junk food, low carb diet and intermittent fasting.  - Exercise: walking, moderate cardiovascular exercise, vigorous cardiovascular exercise, strength training exercises, 3-4 times a week on average and 30-60 minutes on average.    Depression Screening:  - PHQ-2 Score: 0    General Health:  - Sleep: sleeps well and 7-8 hours of sleep on average.  - Hearing: normal hearing bilateral ears.  - Vision: no vision problems.  - Dental: regular dental visits, brushes teeth twice daily and floss regularly.    /GYN Health:  - Follows with GYN: yes.   - Menopause: premenopausal.   - Last menstrual cycle: 5/26/2025.   - History of STDs: no  - Contraception:. miscarriage in march      Advanced Care Planning:  - Has an advanced directive?: no    - Has a durable medical POA?: no      Review of Systems   Constitutional:  Negative for chills, fever and unexpected weight change.   HENT:  Negative for congestion, ear discharge, ear pain, hearing loss, postnasal drip, rhinorrhea, sinus pressure, sinus pain, sore throat and tinnitus.    Eyes:  Negative for visual disturbance.   Respiratory:  Negative for cough, chest tightness and shortness of breath.    Cardiovascular:  Negative for chest  "pain and palpitations.   Gastrointestinal:  Negative for abdominal pain, blood in stool, constipation, diarrhea, nausea and vomiting.   Genitourinary:  Negative for dysuria and frequency.   Skin:  Negative for rash and wound.   Neurological:  Positive for headaches. Negative for dizziness and light-headedness.   Psychiatric/Behavioral:  Negative for self-injury and suicidal ideas.        Medical History Reviewed by provider this encounter:     .  Medications Ordered Prior to Encounter[1]   Social History[2]    Objective   /72   Pulse 91   Resp 16   Ht 5' 5\" (1.651 m)   Wt 58.1 kg (128 lb)   SpO2 99%   BMI 21.30 kg/m²     Physical Exam  Constitutional:       General: She is not in acute distress.     Appearance: Normal appearance. She is normal weight. She is not ill-appearing, toxic-appearing or diaphoretic.   HENT:      Head: Normocephalic and atraumatic.      Right Ear: Tympanic membrane, ear canal and external ear normal. There is no impacted cerumen.      Left Ear: Tympanic membrane, ear canal and external ear normal. There is no impacted cerumen.      Nose: Nose normal. No congestion or rhinorrhea.      Mouth/Throat:      Mouth: Mucous membranes are moist.      Pharynx: Oropharynx is clear. No oropharyngeal exudate or posterior oropharyngeal erythema.     Eyes:      General:         Right eye: No discharge.         Left eye: No discharge.      Pupils: Pupils are equal, round, and reactive to light.       Cardiovascular:      Rate and Rhythm: Normal rate and regular rhythm.      Heart sounds: Normal heart sounds. No murmur heard.     No friction rub. No gallop.   Pulmonary:      Effort: Pulmonary effort is normal. No respiratory distress.      Breath sounds: Normal breath sounds. No stridor. No wheezing, rhonchi or rales.   Abdominal:      General: Abdomen is flat. There is no distension.      Tenderness: There is no abdominal tenderness.     Musculoskeletal:      Cervical back: Neck supple. No " tenderness.   Lymphadenopathy:      Cervical: No cervical adenopathy.     Skin:     General: Skin is warm.      Capillary Refill: Capillary refill takes less than 2 seconds.     Neurological:      Mental Status: She is alert.      Motor: No weakness (Tested bilateral lower extremities in flexion, extension, dorsiflexion, plantarflexion.  5/5.  Upper extremities were avoided since she was holding her child.).      Deep Tendon Reflexes: Reflexes normal (2/4 in bilateral lower extremities for L4 and S1, upper extremities omitted due to patient holding child.).              [1]   Current Outpatient Medications on File Prior to Visit   Medication Sig Dispense Refill    aspirin-acetaminophen-caffeine (EXCEDRIN MIGRAINE) 250-250-65 MG per tablet Take 1 tablet by mouth every 6 (six) hours as needed for headaches      ibuprofen (MOTRIN) 400 mg tablet Take 400 mg by mouth every 6 (six) hours as needed for mild pain      omega-3-acid ethyl esters (LOVAZA) 1 g capsule Take 2 g by mouth in the morning.      Prenatal MV-Min-Fe Fum-FA-DHA (PRENATAL+DHA PO) Take by mouth      [DISCONTINUED] Cobalamin Combinations (B-12) 100-5000 MCG SUBL Take 100 mcg by mouth in the morning (Patient not taking: Reported on 2/10/2025)       Current Facility-Administered Medications on File Prior to Visit   Medication Dose Route Frequency Provider Last Rate Last Admin    Botulinum Toxin Type A SOLR 200 Units  200 Units Intramuscular Q3 Months    200 Units at 05/23/25 0945   [2]   Social History  Tobacco Use    Smoking status: Never    Smokeless tobacco: Never   Vaping Use    Vaping status: Never Used   Substance and Sexual Activity    Alcohol use: Not Currently     Comment: once a month 2-3 beers/drinks    Drug use: No    Sexual activity: Yes     Partners: Male     Birth control/protection: None

## 2025-07-16 NOTE — ASSESSMENT & PLAN NOTE
Patient has known history of chronic migraines.  She currently follows with neurology and obtain Botox every 3 months.  She utilizes ibuprofen and Excedrin as needed for breakthrough pain.  Patient to continue to follow-up with neurology and inform me or specialist if she has an exacerbation of symptoms.

## 2025-08-01 ENCOUNTER — OFFICE VISIT (OUTPATIENT)
Dept: DERMATOLOGY | Facility: CLINIC | Age: 30
End: 2025-08-01
Payer: COMMERCIAL

## 2025-08-01 VITALS — WEIGHT: 128 LBS | TEMPERATURE: 97.6 F | HEIGHT: 65 IN | BODY MASS INDEX: 21.33 KG/M2

## 2025-08-01 DIAGNOSIS — D22.9 MULTIPLE MELANOCYTIC NEVI: Primary | ICD-10-CM

## 2025-08-01 DIAGNOSIS — L82.1 SEBORRHEIC KERATOSIS: ICD-10-CM

## 2025-08-01 DIAGNOSIS — D48.9 NEOPLASM OF UNCERTAIN BEHAVIOR: ICD-10-CM

## 2025-08-01 PROCEDURE — 99203 OFFICE O/P NEW LOW 30 MIN: CPT | Performed by: NURSE PRACTITIONER

## 2025-08-04 ENCOUNTER — APPOINTMENT (OUTPATIENT)
Dept: LAB | Facility: CLINIC | Age: 30
End: 2025-08-04
Payer: COMMERCIAL

## 2025-08-04 DIAGNOSIS — Z31.9 DESIRE FOR PREGNANCY: ICD-10-CM

## 2025-08-04 DIAGNOSIS — Z31.49 PROCREATIVE INVESTIGATION AND TESTING: ICD-10-CM

## 2025-08-04 DIAGNOSIS — N91.2 AMENORRHEA: Primary | ICD-10-CM

## 2025-08-04 DIAGNOSIS — N91.2 AMENORRHEA: ICD-10-CM

## 2025-08-04 LAB
B-HCG SERPL-ACNC: <0.6 MIU/ML (ref 0–5)
FSH SERPL-ACNC: 8 MIU/ML
PROLACTIN SERPL-MCNC: 5.42 NG/ML (ref 3.34–26.72)
TSH SERPL DL<=0.05 MIU/L-ACNC: 0.99 UIU/ML (ref 0.45–4.5)

## 2025-08-04 PROCEDURE — 36415 COLL VENOUS BLD VENIPUNCTURE: CPT

## 2025-08-04 PROCEDURE — 84443 ASSAY THYROID STIM HORMONE: CPT

## 2025-08-04 PROCEDURE — 82166 ASSAY ANTI-MULLERIAN HORM: CPT

## 2025-08-04 PROCEDURE — 84702 CHORIONIC GONADOTROPIN TEST: CPT

## 2025-08-04 PROCEDURE — 84146 ASSAY OF PROLACTIN: CPT

## 2025-08-04 PROCEDURE — 83001 ASSAY OF GONADOTROPIN (FSH): CPT

## 2025-08-07 LAB — MIS SERPL-MCNC: 3.14 NG/ML
